# Patient Record
Sex: MALE | Race: WHITE | NOT HISPANIC OR LATINO | Employment: OTHER | URBAN - METROPOLITAN AREA
[De-identification: names, ages, dates, MRNs, and addresses within clinical notes are randomized per-mention and may not be internally consistent; named-entity substitution may affect disease eponyms.]

---

## 2017-02-22 ENCOUNTER — OFFICE VISIT (OUTPATIENT)
Dept: FAMILY MEDICINE | Facility: CLINIC | Age: 62
End: 2017-02-22
Payer: COMMERCIAL

## 2017-02-22 ENCOUNTER — TELEPHONE (OUTPATIENT)
Dept: FAMILY MEDICINE | Facility: CLINIC | Age: 62
End: 2017-02-22

## 2017-02-22 VITALS
WEIGHT: 158 LBS | BODY MASS INDEX: 25.39 KG/M2 | HEIGHT: 66 IN | SYSTOLIC BLOOD PRESSURE: 116 MMHG | HEART RATE: 84 BPM | TEMPERATURE: 98.1 F | DIASTOLIC BLOOD PRESSURE: 82 MMHG | OXYGEN SATURATION: 97 %

## 2017-02-22 DIAGNOSIS — I10 HYPERTENSION GOAL BP (BLOOD PRESSURE) < 130/80: ICD-10-CM

## 2017-02-22 DIAGNOSIS — E78.5 HYPERLIPIDEMIA LDL GOAL <70: ICD-10-CM

## 2017-02-22 DIAGNOSIS — R19.7 DIARRHEA, UNSPECIFIED TYPE: ICD-10-CM

## 2017-02-22 DIAGNOSIS — R50.9 FEVER, UNSPECIFIED FEVER CAUSE: ICD-10-CM

## 2017-02-22 DIAGNOSIS — J06.9 ACUTE URI: Primary | ICD-10-CM

## 2017-02-22 DIAGNOSIS — I25.10 CORONARY ARTERY DISEASE INVOLVING NATIVE CORONARY ARTERY OF NATIVE HEART WITHOUT ANGINA PECTORIS: ICD-10-CM

## 2017-02-22 LAB
FLUAV+FLUBV AG SPEC QL: ABNORMAL
FLUAV+FLUBV AG SPEC QL: POSITIVE
SPECIMEN SOURCE: ABNORMAL

## 2017-02-22 PROCEDURE — 99213 OFFICE O/P EST LOW 20 MIN: CPT | Performed by: FAMILY MEDICINE

## 2017-02-22 PROCEDURE — 87804 INFLUENZA ASSAY W/OPTIC: CPT | Performed by: FAMILY MEDICINE

## 2017-02-22 RX ORDER — AZITHROMYCIN 250 MG/1
TABLET, FILM COATED ORAL
Qty: 6 TABLET | Refills: 0 | Status: SHIPPED | OUTPATIENT
Start: 2017-02-22 | End: 2017-11-09

## 2017-02-22 NOTE — NURSING NOTE
"Chief Complaint   Patient presents with     Cough       Initial /82  Pulse 84  Temp 98.1  F (36.7  C) (Oral)  Ht 5' 6\" (1.676 m)  Wt 158 lb (71.7 kg)  SpO2 97%  BMI 25.5 kg/m2 Estimated body mass index is 25.5 kg/(m^2) as calculated from the following:    Height as of this encounter: 5' 6\" (1.676 m).    Weight as of this encounter: 158 lb (71.7 kg)..  BP completed using cuff size: padmini Iisdro MA  "

## 2017-02-22 NOTE — PROGRESS NOTES
SUBJECTIVE:                                                    Luis Miguel Barnett is a 61 year old male who presents to clinic today for the following health issues:    Luis Miguel presents to the clinic for a cough lasting 5 days (2/18/17).   He is positive for fever, chills, sweats, headache, sinus pressure, bilateral ear pain, rhinorrhea, congestion (runny), sore throat from coughing, stiff neck/back, cough with productive brown/yellow/green sputum, wheeze, decreased appetite, diarrhea (1.5 days ago), fatigue, achiness.   He is negative for conjunctivitis, nausea, vomiting, chest pain, swelling, dysuria, sickness, strep exposure.   He has tried nyquil and robitussin without relief.  Of note stress test was negative in 11/2016.    Acute Illness   Acute illness concerns: cough  Onset: 5 days    Fever: YES    Chills/Sweats: YES    Headache (location?): YES    Sinus Pressure:YES    Conjunctivitis:  no    Ear Pain: YES: bilateral    Rhinorrhea: YES    Congestion: YES    Sore Throat: YES- from coughing     Cough: YES-productive of green/yellow/brown sputum    Wheeze: YES    Decreased Appetite: YES    Nausea: no    Vomiting: no    Diarrhea:  YES    Dysuria/Freq.: no    Fatigue/Achiness: YES    Sick/Strep Exposure: no     Therapies Tried and outcome: nyquil and robitussin without relief       Problem list and histories reviewed & adjusted, as indicated.  Additional history: as documented    BP Readings from Last 3 Encounters:   02/22/17 116/82   11/17/16 112/84   06/13/16 106/66     Recent Labs   Lab Test  11/10/16   0924  10/30/14   0952  07/15/13   0920   CHOL  142  154  161   HDL  40  39*  39*   LDL  69  72  82   TRIG  167*  215*  198*   CHOLHDLRATIO   --   3.9  4.1     Wt Readings from Last 4 Encounters:   02/22/17 158 lb (71.7 kg)   11/17/16 159 lb (72.1 kg)   06/13/16 157 lb (71.2 kg)   04/08/16 156 lb (70.8 kg)       Health Maintenance    Health Maintenance Due   Topic Date Due     WELLNESS VISIT Q1 YR (NO INBASKET)   1955     HEPATITIS C SCREENING  08/07/1974     PSA Q1 YR  05/24/2012     MICROALBUMIN Q1 YEAR( NO INBASKET)  04/23/2016     INFLUENZA VACCINE (SYSTEM ASSIGNED)  09/01/2016       Current Problem List    Patient Active Problem List   Diagnosis     Presbyopia     Hyperlipidemia LDL goal <70     Hypertension goal BP (blood pressure) < 130/80     Advanced directives, counseling/discussion     Coronary artery disease involving native coronary artery without angina pectoris       Past Medical History    Past Medical History   Diagnosis Date     Antiplatelet or antithrombotic long-term use      Coronary artery disease 2007     multivessel stenting - JULIETA mid LAD x2 and JULIETA 1st diagonal, 2008 complete obstruction 1st diagonal with successful thrombectomy and JULIETA, 2013 in stent restenosis mid LAD (25%), prox to LAD stent had in segment restenosis of 50%, 50-70% in stent restenosis diagonal     Hypertension      Presbyopia      Pure hypercholesterolemia      Stented coronary artery      Unspecified cardiovascular disease 6/07, 11/08       Past Surgical History    Past Surgical History   Procedure Laterality Date     Surgical history of -   1994     s/p vasectomy      Surgical history of -   6/07, 11/08, 2/10     stent x 2 - LAD/Diagonal - Dr Gastelum - stent 11/08 - mid LAD     Surgical history of -   10/10     stress echo normal     Herniorrhaphy inguinal  11/16/2012     Procedure: HERNIORRHAPHY INGUINAL;  left inguinal hernia repair with Mesh ;  Surgeon: Cam Lancaster MD;  Location: [facility] OR     Heart cath, angioplasty       multivessel stenting - JULIETA mid LAD x2 and JULIETA 1st diagonal, 2008 complete obstruction 1st diagonal with successful thrombectomy and JULIETA, 2013 in stent restenosis mid LAD (25%), prox to LAD stent had in segment restenosis of 50%, 50-70% in stent restenosis diagonal       Current Medications    Current Outpatient Prescriptions   Medication Sig Dispense Refill     azithromycin (ZITHROMAX) 250 MG tablet 2  tabs day 1 and the 1 tab daily for 4 more days 6 tablet 0     nitroglycerin (NITROSTAT) 0.4 MG SL tablet Place 1 tablet (0.4 mg) under the tongue every 5 minutes as needed for chest pain 25 tablet 3     ezetimibe-simvastatin (VYTORIN) 10-40 MG per tablet Take 1 tablet by mouth At Bedtime 90 tablet 3     metoprolol (TOPROL-XL) 25 MG 24 hr tablet Take 1 tablet (25 mg) by mouth daily 90 tablet 4     lisinopril (PRINIVIL,ZESTRIL) 5 MG tablet Take 1 tablet (5 mg) by mouth daily 90 tablet 4     clopidogrel (PLAVIX) 75 MG tablet ONE DAILY 90 tablet 4     ascorbic acid (VITAMIN C) 500 MG tablet Take by mouth daily       Multiple Vitamin (MULTIVITAMINS PO) Take 1 tablet by mouth daily       ASPIRIN 81 MG OR TABS ONE DAILY 100 3       Allergies    No Known Allergies    Immunizations    Immunization History   Administered Date(s) Administered     TD (ADULT, 7+) 01/15/1998     TDAP (ADACEL AGES 11-64) 01/14/2008       Family History    Family History   Problem Relation Age of Onset     Alzheimer Disease Mother      HEART DISEASE Father      MI     C.A.D. Brother 48     Stents     Family History Negative Other        Social History    Social History     Social History     Marital status:      Spouse name: N/A     Number of children: N/A     Years of education: N/A     Occupational History     Not on file.     Social History Main Topics     Smoking status: Former Smoker     Packs/day: 1.00     Years: 30.00     Quit date: 4/1/2001     Smokeless tobacco: Never Used      Comment: quit 4/2001     Alcohol use No     Drug use: No     Sexual activity: Yes     Other Topics Concern     Parent/Sibling W/ Cabg, Mi Or Angioplasty Before 65f 55m? Yes     Caffeine Concern Yes     1 soda a day     Sleep Concern No     does not sleep well some nights     Stress Concern No     Special Diet No     Exercise Yes     Walking 2 days per week, working PT     Seat Belt Yes     Social History Narrative       All above reviewed and updated, all  "stable unless otherwise noted    Recent labs reviewed    ROS:  Constitutional, HEENT, cardiovascular, pulmonary, GI, , musculoskeletal, neuro, skin, endocrine and psych systems are negative, except as in HPI or otherwise noted       OBJECTIVE:                                                    /82  Pulse 84  Temp 98.1  F (36.7  C) (Oral)  Ht 5' 6\" (1.676 m)  Wt 158 lb (71.7 kg)  SpO2 97%  BMI 25.5 kg/m2  Body mass index is 25.5 kg/(m^2).  GENERAL: healthy, alert and no distress  EYES: Eyes grossly normal to inspection, extraocular movements - intact, and PERRL  HENT: ear canals- normal; TMs- normal; Nose- normal; Mouth- no ulcers, no lesions  NECK: no tenderness, no adenopathy, no asymmetry, no masses, no stiffness; thyroid- normal to palpation  RESP: lungs clear to auscultation - no rales, no rhonchi, no wheezes  CV: regular rates and rhythm, normal S1 S2, no S3 or S4 and no murmur, no click or rub -  ABDOMEN: soft, no tenderness, no  hepatosplenomegaly, no masses, normal bowel sounds  MS: extremities- no gross deformities noted, no edema  SKIN: no suspicious lesions, no rashes  NEURO: strength and tone- normal, sensory exam- grossly normal, mentation- intact, speech- normal, reflexes- symmetric  BACK: no CVA tenderness, no paralumbar tenderness  PSYCH: Alert and oriented times 3; speech- coherent , normal rate and volume; able to articulate logical thoughts, able to abstract reason, no tangential thoughts, no hallucinations or delusions, affect- normal    DIAGNOSTICS/PROCEDURES:                                                      none     ASSESSMENT/PLAN:                                                        ICD-10-CM    1. Acute URI J06.9 azithromycin (ZITHROMAX) 250 MG tablet   2. Fever, unspecified fever cause R50.9 Influenza A/B antigen     azithromycin (ZITHROMAX) 250 MG tablet   3. Diarrhea, unspecified type R19.7    4. Coronary artery disease involving native coronary artery of native heart " without angina pectoris I25.10    5. Hypertension goal BP (blood pressure) < 130/80 I10    6. Hyperlipidemia LDL goal <70 E78.5        Discussed treatment/modality options, including risk and benefits, he desires advised alcohol consumption 1oz per day or less, advised aspirin 81 mg po daily, advised 1 multivitamin per day, advised calcium 3694-8767 mg/d and Vitamin D 800-1200 IU/d, advised dentist every 6 months, advised diet and exercise, advised opthalmologist every 1-2 years, further health care maintenance, new medication(s), OTC meds and observation. All diagnosis above reviewed and noted above, otherwise stable.  See [facility] orders for further details.  Follow up as needed.    OTC meds advised, prescribed Zithromax, further HCM prn, sx cares.    Health Maintenance Due   Topic Date Due     WELLNESS VISIT Q1 YR (NO INBASKET)  1955     HEPATITIS C SCREENING  08/07/1974     PSA Q1 YR  05/24/2012     MICROALBUMIN Q1 YEAR( NO INBASKET)  04/23/2016     INFLUENZA VACCINE (SYSTEM ASSIGNED)  09/01/2016       See Patient Instructions  This document serves as a record of the services and decisions personally performed and made by Adonis Swenson MD [facility]. It was created on their behalf by Nicolás Zaidi, a trained medical scribe. The creation of this document is based the provider's statements to the medical scribe.  Nicolás Zaidi February 22, 2017 9:22 AM             Adonis Swenson MD [address]  [id number] [phone] Fax

## 2017-02-22 NOTE — PATIENT INSTRUCTIONS
2/22/17    Zithromax- 2 pills today 1 daily next 4 days    Use OTC Mucinex DM and warm showers to help with sinus and nasal congestion.    Tylenol and/or ibuprofen as needed     [facility] [place]                        To reach your care team during and after hours:   [phone]  To reach our pharmacy:        [phone]    Clinic Hours                        Our clinic hours are:    Monday   7:30 am to 7:00 pm                  Tuesday through Friday 7:30 am to 5:00 pm                             Saturday   8:00 am to 12:00 pm      Sunday   Closed      Pharmacy Hours                        Our pharmacy hours are:    Monday   8:30 am to 7:00 pm       Tuesday to Friday  8:30 am to 6:00 pm                       Saturday    9:00 am to 1:00 pm              Sunday    Closed              There is also information available at our web site:  [email or web address]    If your provider ordered any lab tests and you do not receive the results within 10 business days, please call the clinic.    If you need a medication refill please contact your pharmacy.  Please allow 2-3 business days for your refill to be completed.    Our clinic offers telephone visits and e visits.  Please ask one of your team members to explain more.      Use [employer] (secure email communication and access to your chart) to send your primary care provider a message or make an appointment. Ask someone on your Team how to sign up for [employer].  Immunizations                      Immunization History   Administered Date(s) Administered     TD (ADULT, 7+) 01/15/1998     TDAP (ADACEL AGES 11-64) 01/14/2008        Health Maintenance                         Health Maintenance Due   Topic Date Due     Wellness Visit with your Primary Provider - yearly  1955     Hepatitis C Screening  08/07/1974     Prostate Test (PSA) - yearly  05/24/2012     Microalbumin Lab - yearly  04/23/2016     Flu Vaccine - yearly  09/01/2016

## 2017-02-22 NOTE — MR AVS SNAPSHOT
After Visit Summary   2/22/2017    Luis Miguel Barnett    MRN: [id number]           Patient Information     Date Of Birth          1955        Visit Information        Provider Department      2/22/2017 9:00 AM Adonis Swenson MD [facility]        Today's Diagnoses     Acute URI    -  1    Fever, unspecified fever cause        Diarrhea, unspecified type        Coronary artery disease involving native coronary artery of native heart without angina pectoris        Hypertension goal BP (blood pressure) < 130/80        Hyperlipidemia LDL goal <70          Care Instructions    2/22/17    Zithromax- 2 pills today 1 daily next 4 days    Use OTC Mucinex DM and warm showers to help with sinus and nasal congestion.    Tylenol and/or ibuprofen as needed     [facility] [place]                        To reach your care team during and after hours:   [phone]  To reach our pharmacy:        [phone]    Clinic Hours                        Our clinic hours are:    Monday   7:30 am to 7:00 pm                  Tuesday through Friday 7:30 am to 5:00 pm                             Saturday   8:00 am to 12:00 pm      Sunday   Closed      Pharmacy Hours                        Our pharmacy hours are:    Monday   8:30 am to 7:00 pm       Tuesday to Friday  8:30 am to 6:00 pm                       Saturday    9:00 am to 1:00 pm              Sunday    Closed              There is also information available at our web site:  [email or web address]    If your provider ordered any lab tests and you do not receive the results within 10 business days, please call the clinic.    If you need a medication refill please contact your pharmacy.  Please allow 2-3 business days for your refill to be completed.    Our clinic offers telephone visits and e visits.  Please ask one of your team members to explain more.      Use [employer] (secure email communication and access to your chart) to send your primary care  "provider a message or make an appointment. Ask someone on your Team how to sign up for [employer].  Immunizations                      Immunization History   Administered Date(s) Administered     TD (ADULT, 7+) 01/15/1998     TDAP (ADACEL AGES 11-64) 01/14/2008        Health Maintenance                         Health Maintenance Due   Topic Date Due     Wellness Visit with your Primary Provider - yearly  1955     Hepatitis C Screening  08/07/1974     Prostate Test (PSA) - yearly  05/24/2012     Microalbumin Lab - yearly  04/23/2016     Flu Vaccine - yearly  09/01/2016             Follow-ups after your visit        Who to contact     If you have questions or need follow up information about today's clinic visit or your schedule please contact [facility] directly at [phone].  Normal or non-critical lab and imaging results will be communicated to you by MyChart, letter or phone within 4 business days after the clinic has received the results. If you do not hear from us within 7 days, please contact the clinic through MyChart or phone. If you have a critical or abnormal lab result, we will notify you by phone as soon as possible.  Submit refill requests through [employer] or call your pharmacy and they will forward the refill request to us. Please allow 3 business days for your refill to be completed.          Additional Information About Your Visit        [employer]Hubs Information     [employer] lets you send messages to your doctor, view your test results, renew your prescriptions, schedule appointments and more. To sign up, go to [email or web address] . Click on \"Log in\" on the left side of the screen, which will take you to the Welcome page. Then click on \"Sign up Now\" on the right side of the page.     You will be asked to enter the access code listed below, as well as some personal information. Please follow the directions to create your username and password.     Your access code is: " [email or web address]  Expires: 2017  9:43 AM     Your access code will  in 90 days. If you need help or a new code, please call your [facility] clinic or [phone].        Care EveryWhere ID     This is your Care EveryWhere ID. This could be used by other organizations to access your [facility] medical records  [id number]        Your Vitals Were     Pulse Temperature Height Pulse Oximetry BMI (Body Mass Index)       84 98.1  F (36.7  C) (Oral) 5' 6\" (1.676 m) 97% 25.5 kg/m2        Blood Pressure from Last 3 Encounters:   17 116/82   16 112/84   16 106/66    Weight from Last 3 Encounters:   17 158 lb (71.7 kg)   16 159 lb (72.1 kg)   16 157 lb (71.2 kg)              We Performed the Following     Influenza A/B antigen          Today's Medication Changes          These changes are accurate as of: 17  9:43 AM.  If you have any questions, ask your nurse or doctor.               Start taking these medicines.        Dose/Directions    azithromycin 250 MG tablet   Commonly known as:  ZITHROMAX   Used for:  Acute URI, Fever, unspecified fever cause   Started by:  Adonis Swenson MD        2 tabs day 1 and the 1 tab daily for 4 more days   Quantity:  6 tablet   Refills:  0            Where to get your medicines      These medications were sent to [address] [address]     Phone:  [phone]     azithromycin 250 MG tablet                Primary Care Provider Office Phone # Fax #    Tommie King [phone]       [address] [address]        Thank you!     Thank you for choosing [facility]  for your care. Our goal is always to provide you with excellent care. Hearing back from our patients is one way we can continue to improve our services. Please take a few minutes to complete the written survey that " you may receive in the mail after your visit with us. Thank you!             Your Updated Medication List - Protect others around you: Learn how to safely use, store and throw away your medicines at www.disposemymeds.org.          This list is accurate as of: 2/22/17  9:43 AM.  Always use your most recent med list.                   Brand Name Dispense Instructions for use    ascorbic acid 500 MG tablet    VITAMIN C     Take by mouth daily       aspirin 81 MG tablet     100    ONE DAILY       azithromycin 250 MG tablet    ZITHROMAX    6 tablet    2 tabs day 1 and the 1 tab daily for 4 more days       clopidogrel 75 MG tablet    PLAVIX    90 tablet    ONE DAILY       ezetimibe-simvastatin 10-40 MG per tablet    VYTORIN    90 tablet    Take 1 tablet by mouth At Bedtime       lisinopril 5 MG tablet    PRINIVIL/ZESTRIL    90 tablet    Take 1 tablet (5 mg) by mouth daily       metoprolol 25 MG 24 hr tablet    TOPROL-XL    90 tablet    Take 1 tablet (25 mg) by mouth daily       MULTIVITAMINS PO      Take 1 tablet by mouth daily       nitroglycerin 0.4 MG sublingual tablet    NITROSTAT    25 tablet    Place 1 tablet (0.4 mg) under the tongue every 5 minutes as needed for chest pain

## 2017-02-22 NOTE — TELEPHONE ENCOUNTER
Message handled by Nurse Triage with [employer] - provider name: OMER DEL ROSARIO     .  Called pt advised he has influenza A.  Advised pper OMER DEL ROSARIO that if his wife wanted Tamiflu she could get preventative.    Mindy Grace RN    [facility]

## 2017-03-24 DIAGNOSIS — H92.01 OTALGIA, RIGHT: ICD-10-CM

## 2017-03-24 DIAGNOSIS — H65.90 OME (OTITIS MEDIA WITH EFFUSION), UNSPECIFIED LATERALITY: Primary | ICD-10-CM

## 2017-03-24 RX ORDER — NEOMYCIN SULFATE, POLYMYXIN B SULFATE AND HYDROCORTISONE 10; 3.5; 1 MG/ML; MG/ML; [USP'U]/ML
SUSPENSION/ DROPS AURICULAR (OTIC)
Qty: 10 ML | Refills: 0 | Status: SHIPPED | OUTPATIENT
Start: 2017-03-24 | End: 2018-03-30

## 2017-03-24 NOTE — TELEPHONE ENCOUNTER
This is not historical.  Just was not on current med list.  Routing refill request to provider for review/approval because:  Drug not active on patient's medication list    Routing to PCP for further review/recommendations/orders.    Summer Padilla RN

## 2017-03-24 NOTE — TELEPHONE ENCOUNTER
SVETA/POLY/HC OTIC SUSPENSION    - historical  Last Written Prescription Date: 06/29/2015  Last Fill Quantity: 10mL,  # refills: 0   Last Office Visit with [facility], [facility] or [facility] prescribing provider: 02/22/2017

## 2017-03-31 ENCOUNTER — TELEPHONE (OUTPATIENT)
Dept: FAMILY MEDICINE | Facility: CLINIC | Age: 62
End: 2017-03-31

## 2017-03-31 ENCOUNTER — VIRTUAL VISIT (OUTPATIENT)
Dept: FAMILY MEDICINE | Facility: OTHER | Age: 62
End: 2017-03-31

## 2017-03-31 NOTE — TELEPHONE ENCOUNTER
Acute Illness   Acute illness concerns: cough and congestion   Onset:2/28/2017    Fever: no     Chills/Sweats: YES- chilled     Headache (location?): no     Sinus Pressure:YES- post-nasal drainage    Conjunctivitis:  no    Ear Pain: no    Rhinorrhea: YES- clear     Congestion: YES    Sore Throat: YES     Cough: YES-non-productive    Wheeze: no     Decreased Appetite: YES    Nausea: no     Vomiting: no     Diarrhea:  no     Dysuria/Freq.: no     Fatigue/Achiness: YES- body aches     Sick/Strep Exposure: no     Therapies Tried and outcome: tylenol       Advised pt he needs to try tylenol mortrin and sudafed, push fluids humidify rooms while sleeping and if not better in 7 days then call the clinic back     Patient stated an understanding and agreed with plan.    Pippa Sethi RN, BSN  [facility]

## 2017-03-31 NOTE — TELEPHONE ENCOUNTER
Reason for Call:  Patient had influenza a.  Was given zithromax.  States he is leaving for [place] tomorrow.  States he feels like he is getting sick again and thinks he would like to get amoxicillin.  Please advise    Best phone number to reach pt at is: [phone]  Ok to leave a message with medical info? yes    Pharmacy preferred (if calling for a refill): [facility]

## 2017-04-01 NOTE — PROGRESS NOTES
"Date:   Clinician: Thomas Peralta  Clinician NPI: [id number]  Patient: Luis Miguel Barnett  Patient : 1955  Patient Address: [address] [place] [address], [place] [address]  Patient Phone: [phone]  Visit Protocol: URI  Patient Summary:  Luis Miguel is a 61 year old ( : 1955 ) male who initiated a Zip for significant cough. When asked the question \"Do you have a [place] primary care physician?\", Luis Miguel responded \"I don't know\".     His symptoms started gradually 3-6 days ago and consist of myalgias, malaise, loss of appetite, cough, itchy eyes, chills, dysphagia, rhinitis, post-nasal drainage, nasal congestion, sore throat, and hoarse voice.   He denies ear pain, chest pain, fever, nausea, vomiting, dyspnea, and petechial or purpuric rash. He denies a history of facial surgery.   His moderate nasal secretions are yellow. His moderate facial pain or pressure feels worse when bending over or leaning forward and is located on both sides of his head. The facial pain or pressure started after the onset of other URI symptoms.  Luis Miguel has a mild headache. The headache did not start before his other symptoms and is located on both sides of his head.   He has a moderately painful sore throat. When Luis Miguel swallows liquids or saliva, he experiences moderate pain. The patient denies having white spots on the tonsils similar to a sample strep throat image provided. He might have been exposed to Strep. When asked to feel his neck he could not tell if lymph nodes were enlarged. He denies axillary lymphadenopathy.   His moderately severe (cough every 5-10 minutes) productive cough is more bothersome at night. He believes the cough is caused by post-nasal drainage. His cough produces yellow sputum. He experiences moderate wheezing on a regular basis.   He denies rigors.   Luis Miguel denies having COPD or other chronic lung disease.   Pulse: Not measured beats in 10 seconds.    Weight (in lbs): 160   Luis Miguel does not " smoke or use smokeless tobacco.    MEDICATIONS:  No current medications , ALLERGIES:  NKDA   Clinician Response:  Dear Luis Miguel,  Based on the information you have provided, you likely have a viral upper respiratory infection, otherwise known as a 'cold'.   I am prescribing benzonatate (Tessalon Perles) 100 mg to treat your cough. Take one to two tablets by mouth three times a day as needed for cough. There are no refills with this prescription.   Unless your are allergic to the over-the-counter medication(s) below, I recommend using:   Guaifenesin + dextromethorphan (Robitussin DM, Mucinex DM). This is an over-the-counter medication that does not require a prescription.   A decongestant such as pseudoephedrine (Sudafed or store brand) to help your symptoms. This is an over-the-counter medication that does not require a prescription.   Ibuprofen. Take 1-3 200mg tablets (200-600mg) every 8 hours to help with the discomfort. Make sure to take the ibuprofen with food. Do not exceed 2400mg in 24 hours. This is an over-the-counter medication that does not require a prescription.   Try the following to help with your throat pain and discomfort:     Use throat lozenges    Gargle with warm salt water (1/4 teaspoon of salt per 8 ounce glass of water).    Suck on frozen items such as Popsicles or ice cubes.     Call 911 or go to the emergency room if you feel that your throat is closing off, you suddenly develop a rash, you are unable to swallow fluids, you are drooling, or you are having difficulty breathing.  Follow up with your primary care clinician if your symptoms are not improving in 3-4 days.   Because your condition is most likely caused by a virus, antibiotics will not help you get better. Inappropriately treating a viral infection with antibiotics may cause harmful side-effects. In fact, antibiotics may make you feel worse.  For more information on why I am not prescribing antibiotics, please watch this video:  Antibiotics Won't Help You.   Drink plenty of liquids, especially water and take time to rest your body. This may mean taking a nap or going to bed earlier. Your body is fighting an infection and liquids and rest will improve the pace of recovery. Remember to regularly wash your hands and avoid close contact with others to prevent spreading your infection.   Diagnosis: Viral URI  Diagnosis ICD: J06.9  Prescription: benzonatate (Tessalon Perles) 100mg oral tablet 30 tablets, 5 days supply. Take one to two tablets by mouth three times a day as needed. disp. 30. Refills: 0, Refill as needed: no, Allow substitutions: yes

## 2017-06-16 ENCOUNTER — TELEPHONE (OUTPATIENT)
Dept: CARDIOLOGY | Facility: CLINIC | Age: 62
End: 2017-06-16

## 2017-06-16 DIAGNOSIS — I25.10 CAD (CORONARY ARTERY DISEASE): Primary | ICD-10-CM

## 2017-06-16 NOTE — TELEPHONE ENCOUNTER
Pt called to report that he has been experiencing intermittent episodes of a discomfort in his chest that he describes as hollow and achy feeling in the center of his chest, for past 2-3 days. The episodes occur both with exertion and activity. He did try taking an SL nitro with an episode yesterday, but had a hard time telling whether it helped because the nitro makes him feel poorly. He said he also been yawning a lot more frequently and having to take deeper breaths, but doesn't describe any prolonged episodes of shortness of breath. Pt typically gets an annual stress echo and will be due 11/2017. He has hx of CAD and has had multiple revascularizations of his LAD at the bifurcation. Pt aware to go to ER if he develops any prolonged or worsening episodes. I tentatively scheduled pt for stress echo next Friday (pt wants Thu or Friday d/t work obligations). Will send an update to  and verify whether she wants pt to have stress echo at this time. Scheduled pt to see  6/29 @3093. Peri FERNANDEZ

## 2017-06-21 NOTE — TELEPHONE ENCOUNTER
I called pt to find out how he has been feeling the past several days. He said he has been feeling fine since I last spoke to him. I reviewed with  and she said pt should have stress echo prior to 6/30 OV as it has been very good for detection of restenosis for him. I called and reviewed with pt. He would like to have stress echo same day as OV with  as he already has to take off work that day. I transferred him to scheduling to set up. Peri FERNANDEZ

## 2017-06-30 ENCOUNTER — HOSPITAL ENCOUNTER (OUTPATIENT)
Dept: CARDIOLOGY | Facility: CLINIC | Age: 62
Discharge: HOME | End: 2017-06-30
Attending: INTERNAL MEDICINE | Admitting: INTERNAL MEDICINE
Payer: COMMERCIAL

## 2017-06-30 ENCOUNTER — TELEPHONE (OUTPATIENT)
Dept: CARDIOLOGY | Facility: CLINIC | Age: 62
End: 2017-06-30

## 2017-06-30 DIAGNOSIS — I25.10 CAD (CORONARY ARTERY DISEASE): ICD-10-CM

## 2017-06-30 DIAGNOSIS — I25.10 CAD (CORONARY ARTERY DISEASE): Primary | ICD-10-CM

## 2017-06-30 PROCEDURE — 25500064 ZZH RX 255 OP 636: Performed by: INTERNAL MEDICINE

## 2017-06-30 PROCEDURE — 93325 DOPPLER ECHO COLOR FLOW MAPG: CPT | Mod: 26 | Performed by: INTERNAL MEDICINE

## 2017-06-30 PROCEDURE — 93016 CV STRESS TEST SUPVJ ONLY: CPT | Performed by: INTERNAL MEDICINE

## 2017-06-30 PROCEDURE — 93018 CV STRESS TEST I&R ONLY: CPT | Performed by: INTERNAL MEDICINE

## 2017-06-30 PROCEDURE — 93350 STRESS TTE ONLY: CPT | Mod: 26 | Performed by: INTERNAL MEDICINE

## 2017-06-30 PROCEDURE — 93321 DOPPLER ECHO F-UP/LMTD STD: CPT | Mod: 26 | Performed by: INTERNAL MEDICINE

## 2017-06-30 PROCEDURE — 40000264 ECHO STRESS TEST WITH LUMASON

## 2017-06-30 RX ADMIN — SULFUR HEXAFLUORIDE 5 ML: KIT at 09:30

## 2017-06-30 NOTE — TELEPHONE ENCOUNTER
Stress echo results from 6/30 noted, and no evidence of ischemia. Pt has OV with  this afternoon. I called him with results and pt said he has been feeling fine since we spoke earlier this month regarding an episode of chest pain he had recently. Pt does not want to come to [place] if not needed. I told him since he is not having any symptoms and stress echo was normal I will cancel his OV with  today, and have him f/u in 11/2017 for his annual with . Pt will call sooner if he has any further episodes of chest pain. Peri FERNANDEZ

## 2017-11-08 ENCOUNTER — PRE VISIT (OUTPATIENT)
Dept: CARDIOLOGY | Facility: CLINIC | Age: 62
End: 2017-11-08

## 2017-11-09 ENCOUNTER — OFFICE VISIT (OUTPATIENT)
Dept: CARDIOLOGY | Facility: CLINIC | Age: 62
End: 2017-11-09
Attending: INTERNAL MEDICINE
Payer: COMMERCIAL

## 2017-11-09 VITALS
SYSTOLIC BLOOD PRESSURE: 112 MMHG | DIASTOLIC BLOOD PRESSURE: 66 MMHG | HEIGHT: 66 IN | BODY MASS INDEX: 25.46 KG/M2 | HEART RATE: 78 BPM | WEIGHT: 158.4 LBS

## 2017-11-09 DIAGNOSIS — I10 HYPERTENSION GOAL BP (BLOOD PRESSURE) < 130/80: ICD-10-CM

## 2017-11-09 DIAGNOSIS — I25.10 CORONARY ARTERY DISEASE INVOLVING NATIVE CORONARY ARTERY OF NATIVE HEART WITHOUT ANGINA PECTORIS: Primary | ICD-10-CM

## 2017-11-09 DIAGNOSIS — E78.00 PURE HYPERCHOLESTEROLEMIA: ICD-10-CM

## 2017-11-09 PROCEDURE — 99213 OFFICE O/P EST LOW 20 MIN: CPT | Performed by: INTERNAL MEDICINE

## 2017-11-09 NOTE — LETTER
11/9/2017    Tommie King MD  [id number] [facility] [address]      RE: Luis Miguel Barnett       Dear Colleague,    I had the pleasure of seeing Luis Miguel Barnett in the [facility].    REFERRING PHYSICIAN:  Dr. Tommie King.      HISTORY OF PRESENT ILLNESS:  Mr. Barnett is a pleasant 62-year-old male with a history of coronary artery disease.  He has had multiple revascularizations of his left anterior descending artery at the bifurcation with the diagonal.  He did come in with some chest pain symptoms in June of this year and underwent an early stress echocardiogram for assessment of his symptoms.  He did not have any reproducible symptoms on the treadmill, and his echocardiogram appeared normal without evidence of ischemia.  He was able to walk 11 minutes on a standard Vipin protocol, which was similar to his previous MET level from the year before.  Mr. Barnett says that his symptoms had resolved after the stress testing and he has not had any recurrence of symptoms of chest discomfort.  He is staying active.  He denies any difficulty breathing or palpitations.      PHYSICAL EXAMINATION:   VITAL SIGNS:  On exam today, his blood pressure is 112/66, pulse is 78, weight 158 with a body mass index of 25.   NECK:  Carotid upstrokes are brisk without bruit.   CARDIOVASCULAR:  Tones are regular.  I do not appreciate a murmur, gallop or rub.   LUNGS:  Clear posteriorly.   EXTREMITIES:  He has strong and symmetric pulses in the upper and lower extremities without peripheral edema.      ASSESSMENT AND PLAN:  In summary, Mr. Barnett is a pleasant 62-year-old male with a history of coronary artery disease.  He has had a complicated revascularization of his left anterior descending artery at the bifurcation of the first diagonal and had multiple revascularizations in this area.  He did have some atypical chest pain symptoms here at the beginning of this summer in June.  He underwent an early  stress echocardiogram for evaluation of his symptoms.  This came back normal, and he was able to perform a similar level of exercise as the year before without difficulty or symptoms on the treadmill.  His symptoms resolved after that on their own and have not reoccurred.  At this time, he is stable from a cardiac perspective and asymptomatic.  I will recommend to continue his current medical management.  I will be happy to continue to follow him on an annual basis or as needed.  Please feel free to contact me with any questions you have in regards to his care.          Outpatient Encounter Prescriptions as of 11/9/2017   Medication Sig Dispense Refill     nitroglycerin (NITROSTAT) 0.4 MG SL tablet Place 1 tablet (0.4 mg) under the tongue every 5 minutes as needed for chest pain 25 tablet 3     [DISCONTINUED] ezetimibe-simvastatin (VYTORIN) 10-40 MG per tablet Take 1 tablet by mouth At Bedtime 90 tablet 3     [DISCONTINUED] metoprolol (TOPROL-XL) 25 MG 24 hr tablet Take 1 tablet (25 mg) by mouth daily 90 tablet 4     [DISCONTINUED] lisinopril (PRINIVIL,ZESTRIL) 5 MG tablet Take 1 tablet (5 mg) by mouth daily 90 tablet 4     [DISCONTINUED] clopidogrel (PLAVIX) 75 MG tablet ONE DAILY 90 tablet 4     ascorbic acid (VITAMIN C) 500 MG tablet Take by mouth daily       Multiple Vitamin (MULTIVITAMINS PO) Take 1 tablet by mouth daily       ASPIRIN 81 MG OR TABS ONE DAILY 100 3     neomycin-polymyxin-hydrocortisone (CORTISPORIN) 3.5-05493-1 otic suspension PLACE 4 DROPS INTO THE RIGHT EAR FOUR TIMES A DAY (Patient not taking: Reported on 11/9/2017) 10 mL 0     [DISCONTINUED] azithromycin (ZITHROMAX) 250 MG tablet 2 tabs day 1 and the 1 tab daily for 4 more days 6 tablet 0     No facility-administered encounter medications on file as of 11/9/2017.        Again, thank you for allowing me to participate in the care of your patient.      Sincerely,    Saniya Bustillo, DO     [facility]

## 2017-11-09 NOTE — MR AVS SNAPSHOT
"              After Visit Summary   11/9/2017    Luis Miguel Barnett    MRN: [id number]           Patient Information     Date Of Birth          1955        Visit Information        Provider Department      11/9/2017 2:45 PM Saniya Bustillo DO [facility]        Today's Diagnoses     Coronary artery disease involving native coronary artery of native heart without angina pectoris    -  1    Hypertension goal BP (blood pressure) < 130/80        Pure hypercholesterolemia           Follow-ups after your visit        Additional Services     Follow-Up with Cardiologist                 Future tests that were ordered for you today     Open Future Orders        Priority Expected Expires Ordered    Exercise Stress Echocardiogram Routine 11/9/2018 11/10/2018 11/9/2017    Follow-Up with Cardiologist Routine 11/9/2018 11/10/2018 11/9/2017            Who to contact     If you have questions or need follow up information about today's clinic visit or your schedule please contact [facility] directly at [phone].  Normal or non-critical lab and imaging results will be communicated to you by [employer], letter or phone within 4 business days after the clinic has received the results. If you do not hear from us within 7 days, please contact the clinic through [email or web address] or phone. If you have a critical or abnormal lab result, we will notify you by phone as soon as possible.  Submit refill requests through [employer] or call your pharmacy and they will forward the refill request to us. Please allow 3 business days for your refill to be completed.          Additional Information About Your Visit        MyChart Information     [employer] lets you send messages to your doctor, view your test results, renew your prescriptions, schedule appointments and more. To sign up, go to [email or web address] . Click on \"Log in\" on the left side of the screen, " "which will take you to the Welcome page. Then click on \"Sign up Now\" on the right side of the page.     You will be asked to enter the access code listed below, as well as some personal information. Please follow the directions to create your username and password.     Your access code is: [email or web address]  Expires: 2018  3:10 PM     Your access code will  in 90 days. If you need help or a new code, please call your [facility] clinic or [phone].        Care EveryWhere ID     This is your Care EveryWhere ID. This could be used by other organizations to access your [facility] medical records  [id number]        Your Vitals Were     Pulse Height BMI (Body Mass Index)             78 1.676 m (5' 6\") 25.57 kg/m2          Blood Pressure from Last 3 Encounters:   17 112/66   17 116/82   16 112/84    Weight from Last 3 Encounters:   17 71.8 kg (158 lb 6.4 oz)   17 71.7 kg (158 lb)   16 72.1 kg (159 lb)              We Performed the Following     Follow-Up with Cardiologist        Primary Care Provider Office Phone # Fax #    Tommie King [phone]       [address] [place] [address]        Equal Access to Services     [facility] : Hadii eitan ku hadasho Sonasim, waaxda luqadaha, qaybta kaalmada kingayacaitlin, nina paz . So [facility] [phone].    ATENCIÓN: Si habla español, tiene a luo disposición servicios gratuitos de asistencia lingüística. Sindy al [phone].    We comply with applicable federal civil rights laws and [place] laws. We do not discriminate on the basis of race, color, national origin, age, disability, sex, sexual orientation, or gender identity.            Thank you!     Thank you for choosing [facility]  for your care. Our goal is always to provide you with excellent care. Hearing back from our patients is one way we can continue to improve our services. " Please take a few minutes to complete the written survey that you may receive in the mail after your visit with us. Thank you!             Your Updated Medication List - Protect others around you: Learn how to safely use, store and throw away your medicines at www.disposemymeds.org.          This list is accurate as of: 11/9/17  3:10 PM.  Always use your most recent med list.                   Brand Name Dispense Instructions for use Diagnosis    ascorbic acid 500 MG tablet    VITAMIN C     Take by mouth daily        aspirin 81 MG tablet     100    ONE DAILY    Unspecified cardiovascular disease, Pure hypercholesterolemia       clopidogrel 75 MG tablet    PLAVIX    90 tablet    ONE DAILY    Coronary artery disease involving native coronary artery of native heart without angina pectoris       ezetimibe-simvastatin 10-40 MG per tablet    VYTORIN    90 tablet    Take 1 tablet by mouth At Bedtime    Pure hypercholesterolemia       lisinopril 5 MG tablet    PRINIVIL/ZESTRIL    90 tablet    Take 1 tablet (5 mg) by mouth daily    Benign essential hypertension       metoprolol 25 MG 24 hr tablet    TOPROL-XL    90 tablet    Take 1 tablet (25 mg) by mouth daily    Benign essential hypertension, Coronary artery disease involving native coronary artery of native heart without angina pectoris       MULTIVITAMINS PO      Take 1 tablet by mouth daily        neomycin-polymyxin-hydrocortisone 3.5-94350-6 otic suspension    CORTISPORIN    10 mL    PLACE 4 DROPS INTO THE RIGHT EAR FOUR TIMES A DAY    Otalgia, right, OME (otitis media with effusion), unspecified laterality       nitroGLYcerin 0.4 MG sublingual tablet    NITROSTAT    25 tablet    Place 1 tablet (0.4 mg) under the tongue every 5 minutes as needed for chest pain    Coronary artery disease involving native coronary artery of native heart without angina pectoris

## 2017-11-09 NOTE — PROGRESS NOTES
HPI and Plan:   See dictation    Orders Placed This Encounter   Procedures     Follow-Up with Cardiologist     Exercise Stress Echocardiogram       No orders of the defined types were placed in this encounter.      Medications Discontinued During This Encounter   Medication Reason     azithromycin (ZITHROMAX) 250 MG tablet Therapy completed         Encounter Diagnoses   Name Primary?     Coronary artery disease involving native coronary artery of native heart without angina pectoris Yes     Hypertension goal BP (blood pressure) < 130/80      Pure hypercholesterolemia        CURRENT MEDICATIONS:  Current Outpatient Prescriptions   Medication Sig Dispense Refill     nitroglycerin (NITROSTAT) 0.4 MG SL tablet Place 1 tablet (0.4 mg) under the tongue every 5 minutes as needed for chest pain 25 tablet 3     ezetimibe-simvastatin (VYTORIN) 10-40 MG per tablet Take 1 tablet by mouth At Bedtime 90 tablet 3     metoprolol (TOPROL-XL) 25 MG 24 hr tablet Take 1 tablet (25 mg) by mouth daily 90 tablet 4     lisinopril (PRINIVIL,ZESTRIL) 5 MG tablet Take 1 tablet (5 mg) by mouth daily 90 tablet 4     clopidogrel (PLAVIX) 75 MG tablet ONE DAILY 90 tablet 4     ascorbic acid (VITAMIN C) 500 MG tablet Take by mouth daily       Multiple Vitamin (MULTIVITAMINS PO) Take 1 tablet by mouth daily       ASPIRIN 81 MG OR TABS ONE DAILY 100 3     neomycin-polymyxin-hydrocortisone (CORTISPORIN) 3.5-91470-6 otic suspension PLACE 4 DROPS INTO THE RIGHT EAR FOUR TIMES A DAY (Patient not taking: Reported on 11/9/2017) 10 mL 0       ALLERGIES   No Known Allergies    PAST MEDICAL HISTORY:  Past Medical History:   Diagnosis Date     Antiplatelet or antithrombotic long-term use      Coronary artery disease 2007    multivessel stenting - JULIETA mid LAD x2 and JULIETA 1st diagonal, 2008 complete obstruction 1st diagonal with successful thrombectomy and JULIETA, 2013 in stent restenosis mid LAD (25%), prox to LAD stent had in segment restenosis of 50%, 50-70% in  stent restenosis diagonal     Hypertension      Presbyopia      Pure hypercholesterolemia      Stented coronary artery      Unspecified cardiovascular disease 6/07, 11/08       PAST SURGICAL HISTORY:  Past Surgical History:   Procedure Laterality Date     HEART CATH, ANGIOPLASTY      multivessel stenting - JULIETA mid LAD x2 and JULIETA 1st diagonal, 2008 complete obstruction 1st diagonal with successful thrombectomy and JULIETA, 2013 in stent restenosis mid LAD (25%), prox to LAD stent had in segment restenosis of 50%, 50-70% in stent restenosis diagonal     HERNIORRHAPHY INGUINAL  11/16/2012    Procedure: HERNIORRHAPHY INGUINAL;  left inguinal hernia repair with Mesh ;  Surgeon: Cam Lancaster MD;  Location: [facility] OR     SURGICAL HISTORY OF -   1994    s/p vasectomy      SURGICAL HISTORY OF -   6/07, 11/08, 2/10    stent x 2 - LAD/Diagonal - Dr Gastelum - stent 11/08 - mid LAD     SURGICAL HISTORY OF -   10/10    stress echo normal       FAMILY HISTORY:  Family History   Problem Relation Age of Onset     Alzheimer Disease Mother      HEART DISEASE Father      MI     C.A.D. Brother 48     Stents     Family History Negative Other        SOCIAL HISTORY:  Social History     Social History     Marital status:      Spouse name: N/A     Number of children: N/A     Years of education: N/A     Social History Main Topics     Smoking status: Former Smoker     Packs/day: 1.00     Years: 30.00     Quit date: 4/1/2001     Smokeless tobacco: Never Used      Comment: quit 4/2001     Alcohol use No     Drug use: No     Sexual activity: Yes     Other Topics Concern     Parent/Sibling W/ Cabg, Mi Or Angioplasty Before 65f 55m? Yes     Caffeine Concern Yes     1 soda a day     Sleep Concern No     does not sleep well some nights     Stress Concern No     Special Diet No     Exercise Yes     Walking 2 days per week, working PT     Seat Belt Yes     Social History Narrative       Review of Systems:  Skin:  Negative       Eyes:  Positive for  "glasses    ENT:  Negative      Respiratory:  Negative       Cardiovascular:  Negative      Gastroenterology: Negative      Genitourinary:  Negative      Musculoskeletal:  Positive for back pain always has a sore back - per pt  Neurologic:  Negative      Psychiatric:  Positive for sleep disturbances some nights   Heme/Lymph/Imm:  Negative      Endocrine:  Negative        Physical Exam:  Vitals: /66 (BP Location: Right arm, Patient Position: Sitting, Cuff Size: Adult Regular)  Pulse 78  Ht 1.676 m (5' 6\")  Wt 71.8 kg (158 lb 6.4 oz)  BMI 25.57 kg/m2    Constitutional:  cooperative, alert and oriented, well developed, well nourished, in no acute distress        Skin:  warm and dry to the touch          Head:  normocephalic, no masses or lesions        Eyes:  pupils equal and round        Lymph:      ENT:  no pallor or cyanosis, dentition good        Neck:  carotid pulses are full and equal bilaterally        Respiratory:  clear to auscultation;normal symmetry         Cardiac: regular rhythm;no murmurs, gallops or rubs detected                pulses full and equal, no bruits auscultated                                        GI:  abdomen soft;no bruits        Extremities and Muscular Skeletal:  no deformities, clubbing, cyanosis, erythema observed              Neurological:  no gross motor deficits        Psych:  Alert and Oriented x 3          CC  Saniya Bustillo, DO  [address]  [place] [address]                  "

## 2017-11-10 NOTE — PROGRESS NOTES
REFERRING PHYSICIAN:  Dr. Tommie King.      HISTORY OF PRESENT ILLNESS:  Mr. Barnett is a pleasant 62-year-old male with a history of coronary artery disease.  He has had multiple revascularizations of his left anterior descending artery at the bifurcation with the diagonal.  He did come in with some chest pain symptoms in June of this year and underwent an early stress echocardiogram for assessment of his symptoms.  He did not have any reproducible symptoms on the treadmill, and his echocardiogram appeared normal without evidence of ischemia.  He was able to walk 11 minutes on a standard Vipin protocol, which was similar to his previous MET level from the year before.  Mr. Barnett says that his symptoms had resolved after the stress testing and he has not had any recurrence of symptoms of chest discomfort.  He is staying active.  He denies any difficulty breathing or palpitations.      PHYSICAL EXAMINATION:   VITAL SIGNS:  On exam today, his blood pressure is 112/66, pulse is 78, weight 158 with a body mass index of 25.   NECK:  Carotid upstrokes are brisk without bruit.   CARDIOVASCULAR:  Tones are regular.  I do not appreciate a murmur, gallop or rub.   LUNGS:  Clear posteriorly.   EXTREMITIES:  He has strong and symmetric pulses in the upper and lower extremities without peripheral edema.      ASSESSMENT AND PLAN:  In summary, Mr. Barnett is a pleasant 62-year-old male with a history of coronary artery disease.  He has had a complicated revascularization of his left anterior descending artery at the bifurcation of the first diagonal and had multiple revascularizations in this area.  He did have some atypical chest pain symptoms here at the beginning of this summer in June.  He underwent an early stress echocardiogram for evaluation of his symptoms.  This came back normal, and he was able to perform a similar level of exercise as the year before without difficulty or symptoms on the treadmill.  His symptoms resolved  after that on their own and have not reoccurred.  At this time, he is stable from a cardiac perspective and asymptomatic.  I will recommend to continue his current medical management.  I will be happy to continue to follow him on an annual basis or as needed.  Please feel free to contact me with any questions you have in regards to his care.      cc:   [place] [address]         JS RODRIGUEZ DO             D: 2017 15:08   T: 2017 23:19   MT: GWEN      Name:     STEPHANE ROGERS   MRN:      [id number]        Account:      [id number]   :      1955           Service Date: 2017      Document: [id number]

## 2017-12-05 DIAGNOSIS — I10 BENIGN ESSENTIAL HYPERTENSION: ICD-10-CM

## 2017-12-05 DIAGNOSIS — I25.10 CORONARY ARTERY DISEASE INVOLVING NATIVE CORONARY ARTERY OF NATIVE HEART WITHOUT ANGINA PECTORIS: ICD-10-CM

## 2017-12-05 DIAGNOSIS — E78.00 PURE HYPERCHOLESTEROLEMIA: ICD-10-CM

## 2017-12-05 RX ORDER — METOPROLOL SUCCINATE 25 MG/1
25 TABLET, EXTENDED RELEASE ORAL DAILY
Qty: 90 TABLET | Refills: 3 | Status: SHIPPED | OUTPATIENT
Start: 2017-12-05 | End: 2017-12-06

## 2017-12-05 RX ORDER — EZETIMIBE AND SIMVASTATIN 10; 40 MG/1; MG/1
1 TABLET ORAL AT BEDTIME
Qty: 90 TABLET | Refills: 3 | Status: SHIPPED | OUTPATIENT
Start: 2017-12-05 | End: 2017-12-06

## 2017-12-05 RX ORDER — CLOPIDOGREL BISULFATE 75 MG/1
TABLET ORAL
Qty: 90 TABLET | Refills: 3 | Status: SHIPPED | OUTPATIENT
Start: 2017-12-05 | End: 2017-12-06

## 2017-12-05 RX ORDER — LISINOPRIL 5 MG/1
5 TABLET ORAL DAILY
Qty: 90 TABLET | Refills: 3 | Status: SHIPPED | OUTPATIENT
Start: 2017-12-05 | End: 2017-12-06

## 2017-12-06 RX ORDER — LISINOPRIL 5 MG/1
5 TABLET ORAL DAILY
Qty: 90 TABLET | Refills: 3 | Status: SHIPPED | OUTPATIENT
Start: 2017-12-06 | End: 2018-11-26

## 2017-12-06 RX ORDER — EZETIMIBE AND SIMVASTATIN 10; 40 MG/1; MG/1
1 TABLET ORAL AT BEDTIME
Qty: 90 TABLET | Refills: 3 | Status: SHIPPED | OUTPATIENT
Start: 2017-12-06 | End: 2018-11-26

## 2017-12-06 RX ORDER — CLOPIDOGREL BISULFATE 75 MG/1
TABLET ORAL
Qty: 90 TABLET | Refills: 3 | Status: SHIPPED | OUTPATIENT
Start: 2017-12-06 | End: 2018-11-26

## 2017-12-06 RX ORDER — METOPROLOL SUCCINATE 25 MG/1
25 TABLET, EXTENDED RELEASE ORAL DAILY
Qty: 90 TABLET | Refills: 3 | Status: SHIPPED | OUTPATIENT
Start: 2017-12-06 | End: 2018-11-26

## 2017-12-07 ENCOUNTER — TELEPHONE (OUTPATIENT)
Dept: FAMILY MEDICINE | Facility: CLINIC | Age: 62
End: 2017-12-07

## 2017-12-07 DIAGNOSIS — R13.10 DYSPHAGIA, UNSPECIFIED TYPE: Primary | ICD-10-CM

## 2017-12-07 DIAGNOSIS — Z12.11 SCREENING FOR COLON CANCER: ICD-10-CM

## 2017-12-07 NOTE — TELEPHONE ENCOUNTER
Patient is calling to get a referral for a screening colonoscopy and also a EGD. Patient has had his esophagus stretched in the past so this is easier for him to swallow food. He would like both of these done   At [facility]. Pending referrals  Please advise. Thanks    Sonja Pond  Referral Coordinator

## 2017-12-07 NOTE — TELEPHONE ENCOUNTER
Not due for colonoscopy until 9/2018 - I'd recommend an office visit to go over current situation - and if EGD is indicated.   Overdue for a complete physical and various labs etc. too

## 2017-12-08 NOTE — TELEPHONE ENCOUNTER
Patient notified by phone.  He said has been having difficulty swallowing food. He said this is not new and has had it before.   He said he had upper GI and procedure in the past that helped.  He said as long as he chews is food really well he is okay.  He will call back to schedule.    FYI to RL.    NICHOLE Mustafa, RN, N  [facility]) [phone]

## 2017-12-28 ENCOUNTER — TELEPHONE (OUTPATIENT)
Dept: CARDIOLOGY | Facility: CLINIC | Age: 62
End: 2017-12-28

## 2017-12-28 NOTE — TELEPHONE ENCOUNTER
Reviewed with Dr. Bustillo she OK'd patient to hold Plavix (and ASA if necessary) for upcoming procedures. Even if both EGD and skin bx done close together and he's off Plavix for extended period she said he wouldn't need to resume with a loading dose since he's been on it for a long time already. Called patient and reviewed this with him. He said he was told he could resume the Plavix the following morning after the biopsy. He had no other questions.

## 2017-12-28 NOTE — TELEPHONE ENCOUNTER
Patient called and left  stating he needs a skin bx of basal cells (chest area) done on 1/25/18 and is requesting a hold on Plavix and possible ASA as well. 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 Will review with Dr Bustillo to see if OK to hold Plavix and ASA and for how long. Looks like some discussion ongoing with PMD whether he may also need an EGD with dilatation so will review with Dr Bustillo to let her know possibly will need a hold for 2 procedures. ? Possibly loading dose when he resumes the Plavix if he ends up holding for both procedures.

## 2018-02-16 ENCOUNTER — TELEPHONE (OUTPATIENT)
Dept: CARDIOLOGY | Facility: CLINIC | Age: 63
End: 2018-02-16

## 2018-02-16 NOTE — TELEPHONE ENCOUNTER
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 Explained to pt that if the blood vessel looks like it is getting worse than he should go to the ophthalmologist, but at this time we would not tell him to hold his medication. Pt gave verbal understanding.

## 2018-03-30 ENCOUNTER — OFFICE VISIT (OUTPATIENT)
Dept: FAMILY MEDICINE | Facility: CLINIC | Age: 63
End: 2018-03-30
Payer: COMMERCIAL

## 2018-03-30 VITALS
WEIGHT: 154.2 LBS | TEMPERATURE: 98 F | OXYGEN SATURATION: 95 % | DIASTOLIC BLOOD PRESSURE: 72 MMHG | HEART RATE: 75 BPM | BODY MASS INDEX: 24.78 KG/M2 | HEIGHT: 66 IN | SYSTOLIC BLOOD PRESSURE: 102 MMHG

## 2018-03-30 DIAGNOSIS — J20.9 ACUTE BRONCHITIS, UNSPECIFIED ORGANISM: Primary | ICD-10-CM

## 2018-03-30 DIAGNOSIS — R07.0 THROAT PAIN: ICD-10-CM

## 2018-03-30 PROCEDURE — 99213 OFFICE O/P EST LOW 20 MIN: CPT | Performed by: FAMILY MEDICINE

## 2018-03-30 RX ORDER — ALBUTEROL SULFATE 90 UG/1
2 AEROSOL, METERED RESPIRATORY (INHALATION) EVERY 6 HOURS PRN
Qty: 1 INHALER | Refills: 1 | Status: SHIPPED | OUTPATIENT
Start: 2018-03-30 | End: 2020-12-10

## 2018-03-30 RX ORDER — AZITHROMYCIN 250 MG/1
TABLET, FILM COATED ORAL
Qty: 6 TABLET | Refills: 0 | Status: SHIPPED | OUTPATIENT
Start: 2018-03-30 | End: 2020-12-10

## 2018-03-30 NOTE — PROGRESS NOTES
SUBJECTIVE:                                                    Luis Miguel Barnett is a 62 year old male who presents to clinic today for the following health issues:    Acute Illness   Acute illness concerns: sore throat  Onset: x10 days    Fever: no    Chills/Sweats: YES- both    Headache (location?): no - but having neck aches    Sinus Pressure:YES- post-nasal drainage    Conjunctivitis:  no    Ear Pain: YES: bilateral    Rhinorrhea: YES- green    Congestion: YES- a little in the mornings    Sore Throat: YES- constant     Cough: YES-productive of green sputum, worsening over time    Wheeze: no    Decreased Appetite: YES    Nausea: no    Vomiting: no    Diarrhea:  YES- off and on    Dysuria/Freq.: no    Fatigue/Achiness: YES- both    Sick/Strep Exposure: no     Therapies Tried and outcome: Nyquil and cough drops - no relief      Problem list and histories reviewed & adjusted, as indicated.  Additional history: as documented    Reviewed and updated as needed this visit by clinical staff  Tobacco  Allergies  Meds  Med Hx  Surg Hx  Fam Hx  Soc Hx      Reviewed and updated as needed this visit by Provider       Patient Active Problem List   Diagnosis     Presbyopia     Hyperlipidemia LDL goal <70     Hypertension goal BP (blood pressure) < 130/80     Advanced directives, counseling/discussion     Coronary artery disease involving native coronary artery without angina pectoris       Current Outpatient Prescriptions   Medication Sig Dispense Refill     Throat Lozenges (COUGH DROPS MT)        Pseudoeph-Doxylamine-DM-APAP (NYQUIL PO)        ezetimibe-simvastatin (VYTORIN) 10-40 MG per tablet Take 1 tablet by mouth At Bedtime 90 tablet 3     lisinopril (PRINIVIL/ZESTRIL) 5 MG tablet Take 1 tablet (5 mg) by mouth daily 90 tablet 3     metoprolol (TOPROL-XL) 25 MG 24 hr tablet Take 1 tablet (25 mg) by mouth daily 90 tablet 3     clopidogrel (PLAVIX) 75 MG tablet ONE DAILY 90 tablet 3     nitroglycerin (NITROSTAT) 0.4 MG  "SL tablet Place 1 tablet (0.4 mg) under the tongue every 5 minutes as needed for chest pain 25 tablet 3     ascorbic acid (VITAMIN C) 500 MG tablet Take by mouth daily       Multiple Vitamin (MULTIVITAMINS PO) Take 1 tablet by mouth daily       ASPIRIN 81 MG OR TABS ONE DAILY 100 3        No Known Allergies       ROS:   ROS: 12 point ROS neg other than the symptoms noted above    OBJECTIVE:                                                    /72 (BP Location: Right arm, Patient Position: Chair, Cuff Size: Adult Large)  Pulse 75  Temp 98  F (36.7  C) (Oral)  Ht 5' 6\" (1.676 m)  Wt 154 lb 3.2 oz (69.9 kg)  SpO2 95%  BMI 24.89 kg/m2  Body mass index is 24.89 kg/(m^2).   GENERAL: healthy, alert, well nourished, well hydrated, no distress  HENT: ear canals- normal; TMs- normal; Nose- normal; Mouth- no ulcers, no lesions  NECK: no tenderness, no adenopathy, no asymmetry, no masses, no stiffness; thyroid- normal to palpation  RESP: lungs clear to auscultation - no rales, no rhonchi, no wheezes  CV: regular rates and rhythm, normal S1 S2, no S3 or S4 and no murmur, no click or rub -  ABDOMEN: soft, no tenderness, no  hepatosplenomegaly, no masses, normal bowel sounds  MS: extremities- no gross deformities noted, no edema    Diagnostic test results:  none   -- pt declines any influenza and strep testing today.      ASSESSMENT/PLAN:                                                        ICD-10-CM    1. Acute bronchitis, unspecified organism J20.9 azithromycin (ZITHROMAX) 250 MG tablet     albuterol (PROAIR HFA/PROVENTIL HFA/VENTOLIN HFA) 108 (90 BASE) MCG/ACT Inhaler   2. Throat pain R07.0      Pt declines any prescription cough syrup at this time. Cough not keeping him up at night.   See Patient Instructions/          Winnie Harrington MD    [facility]    "

## 2018-03-30 NOTE — PATIENT INSTRUCTIONS
Acute Bronchitis                  What is acute bronchitis?   Bronchitis is an infection of the air passages--that is, the tubes that connect the windpipe to the lungs. It causes swelling and irritation of the airways. With acute bronchitis you usually have a cough that produces phlegm and pain behind the breastbone when you breathe deeply or cough.   How does it occur?   Bronchitis often occurs with viral infections of the respiratory tract, such as colds and flu. Bronchitis may also be caused by bacterial infections. It may occur with childhood illnesses such as measles and whooping cough.   Attacks are most frequent during the winter or when the level of air pollution is high.   Infants, young children, older adults, smokers, and people with heart disease or lung disease (including asthma and allergies) are most likely to get acute bronchitis.   What are the symptoms?   Symptoms may include:   a deep cough that produces yellowish or greenish phlegm   pain behind the breastbone when you breathe deeply or cough   wheezing   feeling short of breath   fever   chills   headache   sore muscles.   How is it diagnosed?   Your healthcare provider will ask about your symptoms and examine you. You may have tests, such as:   a test of phlegm to look for bacteria   chest X-ray   blood tests.   How is it treated?   Acute bronchitis often does not require medical treatment. Resting at home and drinking plenty of fluids to keep the mucus loose may be all you need to do to get better in a few days. If your symptoms are severe or you have other health problems (such as heart or lung disease or diabetes), you may need to take antibiotics.   How long will the effects last?   Most of the time acute bronchitis clears up in a few days. Your cough may slowly get better in 1 to 2 weeks.   It may take you longer to recover if:   You are a smoker.   You live in an area where air pollution is a problem.   You have a heart  or lung disease.   You have any other continuing health problems.   How can I take care of myself?   You can help yourself by:   following the full treatment your healthcare provider recommends   using a vaporizer, humidifier, or steam from hot water to add moisture to the air   drinking plenty of liquids   taking cough medicine if recommended by your healthcare provider   resting in bed   taking aspirin or acetaminophen to reduce fever and relieve headache and muscle pain (Check with your healthcare provider before you give any medicine that contains aspirin or salicylates to a child or teen. This includes medicines like baby aspirin, some cold medicines, and Pepto Bismol. Children and teens who take aspirin are at risk for a serious illness called Reye's syndrome.)   eating healthy meals.   Call your healthcare provider if:   You have trouble breathing.   You have a fever of 101.5?F (38.6?C) or higher.   You cough up blood.   Your symptoms are getting worse instead of better.   You don't start to feel better after 3 days of treatment.   You have any symptoms that concern you.   How can I help prevent acute bronchitis?   To reduce your risk of getting a respiratory infection:   Do not smoke.   Wash your hands often, especially when you are around people with colds (upper respiratory infections).   If you have asthma or allergies, keep your symptoms under good control.   Get regular exercise.   Eat healthy foods.       Published by [employer].  This content is reviewed periodically and is subject to change as new health information becomes available. The information is intended to inform and educate and is not a replacement for medical evaluation, advice, diagnosis or treatment by a healthcare professional.   Developed by [employer].   ? 2010 [facility] and/or its affiliates. All Rights Reserved.   Copyright   [employer] 2011               Thank you for choosing [facility]  for  your Health Care. It was a pleasure seeing you at your visit today. Please contact us with any questions or concerns you may have.                   Winnie Harrington MD                                  To reach your [facility] team after hours call:   [phone]    Our clinic hours are:     Monday- 7:30 am - 7:00 pm                             Tuesday through Friday- 7:30 am - 5:00 pm                                        Saturday- 8:00 am - 12:00 pm                  Phone:  [phone]    Our pharmacy hours are:     Monday  8:00 am to 7:00 pm      Tuesday through Friday 8:00am to 6:00pm                        Saturday - 9:00 am to 1:00 pm      Sunday : Closed.              Phone:  [phone]      There is also information available at our web site:  [email or web address]    If your provider ordered any lab tests and you do not receive the results within 10 business days, please call the clinic.    If you need a medication refill please contact your pharmacy.  Please allow 2 business days for your refill to be completed.    Our clinic offers telephone visits and e visits.  Please ask one of your team members to explain more.      Use "[employer]hart (secure email communication and access to your chart) to send your primary care provider a message or make an appointment. Ask someone on your Team how to sign up for [employer].

## 2018-03-30 NOTE — NURSING NOTE
"Chief Complaint   Patient presents with     Pharyngitis       Initial /72 (BP Location: Right arm, Patient Position: Chair, Cuff Size: Adult Large)  Pulse 75  Temp 98  F (36.7  C) (Oral)  Ht 5' 6\" (1.676 m)  Wt 154 lb 3.2 oz (69.9 kg)  SpO2 95%  BMI 24.89 kg/m2 Estimated body mass index is 24.89 kg/(m^2) as calculated from the following:    Height as of this encounter: 5' 6\" (1.676 m).    Weight as of this encounter: 154 lb 3.2 oz (69.9 kg).  Medication Reconciliation: complete   Rossana Pineda CMA  "

## 2018-03-30 NOTE — MR AVS SNAPSHOT
After Visit Summary   3/30/2018    Luis Miguel Barnett    MRN: [id number]           Patient Information     Date Of Birth          1955        Visit Information        Provider Department      3/30/2018 11:45 AM Winnie Harrington MD [facility]        Today's Diagnoses     Acute bronchitis, unspecified organism    -  1    Throat pain          Care Instructions                       Acute Bronchitis                  What is acute bronchitis?   Bronchitis is an infection of the air passages--that is, the tubes that connect the windpipe to the lungs. It causes swelling and irritation of the airways. With acute bronchitis you usually have a cough that produces phlegm and pain behind the breastbone when you breathe deeply or cough.   How does it occur?   Bronchitis often occurs with viral infections of the respiratory tract, such as colds and flu. Bronchitis may also be caused by bacterial infections. It may occur with childhood illnesses such as measles and whooping cough.   Attacks are most frequent during the winter or when the level of air pollution is high.   Infants, young children, older adults, smokers, and people with heart disease or lung disease (including asthma and allergies) are most likely to get acute bronchitis.   What are the symptoms?   Symptoms may include:   a deep cough that produces yellowish or greenish phlegm   pain behind the breastbone when you breathe deeply or cough   wheezing   feeling short of breath   fever   chills   headache   sore muscles.   How is it diagnosed?   Your healthcare provider will ask about your symptoms and examine you. You may have tests, such as:   a test of phlegm to look for bacteria   chest X-ray   blood tests.   How is it treated?   Acute bronchitis often does not require medical treatment. Resting at home and drinking plenty of fluids to keep the mucus loose may be all you need to do to get better in a few days. If your symptoms  are severe or you have other health problems (such as heart or lung disease or diabetes), you may need to take antibiotics.   How long will the effects last?   Most of the time acute bronchitis clears up in a few days. Your cough may slowly get better in 1 to 2 weeks.   It may take you longer to recover if:   You are a smoker.   You live in an area where air pollution is a problem.   You have a heart or lung disease.   You have any other continuing health problems.   How can I take care of myself?   You can help yourself by:   following the full treatment your healthcare provider recommends   using a vaporizer, humidifier, or steam from hot water to add moisture to the air   drinking plenty of liquids   taking cough medicine if recommended by your healthcare provider   resting in bed   taking aspirin or acetaminophen to reduce fever and relieve headache and muscle pain (Check with your healthcare provider before you give any medicine that contains aspirin or salicylates to a child or teen. This includes medicines like baby aspirin, some cold medicines, and Pepto Bismol. Children and teens who take aspirin are at risk for a serious illness called Reye's syndrome.)   eating healthy meals.   Call your healthcare provider if:   You have trouble breathing.   You have a fever of 101.5?F (38.6?C) or higher.   You cough up blood.   Your symptoms are getting worse instead of better.   You don't start to feel better after 3 days of treatment.   You have any symptoms that concern you.   How can I help prevent acute bronchitis?   To reduce your risk of getting a respiratory infection:   Do not smoke.   Wash your hands often, especially when you are around people with colds (upper respiratory infections).   If you have asthma or allergies, keep your symptoms under good control.   Get regular exercise.   Eat healthy foods.       Published by [employer].  This content is reviewed periodically and is subject to change as new  health information becomes available. The information is intended to inform and educate and is not a replacement for medical evaluation, advice, diagnosis or treatment by a healthcare professional.   Developed by [employer].   ? 2010 [facility] and/or its affiliates. All Rights Reserved.   Copyright   [employer] Reference Systems 2011               Thank you for choosing [facility]  for your Health Care. It was a pleasure seeing you at your visit today. Please contact us with any questions or concerns you may have.                   Winnie Harrington MD                                  To reach your [facility] care team after hours call:   [phone]    Our clinic hours are:     Monday- 7:30 am - 7:00 pm                             Tuesday through Friday- 7:30 am - 5:00 pm                                        Saturday- 8:00 am - 12:00 pm                  Phone:  [phone]    Our pharmacy hours are:     Monday  8:00 am to 7:00 pm      Tuesday through Friday 8:00am to 6:00pm                        Saturday - 9:00 am to 1:00 pm      Sunday : Closed.              Phone:  [phone]      There is also information available at our web site:  [email or web address]    If your provider ordered any lab tests and you do not receive the results within 10 business days, please call the clinic.    If you need a medication refill please contact your pharmacy.  Please allow 2 business days for your refill to be completed.    Our clinic offers telephone visits and e visits.  Please ask one of your team members to explain more.      Use [employer] (secure email communication and access to your chart) to send your primary care provider a message or make an appointment. Ask someone on your Team how to sign up for [employer].                             Follow-ups after your visit        Who to contact     If you have questions or need follow up information about today's clinic visit or your  "schedule please contact [facility] directly at [phone].  Normal or non-critical lab and imaging results will be communicated to you by MyChart, letter or phone within 4 business days after the clinic has received the results. If you do not hear from us within 7 days, please contact the clinic through [employer] [employer] or phone. If you have a critical or abnormal lab result, we will notify you by phone as soon as possible.  Submit refill requests through [facility] or call your pharmacy and they will forward the refill request to us. Please allow 3 business days for your refill to be completed.          Additional Information About Your Visit        Leti ArtsharBeaming Information     [facility] lets you send messages to your doctor, view your test results, renew your prescriptions, schedule appointments and more. To sign up, go to [email or web address] . Click on \"Log in\" on the left side of the screen, which will take you to the Welcome page. Then click on \"Sign up Now\" on the right side of the page.     You will be asked to enter the access code listed below, as well as some personal information. Please follow the directions to create your username and password.     Your access code is: [email or web address]  Expires: 2018 12:56 PM     Your access code will  in 90 days. If you need help or a new code, please call your [facility] clinic or [phone].        Care EveryWhere ID     This is your Care EveryWhere ID. This could be used by other organizations to access your [facility] medical records  [id number]        Your Vitals Were     Pulse Temperature Height Pulse Oximetry BMI (Body Mass Index)       75 98  F (36.7  C) (Oral) 5' 6\" (1.676 m) 95% 24.89 kg/m2        Blood Pressure from Last 3 Encounters:   18 102/72   17 112/66   17 116/82    Weight from Last 3 Encounters:   18 154 lb 3.2 oz (69.9 kg)   17 158 lb 6.4 oz (71.8 kg)   17 158 lb (71.7 kg)              Today, you had the " following     No orders found for display         Today's Medication Changes          These changes are accurate as of 3/30/18 12:56 PM.  If you have any questions, ask your nurse or doctor.               Start taking these medicines.        Dose/Directions    albuterol 108 (90 BASE) MCG/ACT Inhaler   Commonly known as:  PROAIR HFA/PROVENTIL HFA/VENTOLIN HFA   Used for:  Acute bronchitis, unspecified organism   Started by:  Winnie Harrington MD        Dose:  2 puff   Inhale 2 puffs into the lungs every 6 hours as needed for shortness of breath / dyspnea or wheezing   Quantity:  1 Inhaler   Refills:  1       azithromycin 250 MG tablet   Commonly known as:  ZITHROMAX   Used for:  Acute bronchitis, unspecified organism   Started by:  Winnie Harrington MD        2 tabs first day, then 1 tab by mouth daily for 4 additional days   Quantity:  6 tablet   Refills:  0         Stop taking these medicines if you haven't already. Please contact your care team if you have questions.     neomycin-polymyxin-hydrocortisone [id number] otic suspension   Commonly known as:  CORTISPORIN   Stopped by:  Winnie Harrington MD                Where to get your medicines      These medications were sent to [facility] [place] [address]     Phone:  [phone]     albuterol 108 (90 BASE) MCG/ACT Inhaler    azithromycin 250 MG tablet                Primary Care Provider Office Phone # Fax #    Tommie King [phone]       [address] [place], [place] [address]        Equal Access to Services     [facility] : Hadii eitan royo Sonasim, waaxda luqadaha, qaybta kaalmada adeegyada, nina rivera. So [facility] [phone].    ATENCIÓN: Si habla español, tiene a luo disposición servicios gratuitos de asistencia lingüística. Llame al [phone].    We comply with applicable federal  civil rights laws and [place] laws. We do not discriminate on the basis of race, color, national origin, age, disability, sex, sexual orientation, or gender identity.            Thank you!     Thank you for choosing [facility]  for your care. Our goal is always to provide you with excellent care. Hearing back from our patients is one way we can continue to improve our services. Please take a few minutes to complete the written survey that you may receive in the mail after your visit with us. Thank you!             Your Updated Medication List - Protect others around you: Learn how to safely use, store and throw away your medicines at www.disposemymeds.org.          This list is accurate as of 3/30/18 12:56 PM.  Always use your most recent med list.                   Brand Name Dispense Instructions for use Diagnosis    albuterol 108 (90 BASE) MCG/ACT Inhaler    PROAIR HFA/PROVENTIL HFA/VENTOLIN HFA    1 Inhaler    Inhale 2 puffs into the lungs every 6 hours as needed for shortness of breath / dyspnea or wheezing    Acute bronchitis, unspecified organism       ascorbic acid 500 MG tablet    VITAMIN C     Take by mouth daily        aspirin 81 MG tablet     100    ONE DAILY    Unspecified cardiovascular disease, Pure hypercholesterolemia       azithromycin 250 MG tablet    ZITHROMAX    6 tablet    2 tabs first day, then 1 tab by mouth daily for 4 additional days    Acute bronchitis, unspecified organism       clopidogrel 75 MG tablet    PLAVIX    90 tablet    ONE DAILY    Coronary artery disease involving native coronary artery of native heart without angina pectoris       COUGH DROPS MT           ezetimibe-simvastatin 10-40 MG per tablet    VYTORIN    90 tablet    Take 1 tablet by mouth At Bedtime    Pure hypercholesterolemia       lisinopril 5 MG tablet    PRINIVIL/ZESTRIL    90 tablet    Take 1 tablet (5 mg) by mouth daily    Benign essential hypertension       metoprolol succinate 25 MG 24 hr  tablet    TOPROL-XL    90 tablet    Take 1 tablet (25 mg) by mouth daily    Benign essential hypertension, Coronary artery disease involving native coronary artery of native heart without angina pectoris       MULTIVITAMINS PO      Take 1 tablet by mouth daily        nitroGLYcerin 0.4 MG sublingual tablet    NITROSTAT    25 tablet    Place 1 tablet (0.4 mg) under the tongue every 5 minutes as needed for chest pain    Coronary artery disease involving native coronary artery of native heart without angina pectoris       NYQUIL PO

## 2018-09-28 ENCOUNTER — TRANSFERRED RECORDS (OUTPATIENT)
Dept: HEALTH INFORMATION MANAGEMENT | Facility: CLINIC | Age: 63
End: 2018-09-28

## 2018-10-01 ENCOUNTER — TRANSFERRED RECORDS (OUTPATIENT)
Dept: HEALTH INFORMATION MANAGEMENT | Facility: CLINIC | Age: 63
End: 2018-10-01

## 2018-10-25 ENCOUNTER — HOSPITAL ENCOUNTER (OUTPATIENT)
Dept: CARDIOLOGY | Facility: CLINIC | Age: 63
Discharge: HOME | End: 2018-10-25
Attending: INTERNAL MEDICINE | Admitting: INTERNAL MEDICINE
Payer: COMMERCIAL

## 2018-10-25 DIAGNOSIS — I10 HYPERTENSION GOAL BP (BLOOD PRESSURE) < 130/80: ICD-10-CM

## 2018-10-25 DIAGNOSIS — E78.00 PURE HYPERCHOLESTEROLEMIA: ICD-10-CM

## 2018-10-25 DIAGNOSIS — I25.10 CORONARY ARTERY DISEASE INVOLVING NATIVE CORONARY ARTERY OF NATIVE HEART WITHOUT ANGINA PECTORIS: ICD-10-CM

## 2018-10-25 PROCEDURE — 93350 STRESS TTE ONLY: CPT | Mod: 26 | Performed by: INTERNAL MEDICINE

## 2018-10-25 PROCEDURE — 93325 DOPPLER ECHO COLOR FLOW MAPG: CPT | Mod: 26 | Performed by: INTERNAL MEDICINE

## 2018-10-25 PROCEDURE — 93018 CV STRESS TEST I&R ONLY: CPT | Performed by: INTERNAL MEDICINE

## 2018-10-25 PROCEDURE — 93321 DOPPLER ECHO F-UP/LMTD STD: CPT | Mod: 26 | Performed by: INTERNAL MEDICINE

## 2018-10-25 PROCEDURE — 93350 STRESS TTE ONLY: CPT | Mod: TC

## 2018-10-25 PROCEDURE — 93016 CV STRESS TEST SUPVJ ONLY: CPT | Performed by: INTERNAL MEDICINE

## 2018-10-30 ENCOUNTER — TELEPHONE (OUTPATIENT)
Dept: CARDIOLOGY | Facility: CLINIC | Age: 63
End: 2018-10-30

## 2018-10-30 DIAGNOSIS — I25.10 CAD (CORONARY ARTERY DISEASE): Primary | ICD-10-CM

## 2018-10-30 NOTE — TELEPHONE ENCOUNTER
Chart prep for visit with Dr. Bustillo on 11/2/18. Pt is being seen for annual follow up for CAD, HTN and hyperlipidemia. Noted pt has not had any lab work since 2016 and is prescribed statin and ace inhibitor by Dr. Bustillo. Called and spoke to pt. Pt was agreeable to schedule fasting labs prior to visit. Orders placed for lipid panel, ALT and BMP. Pt transferred to scheduling to make lab appointment.

## 2018-11-02 ENCOUNTER — OFFICE VISIT (OUTPATIENT)
Dept: CARDIOLOGY | Facility: CLINIC | Age: 63
End: 2018-11-02
Attending: INTERNAL MEDICINE
Payer: COMMERCIAL

## 2018-11-02 VITALS
HEIGHT: 67 IN | HEART RATE: 56 BPM | BODY MASS INDEX: 25.43 KG/M2 | WEIGHT: 162 LBS | SYSTOLIC BLOOD PRESSURE: 115 MMHG | DIASTOLIC BLOOD PRESSURE: 69 MMHG

## 2018-11-02 DIAGNOSIS — I10 HYPERTENSION GOAL BP (BLOOD PRESSURE) < 130/80: ICD-10-CM

## 2018-11-02 DIAGNOSIS — I25.10 CAD (CORONARY ARTERY DISEASE): ICD-10-CM

## 2018-11-02 DIAGNOSIS — I25.10 CORONARY ARTERY DISEASE INVOLVING NATIVE CORONARY ARTERY OF NATIVE HEART WITHOUT ANGINA PECTORIS: ICD-10-CM

## 2018-11-02 DIAGNOSIS — E78.00 PURE HYPERCHOLESTEROLEMIA: ICD-10-CM

## 2018-11-02 LAB
ALT SERPL W P-5'-P-CCNC: 19 U/L (ref 5–30)
ANION GAP SERPL CALCULATED.3IONS-SCNC: 10.4 MMOL/L (ref 6–17)
BUN SERPL-MCNC: 14 MG/DL (ref 7–30)
CALCIUM SERPL-MCNC: 9.5 MG/DL (ref 8.5–10.5)
CHLORIDE SERPL-SCNC: 101 MMOL/L (ref 98–107)
CHOLEST SERPL-MCNC: 142 MG/DL
CO2 SERPL-SCNC: 31 MMOL/L (ref 23–29)
CREAT SERPL-MCNC: 0.92 MG/DL (ref 0.7–1.3)
GFR SERPL CREATININE-BSD FRML MDRD: 83 ML/MIN/1.7M2
GLUCOSE SERPL-MCNC: 98 MG/DL (ref 70–105)
HDLC SERPL-MCNC: 39 MG/DL
LDLC SERPL CALC-MCNC: 64 MG/DL
NONHDLC SERPL-MCNC: 103 MG/DL
POTASSIUM SERPL-SCNC: 4.4 MMOL/L (ref 3.5–5.1)
SODIUM SERPL-SCNC: 138 MMOL/L (ref 136–145)
TRIGL SERPL-MCNC: 193 MG/DL

## 2018-11-02 PROCEDURE — 36415 COLL VENOUS BLD VENIPUNCTURE: CPT | Performed by: INTERNAL MEDICINE

## 2018-11-02 PROCEDURE — 84460 ALANINE AMINO (ALT) (SGPT): CPT | Performed by: INTERNAL MEDICINE

## 2018-11-02 PROCEDURE — 80061 LIPID PANEL: CPT | Performed by: INTERNAL MEDICINE

## 2018-11-02 PROCEDURE — 99214 OFFICE O/P EST MOD 30 MIN: CPT | Performed by: INTERNAL MEDICINE

## 2018-11-02 PROCEDURE — 80048 BASIC METABOLIC PNL TOTAL CA: CPT | Performed by: INTERNAL MEDICINE

## 2018-11-02 NOTE — PROGRESS NOTES
Service Date: 11/02/2018      REFERRING PHYSICIAN:  Dr. Tommie King.      HISTORY OF PRESENT ILLNESS:  Mr. Barnett is a pleasant 63-year-old male with a history of coronary disease.  He has had multiple revascularizations of his left anterior descending artery at the bifurcation of the diagonal.  He comes into clinic today for a followup visit.  We did a routine stress test to monitor for progression of ischemia.  He exercised for 11 minutes on a standard Vipin protocol without symptoms.  His EKG was slightly abnormal, but his echocardiogram did not suggest regional wall motion abnormality and augmented appropriately.  In the past we have detected problems for him through the use of a stress echo.  It has been very accurate in determining if he has blockage.  He has not had any symptoms over the past year.  He is feeling pretty healthy.  We did check a cholesterol profile today and I reviewed this with him.  His slight decrease in his HDL to 39, LDL is low at 64, but his triglycerides are up from 2 years ago to 193.  We talked about dietary changes to make in order to improve upon these numbers and also I would ask him to do some aerobic exercise.      PHYSICAL EXAMINATION:   VITAL SIGNS:  He is normotensive 115/69, pulse of 56, weight 162.   NECK:  Carotid upstrokes are brisk without bruit.   CARDIOVASCULAR:  Tones are regular without murmur, gallop or rub.   LUNGS:  Clear posteriorly.   EXTREMITIES:  He has strong symmetric pulses in the upper and lower extremities without peripheral edema.      SUMMARY:  Mr. Barnett is a pleasant 63-year-old male with a history of coronary disease, specifically involving the left anterior descending artery at the bifurcation.  He is asymptomatic from a cardiac perspective.  His stress echocardiogram does not indicate progression of ischemia or obstructive coronary disease.  We talked a little bit about lifestyle modifications that he needs to improve upon to reduce his risk for  future cardiovascular events based on his cholesterol numbers.  I would like to see him reduce his sweets and get some aerobic exercise on a daily basis.  I will recommend a followup on an annual basis with a repeat stress echocardiogram and next year we will recheck his cholesterol as well.      Please feel free to contact me with any questions you have in regards to his care.      cc:   [place] [address]         JS RODRIGUEZ DO             D: 2018   T: 2018   MT: HANNA      Name:     STEPHANE ROGERS   MRN:      [id number]        Account:      [id number]   :      1955           Service Date: 2018      Document: [id number]

## 2018-11-02 NOTE — LETTER
11/2/2018    Tommie King MD  [address] [facility] [address]    RE: Luis Miguel Barnett       Dear Colleague,    I had the pleasure of seeing Luis Miguel Barnett in the [facility].    HPI and Plan:   See dictation    Orders Placed This Encounter   Procedures     Lipid Profile     ALT     Follow-Up with Cardiologist     Exercise Stress Echocardiogram       No orders of the defined types were placed in this encounter.      There are no discontinued medications.      Encounter Diagnoses   Name Primary?     Coronary artery disease involving native coronary artery of native heart without angina pectoris      Hypertension goal BP (blood pressure) < 130/80      Pure hypercholesterolemia        CURRENT MEDICATIONS:  Current Outpatient Prescriptions   Medication Sig Dispense Refill     albuterol (PROAIR HFA/PROVENTIL HFA/VENTOLIN HFA) 108 (90 BASE) MCG/ACT Inhaler Inhale 2 puffs into the lungs every 6 hours as needed for shortness of breath / dyspnea or wheezing 1 Inhaler 1     ascorbic acid (VITAMIN C) 500 MG tablet Take by mouth daily       ASPIRIN 81 MG OR TABS ONE DAILY 100 3     azithromycin (ZITHROMAX) 250 MG tablet 2 tabs first day, then 1 tab by mouth daily for 4 additional days (Patient not taking: Reported on 11/2/2018) 6 tablet 0     clopidogrel (PLAVIX) 75 MG tablet ONE DAILY 90 tablet 3     ezetimibe-simvastatin (VYTORIN) 10-40 MG per tablet Take 1 tablet by mouth At Bedtime 90 tablet 3     lisinopril (PRINIVIL/ZESTRIL) 5 MG tablet Take 1 tablet (5 mg) by mouth daily 90 tablet 3     metoprolol (TOPROL-XL) 25 MG 24 hr tablet Take 1 tablet (25 mg) by mouth daily 90 tablet 3     Multiple Vitamin (MULTIVITAMINS PO) Take 1 tablet by mouth daily       nitroglycerin (NITROSTAT) 0.4 MG SL tablet Place 1 tablet (0.4 mg) under the tongue every 5 minutes as needed for chest pain 25 tablet 3     Pseudoeph-Doxylamine-DM-APAP (NYQUIL PO)        Throat Lozenges (COUGH DROPS MT)           ALLERGIES   No Known Allergies    PAST MEDICAL HISTORY:  Past Medical History:   Diagnosis Date     Antiplatelet or antithrombotic long-term use      Coronary artery disease 2007    multivessel stenting - JULIETA mid LAD x2 and JULIETA 1st diagonal, 2008 complete obstruction 1st diagonal with successful thrombectomy and JULIETA, 2013 in stent restenosis mid LAD (25%), prox to LAD stent had in segment restenosis of 50%, 50-70% in stent restenosis diagonal     Hypertension      Presbyopia      Pure hypercholesterolemia      Stented coronary artery      Unspecified cardiovascular disease 6/07, 11/08       PAST SURGICAL HISTORY:  Past Surgical History:   Procedure Laterality Date     HEART CATH, ANGIOPLASTY      multivessel stenting - JULIETA mid LAD x2 and JULIETA 1st diagonal, 2008 complete obstruction 1st diagonal with successful thrombectomy and JULIETA, 2013 in stent restenosis mid LAD (25%), prox to LAD stent had in segment restenosis of 50%, 50-70% in stent restenosis diagonal     HERNIORRHAPHY INGUINAL  11/16/2012    Procedure: HERNIORRHAPHY INGUINAL;  left inguinal hernia repair with Mesh ;  Surgeon: Cam Lancaster MD;  Location: [facility] OR     SURGICAL HISTORY OF -   1994    s/p vasectomy      SURGICAL HISTORY OF -   6/07, 11/08, 2/10    stent x 2 - LAD/Diagonal - Dr Gastelum - stent 11/08 - mid LAD     SURGICAL HISTORY OF -   10/10    stress echo normal       FAMILY HISTORY:  Family History   Problem Relation Age of Onset     Alzheimer Disease Mother      HEART DISEASE Father      MI     C.A.D. Brother 48     Stents     Family History Negative Other        SOCIAL HISTORY:  Social History     Social History     Marital status:      Spouse name: N/A     Number of children: N/A     Years of education: N/A     Social History Main Topics     Smoking status: Former Smoker     Packs/day: 1.00     Years: 30.00     Start date: 1971     Quit date: 4/1/2001     Smokeless tobacco: Never Used      Comment: quit 4/2001     Alcohol use No  "    Drug use: No     Sexual activity: Yes     Other Topics Concern     Parent/Sibling W/ Cabg, Mi Or Angioplasty Before 65f 55m? Yes     Caffeine Concern Yes     1 soda a day     Sleep Concern No     does not sleep well some nights     Stress Concern No     Special Diet No     Exercise Yes     Walking 2 days per week, working PT     Seat Belt Yes     Social History Narrative       Review of Systems:  Skin:  Negative       Eyes:  Positive for glasses    ENT:  Negative      Respiratory:  Negative       Cardiovascular:  Negative;palpitations;chest pain;lightheadedness;syncope or near-syncope;cyanosis;fatigue;edema      Gastroenterology: Negative      Genitourinary:  Negative      Musculoskeletal:  Positive for back pain    Neurologic:  Negative      Psychiatric:  Positive for      Heme/Lymph/Imm:  Negative      Endocrine:  Negative        Physical Exam:  Vitals: /69 (BP Location: Left arm, Cuff Size: Adult Large)  Pulse 56  Ht 1.702 m (5' 7\")  Wt 73.5 kg (162 lb)  BMI 25.37 kg/m2    Constitutional:  cooperative, alert and oriented, well developed, well nourished, in no acute distress        Skin:  warm and dry to the touch          Head:  normocephalic, no masses or lesions        Eyes:  pupils equal and round        Lymph:      ENT:  no pallor or cyanosis, dentition good        Neck:  carotid pulses are full and equal bilaterally        Respiratory:  clear to auscultation;normal symmetry         Cardiac: regular rhythm;no murmurs, gallops or rubs detected                pulses full and equal, no bruits auscultated                                        GI:  abdomen soft;no bruits        Extremities and Muscular Skeletal:  no deformities, clubbing, cyanosis, erythema observed              Neurological:  no gross motor deficits        Psych:  Alert and Oriented x 3          CC  Saniya Bustillo,   [address]  [place] [address]                    Thank you for allowing me to participate in " the care of your patient.      Sincerely,     Saniya Bustillo DO     [facility]    cc:   Saniya Bustillo DO  [address] [place] [id number]

## 2018-11-02 NOTE — LETTER
11/2/2018      Tommie King MD  [address] [facility] [address]      RE: Lusi Miguel Barnett       Dear Colleague,    I had the pleasure of seeing Luis Miguel Barnett in the [facility].    Service Date: 11/02/2018      REFERRING PHYSICIAN:  Dr. Tommie King.      HISTORY OF PRESENT ILLNESS:  Mr. Barnett is a pleasant 63-year-old male with a history of coronary disease.  He has had multiple revascularizations of his left anterior descending artery at the bifurcation of the diagonal.  He comes into clinic today for a followup visit.  We did a routine stress test to monitor for progression of ischemia.  He exercised for 11 minutes on a standard Vipin protocol without symptoms.  His EKG was slightly abnormal, but his echocardiogram did not suggest regional wall motion abnormality and augmented appropriately.  In the past we have detected problems for him through the use of a stress echo.  It has been very accurate in determining if he has blockage.  He has not had any symptoms over the past year.  He is feeling pretty healthy.  We did check a cholesterol profile today and I reviewed this with him.  His slight decrease in his HDL to 39, LDL is low at 64, but his triglycerides are up from 2 years ago to 193.  We talked about dietary changes to make in order to improve upon these numbers and also I would ask him to do some aerobic exercise.      PHYSICAL EXAMINATION:   VITAL SIGNS:  He is normotensive 115/69, pulse of 56, weight 162.   NECK:  Carotid upstrokes are brisk without bruit.   CARDIOVASCULAR:  Tones are regular without murmur, gallop or rub.   LUNGS:  Clear posteriorly.   EXTREMITIES:  He has strong symmetric pulses in the upper and lower extremities without peripheral edema.      SUMMARY:  Mr. Barnett is a pleasant 63-year-old male with a history of coronary disease, specifically involving the left anterior descending artery at the bifurcation.  He is asymptomatic from a cardiac  perspective.  His stress echocardiogram does not indicate progression of ischemia or obstructive coronary disease.  We talked a little bit about lifestyle modifications that he needs to improve upon to reduce his risk for future cardiovascular events based on his cholesterol numbers.  I would like to see him reduce his sweets and get some aerobic exercise on a daily basis.  I will recommend a followup on an annual basis with a repeat stress echocardiogram and next year we will recheck his cholesterol as well.      Please feel free to contact me with any questions you have in regards to his care.      cc:   [place] [address]         JS RODRIGUEZ DO             D: 2018   T: 2018   MT: HANNA      Name:     STEPHANE ROGERS   MRN:      [id number]        Account:      [id number]   :      1955           Service Date: 2018      Document: [id number]         Outpatient Encounter Prescriptions as of 2018   Medication Sig Dispense Refill     albuterol (PROAIR HFA/PROVENTIL HFA/VENTOLIN HFA) 108 (90 BASE) MCG/ACT Inhaler Inhale 2 puffs into the lungs every 6 hours as needed for shortness of breath / dyspnea or wheezing 1 Inhaler 1     ascorbic acid (VITAMIN C) 500 MG tablet Take by mouth daily       ASPIRIN 81 MG OR TABS ONE DAILY 100 3     azithromycin (ZITHROMAX) 250 MG tablet 2 tabs first day, then 1 tab by mouth daily for 4 additional days (Patient not taking: Reported on 2018) 6 tablet 0     clopidogrel (PLAVIX) 75 MG tablet ONE DAILY 90 tablet 3     ezetimibe-simvastatin (VYTORIN) 10-40 MG per tablet Take 1 tablet by mouth At Bedtime 90 tablet 3     lisinopril (PRINIVIL/ZESTRIL) 5 MG tablet Take 1 tablet (5 mg) by mouth daily 90 tablet 3     metoprolol (TOPROL-XL) 25 MG 24 hr tablet Take 1 tablet (25 mg) by mouth daily 90 tablet 3     Multiple Vitamin (MULTIVITAMINS PO) Take 1 tablet by mouth  daily       nitroglycerin (NITROSTAT) 0.4 MG SL tablet Place 1 tablet (0.4 mg) under the tongue every 5 minutes as needed for chest pain 25 tablet 3     Pseudoeph-Doxylamine-DM-APAP (NYQUIL PO)        Throat Lozenges (COUGH DROPS MT)        No facility-administered encounter medications on file as of 11/2/2018.        Again, thank you for allowing me to participate in the care of your patient.      Sincerely,    Saniya Bustillo DO     [facility]

## 2018-11-02 NOTE — PROGRESS NOTES
HPI and Plan:   See dictation    Orders Placed This Encounter   Procedures     Lipid Profile     ALT     Follow-Up with Cardiologist     Exercise Stress Echocardiogram       No orders of the defined types were placed in this encounter.      There are no discontinued medications.      Encounter Diagnoses   Name Primary?     Coronary artery disease involving native coronary artery of native heart without angina pectoris      Hypertension goal BP (blood pressure) < 130/80      Pure hypercholesterolemia        CURRENT MEDICATIONS:  Current Outpatient Prescriptions   Medication Sig Dispense Refill     albuterol (PROAIR HFA/PROVENTIL HFA/VENTOLIN HFA) 108 (90 BASE) MCG/ACT Inhaler Inhale 2 puffs into the lungs every 6 hours as needed for shortness of breath / dyspnea or wheezing 1 Inhaler 1     ascorbic acid (VITAMIN C) 500 MG tablet Take by mouth daily       ASPIRIN 81 MG OR TABS ONE DAILY 100 3     azithromycin (ZITHROMAX) 250 MG tablet 2 tabs first day, then 1 tab by mouth daily for 4 additional days (Patient not taking: Reported on 11/2/2018) 6 tablet 0     clopidogrel (PLAVIX) 75 MG tablet ONE DAILY 90 tablet 3     ezetimibe-simvastatin (VYTORIN) 10-40 MG per tablet Take 1 tablet by mouth At Bedtime 90 tablet 3     lisinopril (PRINIVIL/ZESTRIL) 5 MG tablet Take 1 tablet (5 mg) by mouth daily 90 tablet 3     metoprolol (TOPROL-XL) 25 MG 24 hr tablet Take 1 tablet (25 mg) by mouth daily 90 tablet 3     Multiple Vitamin (MULTIVITAMINS PO) Take 1 tablet by mouth daily       nitroglycerin (NITROSTAT) 0.4 MG SL tablet Place 1 tablet (0.4 mg) under the tongue every 5 minutes as needed for chest pain 25 tablet 3     Pseudoeph-Doxylamine-DM-APAP (NYQUIL PO)        Throat Lozenges (COUGH DROPS MT)          ALLERGIES   No Known Allergies    PAST MEDICAL HISTORY:  Past Medical History:   Diagnosis Date     Antiplatelet or antithrombotic long-term use      Coronary artery disease 2007    multivessel stenting - JULIETA mid LAD x2 and  JULIETA 1st diagonal, 2008 complete obstruction 1st diagonal with successful thrombectomy and JULIETA, 2013 in stent restenosis mid LAD (25%), prox to LAD stent had in segment restenosis of 50%, 50-70% in stent restenosis diagonal     Hypertension      Presbyopia      Pure hypercholesterolemia      Stented coronary artery      Unspecified cardiovascular disease 6/07, 11/08       PAST SURGICAL HISTORY:  Past Surgical History:   Procedure Laterality Date     HEART CATH, ANGIOPLASTY      multivessel stenting - JULIETA mid LAD x2 and JULIETA 1st diagonal, 2008 complete obstruction 1st diagonal with successful thrombectomy and JULIETA, 2013 in stent restenosis mid LAD (25%), prox to LAD stent had in segment restenosis of 50%, 50-70% in stent restenosis diagonal     HERNIORRHAPHY INGUINAL  11/16/2012    Procedure: HERNIORRHAPHY INGUINAL;  left inguinal hernia repair with Mesh ;  Surgeon: Cam Lancaster MD;  Location: [facility] OR     SURGICAL HISTORY OF -   1994    s/p vasectomy      SURGICAL HISTORY OF -   6/07, 11/08, 2/10    stent x 2 - LAD/Diagonal - Dr Gastelum - stent 11/08 - mid LAD     SURGICAL HISTORY OF -   10/10    stress echo normal       FAMILY HISTORY:  Family History   Problem Relation Age of Onset     Alzheimer Disease Mother      HEART DISEASE Father      MI     C.A.D. Brother 48     Stents     Family History Negative Other        SOCIAL HISTORY:  Social History     Social History     Marital status:      Spouse name: N/A     Number of children: N/A     Years of education: N/A     Social History Main Topics     Smoking status: Former Smoker     Packs/day: 1.00     Years: 30.00     Start date: 1971     Quit date: 4/1/2001     Smokeless tobacco: Never Used      Comment: quit 4/2001     Alcohol use No     Drug use: No     Sexual activity: Yes     Other Topics Concern     Parent/Sibling W/ Cabg, Mi Or Angioplasty Before 65f 55m? Yes     Caffeine Concern Yes     1 soda a day     Sleep Concern No     does not sleep well some  "nights     Stress Concern No     Special Diet No     Exercise Yes     Walking 2 days per week, working PT     Seat Belt Yes     Social History Narrative       Review of Systems:  Skin:  Negative       Eyes:  Positive for glasses    ENT:  Negative      Respiratory:  Negative       Cardiovascular:  Negative;palpitations;chest pain;lightheadedness;syncope or near-syncope;cyanosis;fatigue;edema      Gastroenterology: Negative      Genitourinary:  Negative      Musculoskeletal:  Positive for back pain    Neurologic:  Negative      Psychiatric:  Positive for      Heme/Lymph/Imm:  Negative      Endocrine:  Negative        Physical Exam:  Vitals: /69 (BP Location: Left arm, Cuff Size: Adult Large)  Pulse 56  Ht 1.702 m (5' 7\")  Wt 73.5 kg (162 lb)  BMI 25.37 kg/m2    Constitutional:  cooperative, alert and oriented, well developed, well nourished, in no acute distress        Skin:  warm and dry to the touch          Head:  normocephalic, no masses or lesions        Eyes:  pupils equal and round        Lymph:      ENT:  no pallor or cyanosis, dentition good        Neck:  carotid pulses are full and equal bilaterally        Respiratory:  clear to auscultation;normal symmetry         Cardiac: regular rhythm;no murmurs, gallops or rubs detected                pulses full and equal, no bruits auscultated                                        GI:  abdomen soft;no bruits        Extremities and Muscular Skeletal:  no deformities, clubbing, cyanosis, erythema observed              Neurological:  no gross motor deficits        Psych:  Alert and Oriented x 3          CC  Saniya Bustillo, DO  [address]  [place] [address]                  "

## 2018-11-02 NOTE — MR AVS SNAPSHOT
After Visit Summary   11/2/2018    Luis Miguel Barnett    MRN: [id number]           Patient Information     Date Of Birth          1955        Visit Information        Provider Department      11/2/2018 11:15 AM Saniya Bustillo DO [facility]        Today's Diagnoses     Coronary artery disease involving native coronary artery of native heart without angina pectoris        Hypertension goal BP (blood pressure) < 130/80        Pure hypercholesterolemia           Follow-ups after your visit        Additional Services     Follow-Up with Cardiologist                 Future tests that were ordered for you today     Open Future Orders        Priority Expected Expires Ordered    ALT Routine 11/2/2019 11/2/2019 11/2/2018    Exercise Stress Echocardiogram Routine 11/2/2019 11/3/2019 11/2/2018    Follow-Up with Cardiologist Routine 11/2/2019 11/3/2019 11/2/2018    Lipid Profile Routine 11/2/2019 11/2/2019 11/2/2018            Who to contact     If you have questions or need follow up information about today's clinic visit or your schedule please contact [facility] directly at [phone].  Normal or non-critical lab and imaging results will be communicated to you by MyChart, letter or phone within 4 business days after the clinic has received the results. If you do not hear from us within 7 days, please contact the clinic through MyChart or phone. If you have a critical or abnormal lab result, we will notify you by phone as soon as possible.  Submit refill requests through [employer] or call your pharmacy and they will forward the refill request to us. Please allow 3 business days for your refill to be completed.          Additional Information About Your Visit        Care EveryWhere ID     This is your Care EveryWhere ID. This could be used by other organizations to access your [place] medical records  [id number]       "  Your Vitals Were     Pulse Height BMI (Body Mass Index)             56 1.702 m (5' 7\") 25.37 kg/m2          Blood Pressure from Last 3 Encounters:   11/02/18 115/69   03/30/18 102/72   11/09/17 112/66    Weight from Last 3 Encounters:   11/02/18 73.5 kg (162 lb)   03/30/18 69.9 kg (154 lb 3.2 oz)   11/09/17 71.8 kg (158 lb 6.4 oz)              We Performed the Following     Follow-Up with Cardiologist        Primary Care Provider Office Phone # Fax #    Tommie King [phone]       [address] [address]        Equal Access to Services     LEATHA MENARD : Hadii aad ku hadasho Soomaali, waaxda luqadaha, qaybta kaalmada adeegyada, nina rivera. So [facility] [phone].    ATENCIÓN: Si habla español, tiene a luo disposición servicios gratuitos de asistencia lingüística. [facility] [phone].    We comply with applicable federal civil rights laws and [place] laws. We do not discriminate on the basis of race, color, national origin, age, disability, sex, sexual orientation, or gender identity.            Thank you!     Thank you for choosing [facility]  for your care. Our goal is always to provide you with excellent care. Hearing back from our patients is one way we can continue to improve our services. Please take a few minutes to complete the written survey that you may receive in the mail after your visit with us. Thank you!             Your Updated Medication List - Protect others around you: Learn how to safely use, store and throw away your medicines at www.disposemymeds.org.          This list is accurate as of 11/2/18 11:34 AM.  Always use your most recent med list.                   Brand Name Dispense Instructions for use Diagnosis    albuterol 108 (90 Base) MCG/ACT inhaler    PROAIR HFA/PROVENTIL HFA/VENTOLIN HFA    1 Inhaler    Inhale 2 puffs into the lungs every 6 hours as needed for shortness of " breath / dyspnea or wheezing    Acute bronchitis, unspecified organism       ascorbic acid 500 MG tablet    VITAMIN C     Take by mouth daily        aspirin 81 MG tablet     100    ONE DAILY    Unspecified cardiovascular disease, Pure hypercholesterolemia       azithromycin 250 MG tablet    ZITHROMAX    6 tablet    2 tabs first day, then 1 tab by mouth daily for 4 additional days    Acute bronchitis, unspecified organism       clopidogrel 75 MG tablet    PLAVIX    90 tablet    ONE DAILY    Coronary artery disease involving native coronary artery of native heart without angina pectoris       COUGH DROPS MT           ezetimibe-simvastatin 10-40 MG per tablet    VYTORIN    90 tablet    Take 1 tablet by mouth At Bedtime    Pure hypercholesterolemia       lisinopril 5 MG tablet    PRINIVIL/ZESTRIL    90 tablet    Take 1 tablet (5 mg) by mouth daily    Benign essential hypertension       metoprolol succinate 25 MG 24 hr tablet    TOPROL-XL    90 tablet    Take 1 tablet (25 mg) by mouth daily    Benign essential hypertension, Coronary artery disease involving native coronary artery of native heart without angina pectoris       MULTIVITAMINS PO      Take 1 tablet by mouth daily        nitroGLYcerin 0.4 MG sublingual tablet    NITROSTAT    25 tablet    Place 1 tablet (0.4 mg) under the tongue every 5 minutes as needed for chest pain    Coronary artery disease involving native coronary artery of native heart without angina pectoris       NYQUIL PO

## 2018-11-26 DIAGNOSIS — I25.10 CORONARY ARTERY DISEASE INVOLVING NATIVE CORONARY ARTERY OF NATIVE HEART WITHOUT ANGINA PECTORIS: ICD-10-CM

## 2018-11-26 DIAGNOSIS — I10 BENIGN ESSENTIAL HYPERTENSION: ICD-10-CM

## 2018-11-26 DIAGNOSIS — E78.00 PURE HYPERCHOLESTEROLEMIA: ICD-10-CM

## 2018-11-26 RX ORDER — EZETIMIBE AND SIMVASTATIN 10; 40 MG/1; MG/1
1 TABLET ORAL AT BEDTIME
Qty: 90 TABLET | Refills: 3 | Status: SHIPPED | OUTPATIENT
Start: 2018-11-26 | End: 2019-12-02

## 2018-11-26 RX ORDER — LISINOPRIL 5 MG/1
5 TABLET ORAL DAILY
Qty: 90 TABLET | Refills: 3 | Status: SHIPPED | OUTPATIENT
Start: 2018-11-26 | End: 2019-12-02

## 2018-11-26 RX ORDER — CLOPIDOGREL BISULFATE 75 MG/1
TABLET ORAL
Qty: 90 TABLET | Refills: 3 | Status: SHIPPED | OUTPATIENT
Start: 2018-11-26 | End: 2019-12-02

## 2018-11-26 RX ORDER — NITROGLYCERIN 0.4 MG/1
0.4 TABLET SUBLINGUAL EVERY 5 MIN PRN
Qty: 25 TABLET | Refills: 1 | Status: SHIPPED | OUTPATIENT
Start: 2018-11-26 | End: 2020-09-09

## 2018-11-26 RX ORDER — METOPROLOL SUCCINATE 25 MG/1
25 TABLET, EXTENDED RELEASE ORAL DAILY
Qty: 90 TABLET | Refills: 3 | Status: SHIPPED | OUTPATIENT
Start: 2018-11-26 | End: 2019-12-02

## 2019-09-09 DIAGNOSIS — I10 HYPERTENSION GOAL BP (BLOOD PRESSURE) < 130/80: ICD-10-CM

## 2019-09-09 DIAGNOSIS — I25.10 CORONARY ARTERY DISEASE INVOLVING NATIVE CORONARY ARTERY OF NATIVE HEART WITHOUT ANGINA PECTORIS: ICD-10-CM

## 2019-09-09 DIAGNOSIS — E78.00 PURE HYPERCHOLESTEROLEMIA: ICD-10-CM

## 2019-09-09 DIAGNOSIS — I10 BENIGN ESSENTIAL HYPERTENSION: ICD-10-CM

## 2019-10-29 ENCOUNTER — TRANSFERRED RECORDS (OUTPATIENT)
Dept: HEALTH INFORMATION MANAGEMENT | Facility: CLINIC | Age: 64
End: 2019-10-29

## 2019-11-07 ENCOUNTER — HOSPITAL ENCOUNTER (OUTPATIENT)
Dept: CARDIOLOGY | Facility: CLINIC | Age: 64
Discharge: HOME | End: 2019-11-07
Attending: INTERNAL MEDICINE | Admitting: INTERNAL MEDICINE
Payer: COMMERCIAL

## 2019-11-07 DIAGNOSIS — I25.10 CORONARY ARTERY DISEASE INVOLVING NATIVE CORONARY ARTERY OF NATIVE HEART WITHOUT ANGINA PECTORIS: ICD-10-CM

## 2019-11-07 DIAGNOSIS — I10 HYPERTENSION GOAL BP (BLOOD PRESSURE) < 130/80: ICD-10-CM

## 2019-11-07 DIAGNOSIS — E78.00 PURE HYPERCHOLESTEROLEMIA: ICD-10-CM

## 2019-11-07 LAB
ALT SERPL W P-5'-P-CCNC: 43 U/L (ref 0–70)
CHOLEST SERPL-MCNC: 149 MG/DL
HDLC SERPL-MCNC: 39 MG/DL
LDLC SERPL CALC-MCNC: 72 MG/DL
NONHDLC SERPL-MCNC: 110 MG/DL
TRIGL SERPL-MCNC: 188 MG/DL

## 2019-11-07 PROCEDURE — 93325 DOPPLER ECHO COLOR FLOW MAPG: CPT | Mod: 26 | Performed by: INTERNAL MEDICINE

## 2019-11-07 PROCEDURE — 93018 CV STRESS TEST I&R ONLY: CPT | Performed by: INTERNAL MEDICINE

## 2019-11-07 PROCEDURE — 93350 STRESS TTE ONLY: CPT | Mod: TC

## 2019-11-07 PROCEDURE — 93350 STRESS TTE ONLY: CPT | Mod: 26 | Performed by: INTERNAL MEDICINE

## 2019-11-07 PROCEDURE — 36415 COLL VENOUS BLD VENIPUNCTURE: CPT | Performed by: INTERNAL MEDICINE

## 2019-11-07 PROCEDURE — 93016 CV STRESS TEST SUPVJ ONLY: CPT | Performed by: INTERNAL MEDICINE

## 2019-11-07 PROCEDURE — 80061 LIPID PANEL: CPT | Performed by: INTERNAL MEDICINE

## 2019-11-07 PROCEDURE — 93321 DOPPLER ECHO F-UP/LMTD STD: CPT | Mod: 26 | Performed by: INTERNAL MEDICINE

## 2019-11-07 PROCEDURE — 84460 ALANINE AMINO (ALT) (SGPT): CPT | Performed by: INTERNAL MEDICINE

## 2019-12-02 DIAGNOSIS — E78.00 PURE HYPERCHOLESTEROLEMIA: ICD-10-CM

## 2019-12-02 DIAGNOSIS — I10 BENIGN ESSENTIAL HYPERTENSION: ICD-10-CM

## 2019-12-02 DIAGNOSIS — I25.10 CORONARY ARTERY DISEASE INVOLVING NATIVE CORONARY ARTERY OF NATIVE HEART WITHOUT ANGINA PECTORIS: ICD-10-CM

## 2019-12-02 RX ORDER — LISINOPRIL 5 MG/1
5 TABLET ORAL DAILY
Qty: 90 TABLET | Refills: 0 | Status: SHIPPED | OUTPATIENT
Start: 2019-12-02 | End: 2020-02-25

## 2019-12-02 RX ORDER — METOPROLOL SUCCINATE 25 MG/1
25 TABLET, EXTENDED RELEASE ORAL DAILY
Qty: 90 TABLET | Refills: 0 | Status: SHIPPED | OUTPATIENT
Start: 2019-12-02 | End: 2020-02-25

## 2019-12-02 RX ORDER — EZETIMIBE AND SIMVASTATIN 10; 40 MG/1; MG/1
1 TABLET ORAL AT BEDTIME
Qty: 90 TABLET | Refills: 0 | Status: SHIPPED | OUTPATIENT
Start: 2019-12-02 | End: 2020-02-25

## 2019-12-02 RX ORDER — CLOPIDOGREL BISULFATE 75 MG/1
TABLET ORAL
Qty: 90 TABLET | Refills: 0 | Status: SHIPPED | OUTPATIENT
Start: 2019-12-02 | End: 2020-02-25

## 2019-12-05 ENCOUNTER — OFFICE VISIT (OUTPATIENT)
Dept: CARDIOLOGY | Facility: CLINIC | Age: 64
End: 2019-12-05
Payer: COMMERCIAL

## 2019-12-05 VITALS
BODY MASS INDEX: 25.73 KG/M2 | HEIGHT: 66 IN | DIASTOLIC BLOOD PRESSURE: 78 MMHG | WEIGHT: 160.1 LBS | SYSTOLIC BLOOD PRESSURE: 114 MMHG | HEART RATE: 72 BPM

## 2019-12-05 DIAGNOSIS — E78.00 PURE HYPERCHOLESTEROLEMIA: ICD-10-CM

## 2019-12-05 DIAGNOSIS — I10 BENIGN ESSENTIAL HYPERTENSION: ICD-10-CM

## 2019-12-05 DIAGNOSIS — I10 HYPERTENSION GOAL BP (BLOOD PRESSURE) < 130/80: ICD-10-CM

## 2019-12-05 DIAGNOSIS — I25.10 CORONARY ARTERY DISEASE INVOLVING NATIVE CORONARY ARTERY OF NATIVE HEART WITHOUT ANGINA PECTORIS: Primary | ICD-10-CM

## 2019-12-05 PROCEDURE — 99213 OFFICE O/P EST LOW 20 MIN: CPT | Performed by: INTERNAL MEDICINE

## 2019-12-05 ASSESSMENT — MIFFLIN-ST. JEOR: SCORE: 1463.96

## 2019-12-05 NOTE — LETTER
12/5/2019      Tommie King MD  [address] [facility] [address]      RE: Luis Miguel Barnett       Dear Colleague,    I had the pleasure of seeing Luis Miguel Barnett in the [facility].    Service Date: 12/05/2019      REFERRING PHYSICIAN:  Dr. Tommie King.      HISTORY OF PRESENT ILLNESS:  Mr. Barnett is a very pleasant 64-year-old gentleman with a history of multivessel coronary disease.  He has had multiple revascularizations of his left anterior descending artery at the bifurcation of a diagonal branch.  He has had issues with in-stent restenosis and had to have percutaneous coronary intervention, most recently in 2013.  Since then, he has been fairly stable and asymptomatic.  He is returning for an annual followup visit.  I have asked him to undergo stress echo every year to look for restenosis because this has been such a problematic area at the bifurcation of the diagonal and LAD.  He did not really have typical symptoms of classic angina when he presented.  His stress test did show some EKG abnormalities which has been chronic since his revascularization, but his echocardiogram this year is again normal.  He had a high cardiac workload without any symptoms.  He has been doing well on his medications.  It looks like he had cholesterol profile here in November and he continues to have a mixed hyperlipidemia with total cholesterol of 149, HDL 39, LDL 72 and triglycerides 188.  He is on Vytorin for his cholesterol.      PHYSICAL EXAMINATION:   VITAL SIGNS:  On exam, his blood pressure is normotensive 114/78, pulse is 72, weight 160 with a body mass index of 25.   NECK:  Carotid upstrokes are brisk without bruit.   CARDIOVASCULAR:  Tones are regular without murmur, gallop or rub.   LUNGS:  Clear posteriorly.   EXTREMITIES:  He has no peripheral edema.      SUMMARY:  Mr. Barnett is a very pleasant 64-year-old male with a history of multivessel coronary disease, bifurcational  in-stent restenosis with revascularization last in .  He is stable from a cardiac perspective and remains asymptomatic.  His stress echocardiogram is normal this year.  I will recommend to continue to follow up on an annual basis and monitor for progression of ischemia.  Please feel free to contact me with any questions you have in regards to his care.      cc:   [place] [address]         JS RODRIGUEZ DO             D: 2019   T: 2019   MT: HANNA      Name:     STEPHANE ROGERS   MRN:      [id number]        Account:      [id number]   :      1955           Service Date: 2019      Document: [id number]         Outpatient Encounter Medications as of 2019   Medication Sig Dispense Refill     albuterol (PROAIR HFA/PROVENTIL HFA/VENTOLIN HFA) 108 (90 BASE) MCG/ACT Inhaler Inhale 2 puffs into the lungs every 6 hours as needed for shortness of breath / dyspnea or wheezing 1 Inhaler 1     ascorbic acid (VITAMIN C) 500 MG tablet Take by mouth daily       ASPIRIN 81 MG OR TABS ONE DAILY 100 3     clopidogrel (PLAVIX) 75 MG tablet ONE DAILY 90 tablet 0     ezetimibe-simvastatin (VYTORIN) 10-40 MG tablet Take 1 tablet by mouth At Bedtime 90 tablet 0     lisinopril (PRINIVIL/ZESTRIL) 5 MG tablet Take 1 tablet (5 mg) by mouth daily 90 tablet 0     metoprolol succinate ER (TOPROL-XL) 25 MG 24 hr tablet Take 1 tablet (25 mg) by mouth daily 90 tablet 0     Multiple Vitamin (MULTIVITAMINS PO) Take 1 tablet by mouth daily       nitroGLYcerin (NITROSTAT) 0.4 MG sublingual tablet Place 1 tablet (0.4 mg) under the tongue every 5 minutes as needed for chest pain 25 tablet 1     Throat Lozenges (COUGH DROPS MT) Take by mouth as needed        azithromycin (ZITHROMAX) 250 MG tablet 2 tabs first day, then 1 tab by mouth daily for 4 additional days (Patient not taking: Reported on 2018) 6 tablet 0      Pseudoeph-Doxylamine-DM-APAP (NYQUIL PO)        No facility-administered encounter medications on file as of 12/5/2019.        Again, thank you for allowing me to participate in the care of your patient.      Sincerely,    Saniya Bustillo,      [facility]

## 2019-12-05 NOTE — PROGRESS NOTES
Service Date: 12/05/2019      REFERRING PHYSICIAN:  Dr. Tommie King.      HISTORY OF PRESENT ILLNESS:  Mr. Barnett is a very pleasant 64-year-old gentleman with a history of multivessel coronary disease.  He has had multiple revascularizations of his left anterior descending artery at the bifurcation of a diagonal branch.  He has had issues with in-stent restenosis and had to have percutaneous coronary intervention, most recently in 2013.  Since then, he has been fairly stable and asymptomatic.  He is returning for an annual followup visit.  I have asked him to undergo stress echo every year to look for restenosis because this has been such a problematic area at the bifurcation of the diagonal and LAD.  He did not really have typical symptoms of classic angina when he presented.  His stress test did show some EKG abnormalities which has been chronic since his revascularization, but his echocardiogram this year is again normal.  He had a high cardiac workload without any symptoms.  He has been doing well on his medications.  It looks like he had cholesterol profile here in November and he continues to have a mixed hyperlipidemia with total cholesterol of 149, HDL 39, LDL 72 and triglycerides 188.  He is on Vytorin for his cholesterol.      PHYSICAL EXAMINATION:   VITAL SIGNS:  On exam, his blood pressure is normotensive 114/78, pulse is 72, weight 160 with a body mass index of 25.   NECK:  Carotid upstrokes are brisk without bruit.   CARDIOVASCULAR:  Tones are regular without murmur, gallop or rub.   LUNGS:  Clear posteriorly.   EXTREMITIES:  He has no peripheral edema.      SUMMARY:  Mr. Barnett is a very pleasant 64-year-old male with a history of multivessel coronary disease, bifurcational in-stent restenosis with revascularization last in 2013.  He is stable from a cardiac perspective and remains asymptomatic.  His stress echocardiogram is normal this year.  I will recommend to continue to follow up on an annual  basis and monitor for progression of ischemia.  Please feel free to contact me with any questions you have in regards to his care.      cc:   [place] [address]         JS RODRIGUEZ DO             D: 2019   T: 2019   MT: HANNA      Name:     STEPHANE ROGERS   MRN:      [id number]        Account:      [id number]   :      1955           Service Date: 2019      Document: [id number]

## 2019-12-05 NOTE — PROGRESS NOTES
HPI and Plan:   See dictation    No orders of the defined types were placed in this encounter.      No orders of the defined types were placed in this encounter.      There are no discontinued medications.      Encounter Diagnoses   Name Primary?     Coronary artery disease involving native coronary artery of native heart without angina pectoris Yes     Pure hypercholesterolemia      Benign essential hypertension      Hypertension goal BP (blood pressure) < 130/80        CURRENT MEDICATIONS:  Current Outpatient Medications   Medication Sig Dispense Refill     albuterol (PROAIR HFA/PROVENTIL HFA/VENTOLIN HFA) 108 (90 BASE) MCG/ACT Inhaler Inhale 2 puffs into the lungs every 6 hours as needed for shortness of breath / dyspnea or wheezing 1 Inhaler 1     ascorbic acid (VITAMIN C) 500 MG tablet Take by mouth daily       ASPIRIN 81 MG OR TABS ONE DAILY 100 3     clopidogrel (PLAVIX) 75 MG tablet ONE DAILY 90 tablet 0     ezetimibe-simvastatin (VYTORIN) 10-40 MG tablet Take 1 tablet by mouth At Bedtime 90 tablet 0     lisinopril (PRINIVIL/ZESTRIL) 5 MG tablet Take 1 tablet (5 mg) by mouth daily 90 tablet 0     metoprolol succinate ER (TOPROL-XL) 25 MG 24 hr tablet Take 1 tablet (25 mg) by mouth daily 90 tablet 0     Multiple Vitamin (MULTIVITAMINS PO) Take 1 tablet by mouth daily       nitroGLYcerin (NITROSTAT) 0.4 MG sublingual tablet Place 1 tablet (0.4 mg) under the tongue every 5 minutes as needed for chest pain 25 tablet 1     Throat Lozenges (COUGH DROPS MT) Take by mouth as needed        azithromycin (ZITHROMAX) 250 MG tablet 2 tabs first day, then 1 tab by mouth daily for 4 additional days (Patient not taking: Reported on 11/2/2018) 6 tablet 0     Pseudoeph-Doxylamine-DM-APAP (NYQUIL PO)          ALLERGIES   No Known Allergies    PAST MEDICAL HISTORY:  Past Medical History:   Diagnosis Date     Antiplatelet or antithrombotic long-term use      Coronary artery disease 2007    multivessel stenting - JULIETA mid LAD x2  and JULIETA 1st diagonal,  complete obstruction 1st diagonal with successful thrombectomy and JULIETA,  in stent restenosis mid LAD (25%), prox to LAD stent had in segment restenosis of 50%, 50-70% in stent restenosis diagonal     Hypertension      Presbyopia      Pure hypercholesterolemia      Stented coronary artery      Unspecified cardiovascular disease ,        PAST SURGICAL HISTORY:  Past Surgical History:   Procedure Laterality Date     HEART CATH, ANGIOPLASTY      multivessel stenting - JULIETA mid LAD x2 and JULIETA 1st diagonal,  complete obstruction 1st diagonal with successful thrombectomy and JULIETA2013 in stent restenosis mid LAD (25%), prox to LAD stent had in segment restenosis of 50%, 50-70% in stent restenosis diagonal     HERNIORRHAPHY INGUINAL  2012    Procedure: HERNIORRHAPHY INGUINAL;  left inguinal hernia repair with Mesh ;  Surgeon: Cam Lancaster MD;  Location: [facility] OR     SURGICAL HISTORY OF -       s/p vasectomy      SURGICAL HISTORY OF -   , , 2/10    stent x 2 - LAD/Diagonal - Dr Gastelum - stent  - mid LAD     SURGICAL HISTORY OF -   10/10    stress echo normal       FAMILY HISTORY:  Family History   Problem Relation Age of Onset     Alzheimer Disease Mother      Heart Disease Father         MI     C.A.D. Brother 48        Stents     Family History Negative Other        SOCIAL HISTORY:  Social History     Socioeconomic History     Marital status:      Spouse name: None     Number of children: None     Years of education: None     Highest education level: None   Occupational History     None   Social Needs     Financial resource strain: None     Food insecurity:     Worry: None     Inability: None     Transportation needs:     Medical: None     Non-medical: None   Tobacco Use     Smoking status: Former Smoker     Packs/day: 1.00     Years: 30.00     Pack years: 30.00     Start date:      Last attempt to quit: 2001     Years since quittin.6  "    Smokeless tobacco: Never Used     Tobacco comment: quit 4/2001   Substance and Sexual Activity     Alcohol use: No     Alcohol/week: 0.0 standard drinks     Drug use: No     Sexual activity: Yes   Lifestyle     Physical activity:     Days per week: None     Minutes per session: None     Stress: None   Relationships     Social connections:     Talks on phone: None     Gets together: None     Attends Scientology service: None     Active member of club or organization: None     Attends meetings of clubs or organizations: None     Relationship status: None     Intimate partner violence:     Fear of current or ex partner: None     Emotionally abused: None     Physically abused: None     Forced sexual activity: None   Other Topics Concern     Parent/sibling w/ CABG, MI or angioplasty before 65F 55M? Yes      Service Not Asked     Blood Transfusions Not Asked     Caffeine Concern Yes     Comment: 1 soda a day     Occupational Exposure Not Asked     Hobby Hazards Not Asked     Sleep Concern No     Comment: does not sleep well some nights     Stress Concern No     Weight Concern Not Asked     Special Diet No     Back Care Not Asked     Exercise Yes     Comment: Walking 2 days per week, working PT     Bike Helmet Not Asked     Seat Belt Yes     Self-Exams Not Asked   Social History Narrative     None       Review of Systems:  Skin:  Negative       Eyes:  Positive for glasses    ENT:  Negative      Respiratory:  Negative       Cardiovascular:  Negative      Gastroenterology: Negative      Genitourinary:  Positive for urinary frequency    Musculoskeletal:  Positive for back pain always has a sore back - per pt  Neurologic:  Negative      Psychiatric:  Negative      Heme/Lymph/Imm:  Negative      Endocrine:  Negative        Physical Exam:  Vitals: /78 (BP Location: Right arm, Patient Position: Chair, Cuff Size: Adult Regular)   Pulse 72   Ht 1.676 m (5' 6\")   Wt 72.6 kg (160 lb 1.6 oz)   BMI 25.84 kg/m  "     Constitutional:  cooperative, alert and oriented, well developed, well nourished, in no acute distress        Skin:  warm and dry to the touch          Head:  normocephalic, no masses or lesions        Eyes:  pupils equal and round        Lymph:      ENT:  no pallor or cyanosis, dentition good        Neck:  carotid pulses are full and equal bilaterally        Respiratory:  clear to auscultation;normal symmetry         Cardiac: regular rhythm;no murmurs, gallops or rubs detected                pulses full and equal, no bruits auscultated                                        GI:  abdomen soft;no bruits        Extremities and Muscular Skeletal:  no deformities, clubbing, cyanosis, erythema observed              Neurological:  no gross motor deficits        Psych:  Alert and Oriented x 3          CC  Tommie King MD  [address] [place] [address]

## 2019-12-05 NOTE — LETTER
12/5/2019    Tommie King MD  [address] [facility] [address]    RE: Luis Miguel Barnett       Dear Colleague,    I had the pleasure of seeing Luis Miguel Barnett in the [facility].    HPI and Plan:   See dictation    No orders of the defined types were placed in this encounter.      No orders of the defined types were placed in this encounter.      There are no discontinued medications.      Encounter Diagnoses   Name Primary?     Coronary artery disease involving native coronary artery of native heart without angina pectoris Yes     Pure hypercholesterolemia      Benign essential hypertension      Hypertension goal BP (blood pressure) < 130/80        CURRENT MEDICATIONS:  Current Outpatient Medications   Medication Sig Dispense Refill     albuterol (PROAIR HFA/PROVENTIL HFA/VENTOLIN HFA) 108 (90 BASE) MCG/ACT Inhaler Inhale 2 puffs into the lungs every 6 hours as needed for shortness of breath / dyspnea or wheezing 1 Inhaler 1     ascorbic acid (VITAMIN C) 500 MG tablet Take by mouth daily       ASPIRIN 81 MG OR TABS ONE DAILY 100 3     clopidogrel (PLAVIX) 75 MG tablet ONE DAILY 90 tablet 0     ezetimibe-simvastatin (VYTORIN) 10-40 MG tablet Take 1 tablet by mouth At Bedtime 90 tablet 0     lisinopril (PRINIVIL/ZESTRIL) 5 MG tablet Take 1 tablet (5 mg) by mouth daily 90 tablet 0     metoprolol succinate ER (TOPROL-XL) 25 MG 24 hr tablet Take 1 tablet (25 mg) by mouth daily 90 tablet 0     Multiple Vitamin (MULTIVITAMINS PO) Take 1 tablet by mouth daily       nitroGLYcerin (NITROSTAT) 0.4 MG sublingual tablet Place 1 tablet (0.4 mg) under the tongue every 5 minutes as needed for chest pain 25 tablet 1     Throat Lozenges (COUGH DROPS MT) Take by mouth as needed        azithromycin (ZITHROMAX) 250 MG tablet 2 tabs first day, then 1 tab by mouth daily for 4 additional days (Patient not taking: Reported on 11/2/2018) 6 tablet 0     Pseudoeph-Doxylamine-DM-APAP (NYQUIL PO)           ALLERGIES   No Known Allergies    PAST MEDICAL HISTORY:  Past Medical History:   Diagnosis Date     Antiplatelet or antithrombotic long-term use      Coronary artery disease 2007    multivessel stenting - JULIETA mid LAD x2 and JULIETA 1st diagonal, 2008 complete obstruction 1st diagonal with successful thrombectomy and JULIETA, 2013 in stent restenosis mid LAD (25%), prox to LAD stent had in segment restenosis of 50%, 50-70% in stent restenosis diagonal     Hypertension      Presbyopia      Pure hypercholesterolemia      Stented coronary artery      Unspecified cardiovascular disease 6/07, 11/08       PAST SURGICAL HISTORY:  Past Surgical History:   Procedure Laterality Date     HEART CATH, ANGIOPLASTY      multivessel stenting - JULIETA mid LAD x2 and JULIETA 1st diagonal, 2008 complete obstruction 1st diagonal with successful thrombectomy and JULIETA, 2013 in stent restenosis mid LAD (25%), prox to LAD stent had in segment restenosis of 50%, 50-70% in stent restenosis diagonal     HERNIORRHAPHY INGUINAL  11/16/2012    Procedure: HERNIORRHAPHY INGUINAL;  left inguinal hernia repair with Mesh ;  Surgeon: Cam Lancaster MD;  Location: [facility] OR     SURGICAL HISTORY OF -   1994    s/p vasectomy      SURGICAL HISTORY OF -   6/07, 11/08, 2/10    stent x 2 - LAD/Diagonal - Dr Gastelum - stent 11/08 - mid LAD     SURGICAL HISTORY OF -   10/10    stress echo normal       FAMILY HISTORY:  Family History   Problem Relation Age of Onset     Alzheimer Disease Mother      Heart Disease Father         MI     C.A.RIK. Brother 48        Stents     Family History Negative Other        SOCIAL HISTORY:  Social History     Socioeconomic History     Marital status:      Spouse name: None     Number of children: None     Years of education: None     Highest education level: None   Occupational History     None   Social Needs     Financial resource strain: None     Food insecurity:     Worry: None     Inability: None     Transportation needs:      Medical: None     Non-medical: None   Tobacco Use     Smoking status: Former Smoker     Packs/day: 1.00     Years: 30.00     Pack years: 30.00     Start date:      Last attempt to quit: 2001     Years since quittin.6     Smokeless tobacco: Never Used     Tobacco comment: quit 2001   Substance and Sexual Activity     Alcohol use: No     Alcohol/week: 0.0 standard drinks     Drug use: No     Sexual activity: Yes   Lifestyle     Physical activity:     Days per week: None     Minutes per session: None     Stress: None   Relationships     Social connections:     Talks on phone: None     Gets together: None     Attends Tenriism service: None     Active member of club or organization: None     Attends meetings of clubs or organizations: None     Relationship status: None     Intimate partner violence:     Fear of current or ex partner: None     Emotionally abused: None     Physically abused: None     Forced sexual activity: None   Other Topics Concern     Parent/sibling w/ CABG, MI or angioplasty before 65F 55M? Yes      Service Not Asked     Blood Transfusions Not Asked     Caffeine Concern Yes     Comment: 1 soda a day     Occupational Exposure Not Asked     Hobby Hazards Not Asked     Sleep Concern No     Comment: does not sleep well some nights     Stress Concern No     Weight Concern Not Asked     Special Diet No     Back Care Not Asked     Exercise Yes     Comment: Walking 2 days per week, working PT     Bike Helmet Not Asked     Seat Belt Yes     Self-Exams Not Asked   Social History Narrative     None       Review of Systems:  Skin:  Negative       Eyes:  Positive for glasses    ENT:  Negative      Respiratory:  Negative       Cardiovascular:  Negative      Gastroenterology: Negative      Genitourinary:  Positive for urinary frequency    Musculoskeletal:  Positive for back pain always has a sore back - per pt  Neurologic:  Negative      Psychiatric:  Negative      Heme/Lymph/Imm:  Negative    "   Endocrine:  Negative        Physical Exam:  Vitals: /78 (BP Location: Right arm, Patient Position: Chair, Cuff Size: Adult Regular)   Pulse 72   Ht 1.676 m (5' 6\")   Wt 72.6 kg (160 lb 1.6 oz)   BMI 25.84 kg/m       Constitutional:  cooperative, alert and oriented, well developed, well nourished, in no acute distress        Skin:  warm and dry to the touch          Head:  normocephalic, no masses or lesions        Eyes:  pupils equal and round        Lymph:      ENT:  no pallor or cyanosis, dentition good        Neck:  carotid pulses are full and equal bilaterally        Respiratory:  clear to auscultation;normal symmetry         Cardiac: regular rhythm;no murmurs, gallops or rubs detected                pulses full and equal, no bruits auscultated                                        GI:  abdomen soft;no bruits        Extremities and Muscular Skeletal:  no deformities, clubbing, cyanosis, erythema observed              Neurological:  no gross motor deficits        Psych:  Alert and Oriented x 3          CC  Tommie King MD  [address] [place] [address]                    Thank you for allowing me to participate in the care of your patient.      Sincerely,     Saniya Bustillo,      [facility]    cc:   Tommie King MD  [address] [place] [address]        "

## 2020-02-25 DIAGNOSIS — E78.00 PURE HYPERCHOLESTEROLEMIA: ICD-10-CM

## 2020-02-25 DIAGNOSIS — I10 BENIGN ESSENTIAL HYPERTENSION: ICD-10-CM

## 2020-02-25 DIAGNOSIS — I25.10 CORONARY ARTERY DISEASE INVOLVING NATIVE CORONARY ARTERY OF NATIVE HEART WITHOUT ANGINA PECTORIS: ICD-10-CM

## 2020-02-25 RX ORDER — EZETIMIBE AND SIMVASTATIN 10; 40 MG/1; MG/1
1 TABLET ORAL AT BEDTIME
Qty: 90 TABLET | Refills: 3 | Status: SHIPPED | OUTPATIENT
Start: 2020-02-25 | End: 2021-01-26

## 2020-02-25 RX ORDER — CLOPIDOGREL BISULFATE 75 MG/1
TABLET ORAL
Qty: 90 TABLET | Refills: 3 | Status: SHIPPED | OUTPATIENT
Start: 2020-02-25 | End: 2021-01-26

## 2020-02-25 RX ORDER — METOPROLOL SUCCINATE 25 MG/1
25 TABLET, EXTENDED RELEASE ORAL DAILY
Qty: 90 TABLET | Refills: 3 | Status: SHIPPED | OUTPATIENT
Start: 2020-02-25 | End: 2021-01-26

## 2020-02-25 RX ORDER — LISINOPRIL 5 MG/1
5 TABLET ORAL DAILY
Qty: 90 TABLET | Refills: 3 | Status: SHIPPED | OUTPATIENT
Start: 2020-02-25 | End: 2021-01-26

## 2020-09-09 DIAGNOSIS — I25.10 CORONARY ARTERY DISEASE INVOLVING NATIVE CORONARY ARTERY OF NATIVE HEART WITHOUT ANGINA PECTORIS: ICD-10-CM

## 2020-09-09 RX ORDER — NITROGLYCERIN 0.4 MG/1
0.4 TABLET SUBLINGUAL EVERY 5 MIN PRN
Qty: 25 TABLET | Refills: 1 | Status: SHIPPED | OUTPATIENT
Start: 2020-09-09 | End: 2023-08-17

## 2020-12-03 ENCOUNTER — HOSPITAL ENCOUNTER (OUTPATIENT)
Dept: CARDIOLOGY | Facility: CLINIC | Age: 65
Discharge: HOME | End: 2020-12-03
Attending: INTERNAL MEDICINE | Admitting: INTERNAL MEDICINE
Payer: COMMERCIAL

## 2020-12-03 DIAGNOSIS — E78.00 PURE HYPERCHOLESTEROLEMIA: ICD-10-CM

## 2020-12-03 DIAGNOSIS — I10 BENIGN ESSENTIAL HYPERTENSION: ICD-10-CM

## 2020-12-03 DIAGNOSIS — E78.5 HYPERLIPIDEMIA LDL GOAL <70: ICD-10-CM

## 2020-12-03 DIAGNOSIS — I10 HYPERTENSION GOAL BP (BLOOD PRESSURE) < 130/80: ICD-10-CM

## 2020-12-03 DIAGNOSIS — I25.10 CORONARY ARTERY DISEASE INVOLVING NATIVE CORONARY ARTERY OF NATIVE HEART WITHOUT ANGINA PECTORIS: Primary | ICD-10-CM

## 2020-12-03 DIAGNOSIS — I25.10 CORONARY ARTERY DISEASE INVOLVING NATIVE CORONARY ARTERY OF NATIVE HEART WITHOUT ANGINA PECTORIS: ICD-10-CM

## 2020-12-03 PROCEDURE — 93017 CV STRESS TEST TRACING ONLY: CPT

## 2020-12-03 PROCEDURE — 93325 DOPPLER ECHO COLOR FLOW MAPG: CPT | Mod: 26 | Performed by: INTERNAL MEDICINE

## 2020-12-03 PROCEDURE — 93321 DOPPLER ECHO F-UP/LMTD STD: CPT | Mod: 26 | Performed by: INTERNAL MEDICINE

## 2020-12-03 PROCEDURE — 93016 CV STRESS TEST SUPVJ ONLY: CPT | Performed by: INTERNAL MEDICINE

## 2020-12-03 PROCEDURE — 93350 STRESS TTE ONLY: CPT | Mod: 26 | Performed by: INTERNAL MEDICINE

## 2020-12-03 PROCEDURE — 93018 CV STRESS TEST I&R ONLY: CPT | Performed by: INTERNAL MEDICINE

## 2020-12-10 ENCOUNTER — OFFICE VISIT (OUTPATIENT)
Dept: CARDIOLOGY | Facility: CLINIC | Age: 65
End: 2020-12-10
Payer: COMMERCIAL

## 2020-12-10 VITALS
OXYGEN SATURATION: 97 % | HEART RATE: 68 BPM | SYSTOLIC BLOOD PRESSURE: 130 MMHG | DIASTOLIC BLOOD PRESSURE: 89 MMHG | WEIGHT: 158 LBS | HEIGHT: 66 IN | BODY MASS INDEX: 25.39 KG/M2

## 2020-12-10 DIAGNOSIS — I25.10 CORONARY ARTERY DISEASE INVOLVING NATIVE CORONARY ARTERY OF NATIVE HEART WITHOUT ANGINA PECTORIS: ICD-10-CM

## 2020-12-10 DIAGNOSIS — I10 HYPERTENSION GOAL BP (BLOOD PRESSURE) < 130/80: ICD-10-CM

## 2020-12-10 DIAGNOSIS — E78.5 HYPERLIPIDEMIA LDL GOAL <70: ICD-10-CM

## 2020-12-10 DIAGNOSIS — I25.10 CORONARY ARTERY DISEASE INVOLVING NATIVE CORONARY ARTERY OF NATIVE HEART WITHOUT ANGINA PECTORIS: Primary | ICD-10-CM

## 2020-12-10 LAB
ALT SERPL W P-5'-P-CCNC: 52 U/L (ref 0–70)
ANION GAP SERPL CALCULATED.3IONS-SCNC: 5 MMOL/L (ref 3–14)
BUN SERPL-MCNC: 17 MG/DL (ref 7–30)
CALCIUM SERPL-MCNC: 9.4 MG/DL (ref 8.5–10.1)
CHLORIDE SERPL-SCNC: 104 MMOL/L (ref 94–109)
CHOLEST SERPL-MCNC: 152 MG/DL
CO2 SERPL-SCNC: 31 MMOL/L (ref 20–32)
CREAT SERPL-MCNC: 0.74 MG/DL (ref 0.66–1.25)
GFR SERPL CREATININE-BSD FRML MDRD: >90 ML/MIN/{1.73_M2}
GLUCOSE SERPL-MCNC: 108 MG/DL (ref 70–99)
HDLC SERPL-MCNC: 41 MG/DL
LDLC SERPL CALC-MCNC: 68 MG/DL
NONHDLC SERPL-MCNC: 111 MG/DL
POTASSIUM SERPL-SCNC: 4.4 MMOL/L (ref 3.4–5.3)
SODIUM SERPL-SCNC: 140 MMOL/L (ref 133–144)
TRIGL SERPL-MCNC: 217 MG/DL

## 2020-12-10 PROCEDURE — 99214 OFFICE O/P EST MOD 30 MIN: CPT | Performed by: INTERNAL MEDICINE

## 2020-12-10 PROCEDURE — 80061 LIPID PANEL: CPT | Performed by: INTERNAL MEDICINE

## 2020-12-10 PROCEDURE — 84460 ALANINE AMINO (ALT) (SGPT): CPT | Performed by: INTERNAL MEDICINE

## 2020-12-10 PROCEDURE — 80048 BASIC METABOLIC PNL TOTAL CA: CPT | Performed by: INTERNAL MEDICINE

## 2020-12-10 PROCEDURE — 36415 COLL VENOUS BLD VENIPUNCTURE: CPT | Performed by: INTERNAL MEDICINE

## 2020-12-10 ASSESSMENT — MIFFLIN-ST. JEOR: SCORE: 1449.43

## 2020-12-10 NOTE — LETTER
12/10/2020    Tommie King MD  [address] [facility] [address]    RE: Luis Miguel Barnett       Dear Colleague,    I had the pleasure of seeing Luis Miguel Barnett in the [facility].    HPI and Plan:   See dictation    No orders of the defined types were placed in this encounter.      Orders Placed This Encounter   Medications     UNABLE TO FIND     Sig: Pure ZZZ       Medications Discontinued During This Encounter   Medication Reason     albuterol (PROAIR HFA/PROVENTIL HFA/VENTOLIN HFA) 108 (90 BASE) MCG/ACT Inhaler Not filled/taken by Patient     azithromycin (ZITHROMAX) 250 MG tablet Therapy completed         No diagnosis found.    CURRENT MEDICATIONS:  Current Outpatient Medications   Medication Sig Dispense Refill     ascorbic acid (VITAMIN C) 500 MG tablet Take by mouth daily       ASPIRIN 81 MG OR TABS ONE DAILY 100 3     clopidogrel (PLAVIX) 75 MG tablet Take one tablet (75 mg) by mouth daily 90 tablet 3     ezetimibe-simvastatin (VYTORIN) 10-40 MG tablet Take 1 tablet by mouth At Bedtime 90 tablet 3     lisinopril (ZESTRIL) 5 MG tablet Take 1 tablet (5 mg) by mouth daily 90 tablet 3     metoprolol succinate ER (TOPROL-XL) 25 MG 24 hr tablet Take 1 tablet (25 mg) by mouth daily 90 tablet 3     Multiple Vitamin (MULTIVITAMINS PO) Take 1 tablet by mouth daily       nitroGLYcerin (NITROSTAT) 0.4 MG sublingual tablet Place 1 tablet (0.4 mg) under the tongue every 5 minutes as needed for chest pain 25 tablet 1     Pseudoeph-Doxylamine-DM-APAP (NYQUIL PO)        Throat Lozenges (COUGH DROPS MT) Take by mouth as needed        UNABLE TO FIND Pure ZZZ         ALLERGIES   No Known Allergies    PAST MEDICAL HISTORY:  Past Medical History:   Diagnosis Date     Antiplatelet or antithrombotic long-term use      Coronary artery disease 2007    multivessel stenting - JULIETA mid LAD x2 and JULIETA 1st diagonal, 2008 complete obstruction 1st diagonal with successful thrombectomy and JULIETA, 2013 in  stent restenosis mid LAD (25%), prox to LAD stent had in segment restenosis of 50%, 50-70% in stent restenosis diagonal     Hypertension      Presbyopia      Pure hypercholesterolemia      Stented coronary artery      Unspecified cardiovascular disease ,        PAST SURGICAL HISTORY:  Past Surgical History:   Procedure Laterality Date     HEART CATH, ANGIOPLASTY      multivessel stenting - JULIETA mid LAD x2 and JULIETA 1st diagonal,  complete obstruction 1st diagonal with successful thrombectomy and JULIETA,  in stent restenosis mid LAD (25%), prox to LAD stent had in segment restenosis of 50%, 50-70% in stent restenosis diagonal     HERNIORRHAPHY INGUINAL  2012    Procedure: HERNIORRHAPHY INGUINAL;  left inguinal hernia repair with Mesh ;  Surgeon: Cam Lancaster MD;  Location: [facility] OR     SURGICAL HISTORY OF -       s/p vasectomy      SURGICAL HISTORY OF -   , , 2/10    stent x 2 - LAD/Diagonal - Dr Gastelum - stent  - mid LAD     SURGICAL HISTORY OF -   10/10    stress echo normal       FAMILY HISTORY:  Family History   Problem Relation Age of Onset     Alzheimer Disease Mother      Heart Disease Father         MI     C.A.D. Brother 48        Stents     Family History Negative Other        SOCIAL HISTORY:  Social History     Socioeconomic History     Marital status:      Spouse name: Not on file     Number of children: Not on file     Years of education: Not on file     Highest education level: Not on file   Occupational History     Not on file   Social Needs     Financial resource strain: Not on file     Food insecurity     Worry: Not on file     Inability: Not on file     Transportation needs     Medical: Not on file     Non-medical: Not on file   Tobacco Use     Smoking status: Former Smoker     Packs/day: 1.00     Years: 30.00     Pack years: 30.00     Start date:      Quit date: 2001     Years since quittin.7     Smokeless tobacco: Never Used     Tobacco  "comment: quit 4/2001   Substance and Sexual Activity     Alcohol use: No     Alcohol/week: 0.0 standard drinks     Drug use: No     Sexual activity: Yes   Lifestyle     Physical activity     Days per week: Not on file     Minutes per session: Not on file     Stress: Not on file   Relationships     Social connections     Talks on phone: Not on file     Gets together: Not on file     Attends Rastafari service: Not on file     Active member of club or organization: Not on file     Attends meetings of clubs or organizations: Not on file     Relationship status: Not on file     Intimate partner violence     Fear of current or ex partner: Not on file     Emotionally abused: Not on file     Physically abused: Not on file     Forced sexual activity: Not on file   Other Topics Concern     Parent/sibling w/ CABG, MI or angioplasty before 65F 55M? Yes      Service Not Asked     Blood Transfusions Not Asked     Caffeine Concern Yes     Comment: 1 soda a day     Occupational Exposure Not Asked     Hobby Hazards Not Asked     Sleep Concern No     Comment: does not sleep well some nights     Stress Concern No     Weight Concern Not Asked     Special Diet No     Back Care Not Asked     Exercise Yes     Comment: Walking 2 days per week, working PT     Bike Helmet Not Asked     Seat Belt Yes     Self-Exams Not Asked   Social History Narrative     Not on file       Review of Systems:  Skin:  Negative       Eyes:  Positive for glasses    ENT:  Positive for vertigo Hx vertigo  Respiratory:  Negative       Cardiovascular:  Negative      Gastroenterology: Negative      Genitourinary:  Negative      Musculoskeletal:  Positive for back pain always has a sore back - per pt  Neurologic:  Negative      Psychiatric:  Negative      Heme/Lymph/Imm:  Negative      Endocrine:  Negative        Physical Exam:  Vitals: /89 (BP Location: Right arm, Patient Position: Sitting, Cuff Size: Adult Regular)   Pulse 68   Ht 1.676 m (5' 6\")   " Wt 71.7 kg (158 lb)   SpO2 97%   BMI 25.50 kg/m      Constitutional:  cooperative, alert and oriented, well developed, well nourished, in no acute distress        Skin:  warm and dry to the touch          Head:  normocephalic, no masses or lesions        Eyes:  pupils equal and round        Lymph:      ENT:  no pallor or cyanosis, dentition good        Neck:  carotid pulses are full and equal bilaterally        Respiratory:  clear to auscultation;normal symmetry         Cardiac: regular rhythm;no murmurs, gallops or rubs detected                pulses full and equal, no bruits auscultated                                        GI:  abdomen soft;no bruits        Extremities and Muscular Skeletal:  no deformities, clubbing, cyanosis, erythema observed              Neurological:  no gross motor deficits        Psych:  Alert and Oriented x 3          CC  No referring provider defined for this encounter.                      Thank you for allowing me to participate in the care of your patient.      Sincerely,     Saniya Bustillo,      [facility]    cc:   No referring provider defined for this encounter.

## 2020-12-10 NOTE — LETTER
12/10/2020      Tommie King MD  [address] [facility] [address]      RE: Luis Miguel Barnett       Dear Colleague,    I had the pleasure of seeing Luis Miguel Barnett in the [facility].    Service Date: 12/10/2020      CLINIC FOLLOWUP VISIT       REFERRING PHYSICIAN:  Tommie King MD      HISTORY OF PRESENT ILLNESS:  Mr. Barnett is a pleasant, 65-year-old male with a history of coronary artery disease.  He has had multivessel stenting mainly to his LAD with some in-stent restenosis issues.  Initially, it was in 2008.  He had subsequent procedures in 2013.  In the last several years, he has been doing quite well.  We continue to follow him annually with a stress echo, which has been very useful in detecting in-stent restenosis for him.  He is pretty consistent with symptoms and abnormal stress testing.  He returns to clinic today having had a stress test about a week ago.  Unfortunately, in looking through the images, the image quality is very poor.  I specifically focus on that anterior septal, mid anteroseptal wall, which has been problematic in the past and is very poorly visualized on this current study.  However, he did perform 10 minutes 21 seconds on a standard Vipin protocol without symptoms.  He has consistently had ST segment changes; this is unchanged.  He is not having any concerns right now with chest pain or shortness of breath or exercise intolerance.  He feels very well.  His cholesterol profile looks slightly improved from his previous last year.  Total cholesterol is 152, HDL went up a couple points to 41, and LDL remains low at 68.  His triglycerides, however, are still elevated at 217.  I went over those tests results with him today.      PHYSICAL EXAMINATION:     VITAL SIGNS:  His blood pressure is 130/89.  Pulse is 68.  Weight is 158.  This has been stable over the last several years.   NECK:  Carotid upstrokes are brisk without bruit.   CARDIOVASCULAR:   Cardiovascular tones are regular without murmur, gallop or rub.   LUNGS:  Clear posteriorly.   EXTREMITIES:  He has strong and symmetric pulses in the upper and lower extremities without peripheral edema.      In summary, Mr. Rogers is a very pleasant, 65-year-old male with a history of coronary artery disease.  He has had multivessel stenting and multiple stents to the LAD and diagonal for in-stent restenosis.  However, over the last several years, he has been very stable without recurrent issues or need for revascularization.  He is asymptomatic.  Although his image quality of the stress echo this year is poor, he was able to do a high cardiac workload without symptoms.  We will continue his current medical management, and I will recommend a followup with him in a year.  Hopefully, we will get some better images next year on his stress testing.        Please feel free to contact me with any questions you have in regard to his care.      cc:   [place] [address]         JS RODRIGUEZ DO             D: 12/10/2020   T: 12/10/2020   MT: elvira      Name:     STEPHANE ROGERS   MRN:      [id number]        Account:      [id number]   :      1955           Service Date: 12/10/2020      Document: [id number]        Outpatient Encounter Medications as of 12/10/2020   Medication Sig Dispense Refill     ascorbic acid (VITAMIN C) 500 MG tablet Take by mouth daily       ASPIRIN 81 MG OR TABS ONE DAILY 100 3     clopidogrel (PLAVIX) 75 MG tablet Take one tablet (75 mg) by mouth daily 90 tablet 3     ezetimibe-simvastatin (VYTORIN) 10-40 MG tablet Take 1 tablet by mouth At Bedtime 90 tablet 3     lisinopril (ZESTRIL) 5 MG tablet Take 1 tablet (5 mg) by mouth daily 90 tablet 3     metoprolol succinate ER (TOPROL-XL) 25 MG 24 hr tablet Take 1 tablet (25 mg) by mouth daily 90 tablet 3     Multiple Vitamin (MULTIVITAMINS PO) Take 1 tablet by  mouth daily       nitroGLYcerin (NITROSTAT) 0.4 MG sublingual tablet Place 1 tablet (0.4 mg) under the tongue every 5 minutes as needed for chest pain 25 tablet 1     Pseudoeph-Doxylamine-DM-APAP (NYQUIL PO)        Throat Lozenges (COUGH DROPS MT) Take by mouth as needed        UNABLE TO FIND Pure ZZZ       [DISCONTINUED] albuterol (PROAIR HFA/PROVENTIL HFA/VENTOLIN HFA) 108 (90 BASE) MCG/ACT Inhaler Inhale 2 puffs into the lungs every 6 hours as needed for shortness of breath / dyspnea or wheezing 1 Inhaler 1     [DISCONTINUED] azithromycin (ZITHROMAX) 250 MG tablet 2 tabs first day, then 1 tab by mouth daily for 4 additional days (Patient not taking: Reported on 11/2/2018) 6 tablet 0     No facility-administered encounter medications on file as of 12/10/2020.        Again, thank you for allowing me to participate in the care of your patient.      Sincerely,    Saniya Bustillo, DO     [facility]

## 2020-12-10 NOTE — PROGRESS NOTES
HPI and Plan:   See dictation    No orders of the defined types were placed in this encounter.      Orders Placed This Encounter   Medications     UNABLE TO FIND     Sig: Pure ZZZ       Medications Discontinued During This Encounter   Medication Reason     albuterol (PROAIR HFA/PROVENTIL HFA/VENTOLIN HFA) 108 (90 BASE) MCG/ACT Inhaler Not filled/taken by Patient     azithromycin (ZITHROMAX) 250 MG tablet Therapy completed         No diagnosis found.    CURRENT MEDICATIONS:  Current Outpatient Medications   Medication Sig Dispense Refill     ascorbic acid (VITAMIN C) 500 MG tablet Take by mouth daily       ASPIRIN 81 MG OR TABS ONE DAILY 100 3     clopidogrel (PLAVIX) 75 MG tablet Take one tablet (75 mg) by mouth daily 90 tablet 3     ezetimibe-simvastatin (VYTORIN) 10-40 MG tablet Take 1 tablet by mouth At Bedtime 90 tablet 3     lisinopril (ZESTRIL) 5 MG tablet Take 1 tablet (5 mg) by mouth daily 90 tablet 3     metoprolol succinate ER (TOPROL-XL) 25 MG 24 hr tablet Take 1 tablet (25 mg) by mouth daily 90 tablet 3     Multiple Vitamin (MULTIVITAMINS PO) Take 1 tablet by mouth daily       nitroGLYcerin (NITROSTAT) 0.4 MG sublingual tablet Place 1 tablet (0.4 mg) under the tongue every 5 minutes as needed for chest pain 25 tablet 1     Pseudoeph-Doxylamine-DM-APAP (NYQUIL PO)        Throat Lozenges (COUGH DROPS MT) Take by mouth as needed        UNABLE TO FIND Pure ZZZ         ALLERGIES   No Known Allergies    PAST MEDICAL HISTORY:  Past Medical History:   Diagnosis Date     Antiplatelet or antithrombotic long-term use      Coronary artery disease 2007    multivessel stenting - JULIETA mid LAD x2 and JULIETA 1st diagonal, 2008 complete obstruction 1st diagonal with successful thrombectomy and JULIETA, 2013 in stent restenosis mid LAD (25%), prox to LAD stent had in segment restenosis of 50%, 50-70% in stent restenosis diagonal     Hypertension      Presbyopia      Pure hypercholesterolemia      Stented coronary artery       Unspecified cardiovascular disease ,        PAST SURGICAL HISTORY:  Past Surgical History:   Procedure Laterality Date     HEART CATH, ANGIOPLASTY      multivessel stenting - JULIETA mid LAD x2 and JULIETA 1st diagonal,  complete obstruction 1st diagonal with successful thrombectomy and JULIETA,  in stent restenosis mid LAD (25%), prox to LAD stent had in segment restenosis of 50%, 50-70% in stent restenosis diagonal     HERNIORRHAPHY INGUINAL  2012    Procedure: HERNIORRHAPHY INGUINAL;  left inguinal hernia repair with Mesh ;  Surgeon: Cam Lancaster MD;  Location: [facility] OR     SURGICAL HISTORY OF -       s/p vasectomy      SURGICAL HISTORY OF -   , , 2/10    stent x 2 - LAD/Diagonal - Dr Gastelum - stent  - mid LAD     SURGICAL HISTORY OF -   10/10    stress echo normal       FAMILY HISTORY:  Family History   Problem Relation Age of Onset     Alzheimer Disease Mother      Heart Disease Father         MI     C.A.D. Brother 48        Stents     Family History Negative Other        SOCIAL HISTORY:  Social History     Socioeconomic History     Marital status:      Spouse name: Not on file     Number of children: Not on file     Years of education: Not on file     Highest education level: Not on file   Occupational History     Not on file   Social Needs     Financial resource strain: Not on file     Food insecurity     Worry: Not on file     Inability: Not on file     Transportation needs     Medical: Not on file     Non-medical: Not on file   Tobacco Use     Smoking status: Former Smoker     Packs/day: 1.00     Years: 30.00     Pack years: 30.00     Start date:      Quit date: 2001     Years since quittin.7     Smokeless tobacco: Never Used     Tobacco comment: quit 2001   Substance and Sexual Activity     Alcohol use: No     Alcohol/week: 0.0 standard drinks     Drug use: No     Sexual activity: Yes   Lifestyle     Physical activity     Days per week: Not on file      "Minutes per session: Not on file     Stress: Not on file   Relationships     Social connections     Talks on phone: Not on file     Gets together: Not on file     Attends [employer] service: Not on file     Active member of club or organization: Not on file     Attends meetings of clubs or organizations: Not on file     Relationship status: Not on file     Intimate partner violence     Fear of current or ex partner: Not on file     Emotionally abused: Not on file     Physically abused: Not on file     Forced sexual activity: Not on file   Other Topics Concern     Parent/sibling w/ CABG, MI or angioplasty before 65F 55M? Yes      Service Not Asked     Blood Transfusions Not Asked     Caffeine Concern Yes     Comment: 1 soda a day     Occupational Exposure Not Asked     Hobby Hazards Not Asked     Sleep Concern No     Comment: does not sleep well some nights     Stress Concern No     Weight Concern Not Asked     Special Diet No     Back Care Not Asked     Exercise Yes     Comment: Walking 2 days per week, working PT     Bike Helmet Not Asked     Seat Belt Yes     Self-Exams Not Asked   Social History Narrative     Not on file       Review of Systems:  Skin:  Negative       Eyes:  Positive for glasses    ENT:  Positive for vertigo Hx vertigo  Respiratory:  Negative       Cardiovascular:  Negative      Gastroenterology: Negative      Genitourinary:  Negative      Musculoskeletal:  Positive for back pain always has a sore back - per pt  Neurologic:  Negative      Psychiatric:  Negative      Heme/Lymph/Imm:  Negative      Endocrine:  Negative        Physical Exam:  Vitals: /89 (BP Location: Right arm, Patient Position: Sitting, Cuff Size: Adult Regular)   Pulse 68   Ht 1.676 m (5' 6\")   Wt 71.7 kg (158 lb)   SpO2 97%   BMI 25.50 kg/m      Constitutional:  cooperative, alert and oriented, well developed, well nourished, in no acute distress        Skin:  warm and dry to the touch          Head:  " normocephalic, no masses or lesions        Eyes:  pupils equal and round        Lymph:      ENT:  no pallor or cyanosis, dentition good        Neck:  carotid pulses are full and equal bilaterally        Respiratory:  clear to auscultation;normal symmetry         Cardiac: regular rhythm;no murmurs, gallops or rubs detected                pulses full and equal, no bruits auscultated                                        GI:  abdomen soft;no bruits        Extremities and Muscular Skeletal:  no deformities, clubbing, cyanosis, erythema observed              Neurological:  no gross motor deficits        Psych:  Alert and Oriented x 3          CC  No referring provider defined for this encounter.

## 2020-12-10 NOTE — PROGRESS NOTES
Service Date: 12/10/2020      CLINIC FOLLOWUP VISIT       REFERRING PHYSICIAN:  Tommie King MD      HISTORY OF PRESENT ILLNESS:  Mr. Barnett is a pleasant, 65-year-old male with a history of coronary artery disease.  He has had multivessel stenting mainly to his LAD with some in-stent restenosis issues.  Initially, it was in 2008.  He had subsequent procedures in 2013.  In the last several years, he has been doing quite well.  We continue to follow him annually with a stress echo, which has been very useful in detecting in-stent restenosis for him.  He is pretty consistent with symptoms and abnormal stress testing.  He returns to clinic today having had a stress test about a week ago.  Unfortunately, in looking through the images, the image quality is very poor.  I specifically focus on that anterior septal, mid anteroseptal wall, which has been problematic in the past and is very poorly visualized on this current study.  However, he did perform 10 minutes 21 seconds on a standard Vipin protocol without symptoms.  He has consistently had ST segment changes; this is unchanged.  He is not having any concerns right now with chest pain or shortness of breath or exercise intolerance.  He feels very well.  His cholesterol profile looks slightly improved from his previous last year.  Total cholesterol is 152, HDL went up a couple points to 41, and LDL remains low at 68.  His triglycerides, however, are still elevated at 217.  I went over those tests results with him today.      PHYSICAL EXAMINATION:     VITAL SIGNS:  His blood pressure is 130/89.  Pulse is 68.  Weight is 158.  This has been stable over the last several years.   NECK:  Carotid upstrokes are brisk without bruit.   CARDIOVASCULAR:  Cardiovascular tones are regular without murmur, gallop or rub.   LUNGS:  Clear posteriorly.   EXTREMITIES:  He has strong and symmetric pulses in the upper and lower extremities without peripheral edema.      In summary,   Anibal is a very pleasant, 65-year-old male with a history of coronary artery disease.  He has had multivessel stenting and multiple stents to the LAD and diagonal for in-stent restenosis.  However, over the last several years, he has been very stable without recurrent issues or need for revascularization.  He is asymptomatic.  Although his image quality of the stress echo this year is poor, he was able to do a high cardiac workload without symptoms.  We will continue his current medical management, and I will recommend a followup with him in a year.  Hopefully, we will get some better images next year on his stress testing.        Please feel free to contact me with any questions you have in regard to his care.      cc:   [place] [address]         JS RODRIGUEZ DO             D: 12/10/2020   T: 12/10/2020   MT: elvira      Name:     STEPHANE ROGERS   MRN:      [id number]        Account:      [id number]   :      1955           Service Date: 12/10/2020      Document: [id number]

## 2021-01-26 DIAGNOSIS — E78.00 PURE HYPERCHOLESTEROLEMIA: ICD-10-CM

## 2021-01-26 DIAGNOSIS — I10 BENIGN ESSENTIAL HYPERTENSION: ICD-10-CM

## 2021-01-26 DIAGNOSIS — I25.10 CORONARY ARTERY DISEASE INVOLVING NATIVE CORONARY ARTERY OF NATIVE HEART WITHOUT ANGINA PECTORIS: ICD-10-CM

## 2021-01-26 RX ORDER — EZETIMIBE AND SIMVASTATIN 10; 40 MG/1; MG/1
1 TABLET ORAL AT BEDTIME
Qty: 90 TABLET | Refills: 3 | Status: SHIPPED | OUTPATIENT
Start: 2021-01-26 | End: 2021-12-27

## 2021-01-26 RX ORDER — METOPROLOL SUCCINATE 25 MG/1
25 TABLET, EXTENDED RELEASE ORAL DAILY
Qty: 90 TABLET | Refills: 3 | Status: SHIPPED | OUTPATIENT
Start: 2021-01-26 | End: 2021-12-27

## 2021-01-26 RX ORDER — CLOPIDOGREL BISULFATE 75 MG/1
TABLET ORAL
Qty: 90 TABLET | Refills: 3 | Status: SHIPPED | OUTPATIENT
Start: 2021-01-26 | End: 2021-12-27

## 2021-01-26 RX ORDER — LISINOPRIL 5 MG/1
5 TABLET ORAL DAILY
Qty: 90 TABLET | Refills: 3 | Status: SHIPPED | OUTPATIENT
Start: 2021-01-26 | End: 2021-12-27

## 2021-02-26 ENCOUNTER — TELEPHONE (OUTPATIENT)
Dept: FAMILY MEDICINE | Facility: CLINIC | Age: 66
End: 2021-02-26

## 2021-02-26 NOTE — TELEPHONE ENCOUNTER
Please let him know that [place] currently is vaccinating individuals 70 and older.  I know that is different than what the Department of Health or and/or governor is same but that is what [facility] is doing with their supply.  Message left on his voicemail    As vaccine supply increases and we are able to open appointments to more groups, we will share that information on our website [email or web address]. Check this website to stay up to date on COVID-19 vaccination information.

## 2021-02-26 NOTE — TELEPHONE ENCOUNTER
Pt calling as he saw that Governor [place] has made the announcement that phase 2 vaccines will be for 65 and over those with pre-existing conditions.  He has cardiology issue and has had 5 stents placed, so called his cardiologist to see if they will write him a letter for pre-existing conditions so he can sign up to get a shot when available.  He was told by the cardiology team that they are deferring this to PCPs for all of their patients.  Asking if we can do a pre-existing conditions letter for him.  He wants to be called when done if this is possible and he will then decide if he wants us to mail it or if he wants to  in clinic.  Please advise.  Amarilys Zuleta

## 2021-03-09 ENCOUNTER — VIRTUAL VISIT (OUTPATIENT)
Dept: FAMILY MEDICINE | Facility: CLINIC | Age: 66
End: 2021-03-09
Payer: COMMERCIAL

## 2021-03-09 DIAGNOSIS — R13.10 DYSPHAGIA, UNSPECIFIED TYPE: Primary | ICD-10-CM

## 2021-03-09 DIAGNOSIS — I25.10 CORONARY ARTERY DISEASE INVOLVING NATIVE CORONARY ARTERY OF NATIVE HEART WITHOUT ANGINA PECTORIS: ICD-10-CM

## 2021-03-09 PROCEDURE — 99213 OFFICE O/P EST LOW 20 MIN: CPT | Mod: 95 | Performed by: FAMILY MEDICINE

## 2021-03-09 NOTE — PROGRESS NOTES
"Maxwell is a 65 year old who is being evaluated via a billable telephone visit.      What phone number would you like to be contacted at? [phone]  How would you like to obtain your AVS? MyChart    Assessment & Plan   Dysphagia, unspecified type  Recurrent issues with chicken or pork getting stuck in patient with prior esophageal stricture and dilatation around 2012 (last).  Will refer to gastroenterology  - GASTROENTEROLOGY ADULT REF PROCEDURE ONLY    Coronary artery disease involving native coronary artery of native heart without angina pectoris  Stable, continue meds.  Should hold aspirin 7 days prior to EGD and Plavix 5 days prior.      BMI:   Estimated body mass index is 25.5 kg/m  as calculated from the following:    Height as of 12/10/20: 1.676 m (5' 6\").    Weight as of 12/10/20: 71.7 kg (158 lb).         Return in about 6 months (around 9/9/2021) for wellness exam with fasting labs, in person, with Dr Partha King.      Partha King MD      [address] [address]  [facility]   Office: [phone]       Subjective   Maxwell is a 65 year old who presents for the following health issues     HPI   Referral:   Pt would like to discuss getting a referral to [facility] for an EGD to be preformed. Pt reports he has been having difficulty swallowing food for  3-4 weeks. This has happened twice within the past 2 months and a half. - noted it with chicken and pork lately.  He has had this issue in the past and last had an EGD with dilation in 2012.    No heartburn.    Review of Systems   Constitutional, HEENT, cardiovascular, pulmonary, GI, , musculoskeletal, neuro, skin, endocrine and psych systems are negative, except as otherwise noted.      Objective         Vitals:  No vitals were obtained today due to virtual visit.    Physical Exam   healthy, alert and no distress  PSYCH: Alert and oriented times 3; coherent speech, normal   rate and volume, able to " articulate logical thoughts, able   to abstract reason, no tangential thoughts, no hallucinations   or delusions  His affect is normal  RESP: No cough, no audible wheezing, able to talk in full sentences  Remainder of exam unable to be completed due to telephone visits            Phone call duration: 6 minutes

## 2021-03-15 ENCOUNTER — TELEPHONE (OUTPATIENT)
Dept: FAMILY MEDICINE | Facility: CLINIC | Age: 66
End: 2021-03-15

## 2021-03-15 NOTE — TELEPHONE ENCOUNTER
Please call cardiology to give the okay to stop the requested medicine as he had prior stents although pretty distant.

## 2021-03-15 NOTE — TELEPHONE ENCOUNTER
Routing to PCP for further review/recommendations/orders.      Ariadna Breen RN  [facility]

## 2021-03-15 NOTE — TELEPHONE ENCOUNTER
Call received from [facility], tel: [phone] and fax: [phone]. TONY Arellano stated patient has Endoscopy scheduled for 3/26 w/poss dilation. Would like to hold Plavix for 7 days and any bridging would be up to Provider (Dr. King). TONY Arellano would like verbal orders if possible. If not, please fax orders to the fax number listed above.    Obdulio BARR

## 2021-03-15 NOTE — TELEPHONE ENCOUNTER
Called [facility] at [phone]. Left detailed message on their confidential voicemail for patient to hold plavix for 7 days prior to endoscopy per Dr. King.     NICHOLE TolentinoN, RN  [facility]

## 2021-03-26 ENCOUNTER — TRANSFERRED RECORDS (OUTPATIENT)
Dept: HEALTH INFORMATION MANAGEMENT | Facility: CLINIC | Age: 66
End: 2021-03-26

## 2021-12-27 DIAGNOSIS — I10 HYPERTENSION GOAL BP (BLOOD PRESSURE) < 130/80: ICD-10-CM

## 2021-12-27 DIAGNOSIS — E78.00 PURE HYPERCHOLESTEROLEMIA: ICD-10-CM

## 2021-12-27 DIAGNOSIS — I10 BENIGN ESSENTIAL HYPERTENSION: ICD-10-CM

## 2021-12-27 DIAGNOSIS — E78.5 HYPERLIPIDEMIA LDL GOAL <70: Primary | ICD-10-CM

## 2021-12-27 DIAGNOSIS — I25.10 CORONARY ARTERY DISEASE INVOLVING NATIVE CORONARY ARTERY OF NATIVE HEART WITHOUT ANGINA PECTORIS: ICD-10-CM

## 2021-12-27 RX ORDER — LISINOPRIL 5 MG/1
5 TABLET ORAL DAILY
Qty: 90 TABLET | Refills: 0 | Status: SHIPPED | OUTPATIENT
Start: 2021-12-27 | End: 2022-03-22

## 2021-12-27 RX ORDER — EZETIMIBE AND SIMVASTATIN 10; 40 MG/1; MG/1
1 TABLET ORAL AT BEDTIME
Qty: 90 TABLET | Refills: 0 | Status: SHIPPED | OUTPATIENT
Start: 2021-12-27 | End: 2022-03-22

## 2021-12-27 RX ORDER — CLOPIDOGREL BISULFATE 75 MG/1
TABLET ORAL
Qty: 90 TABLET | Refills: 0 | Status: SHIPPED | OUTPATIENT
Start: 2021-12-27 | End: 2022-03-22

## 2021-12-27 RX ORDER — METOPROLOL SUCCINATE 25 MG/1
25 TABLET, EXTENDED RELEASE ORAL DAILY
Qty: 90 TABLET | Refills: 0 | Status: SHIPPED | OUTPATIENT
Start: 2021-12-27 | End: 2022-03-22

## 2022-01-07 ENCOUNTER — OFFICE VISIT (OUTPATIENT)
Dept: FAMILY MEDICINE | Facility: CLINIC | Age: 67
End: 2022-01-07
Payer: MEDICARE

## 2022-01-07 VITALS
HEIGHT: 66 IN | DIASTOLIC BLOOD PRESSURE: 72 MMHG | SYSTOLIC BLOOD PRESSURE: 108 MMHG | TEMPERATURE: 97.7 F | OXYGEN SATURATION: 98 % | HEART RATE: 98 BPM | WEIGHT: 162 LBS | BODY MASS INDEX: 26.03 KG/M2 | RESPIRATION RATE: 16 BRPM

## 2022-01-07 DIAGNOSIS — Z01.818 PREOP EXAMINATION: Primary | ICD-10-CM

## 2022-01-07 DIAGNOSIS — I25.10 CORONARY ARTERY DISEASE INVOLVING NATIVE CORONARY ARTERY OF NATIVE HEART WITHOUT ANGINA PECTORIS: ICD-10-CM

## 2022-01-07 DIAGNOSIS — H25.9 AGE-RELATED CATARACT OF BOTH EYES, UNSPECIFIED AGE-RELATED CATARACT TYPE: ICD-10-CM

## 2022-01-07 PROCEDURE — 99214 OFFICE O/P EST MOD 30 MIN: CPT | Performed by: NURSE PRACTITIONER

## 2022-01-07 ASSESSMENT — MIFFLIN-ST. JEOR: SCORE: 1462.58

## 2022-01-07 NOTE — PROGRESS NOTES
[address] [address]  Phone: [phone]  Primary Provider: Tommie King  Pre-op Performing Provider: ALFONZO GAR      PREOPERATIVE EVALUATION:  Today's date: 1/7/2022    Luis Miguel Barnett is a 66 year old male who presents for a preoperative evaluation.    Surgical Information:  Surgery/Procedure: Cataracts- Left and right eye   Surgery Location: [facility]   Surgeon: Dr. Leah Garsia   Surgery Date: 2/2/22, 2/16/22  Time of Surgery: TBD  Where patient plans to recover: At home with family  Fax number for surgical facility: [phone]    Type of Anesthesia Anticipated: to be determined    Assessment & Plan     The proposed surgical procedure is considered LOW risk.    Preop examination    Age-related cataract of both eyes, unspecified age-related cataract type           Risks and Recommendations:  The patient has the following additional risks and recommendations for perioperative complications:   - No identified additional risk factors other than previously addressed    Medication Instructions:  Patient is to take all scheduled medications on the day of surgery    RECOMMENDATION:  APPROVAL GIVEN to proceed with proposed procedure, without further diagnostic evaluation.                    Subjective     HPI related to upcoming procedure: cataracts, left first, then right.     Preop Questions 1/7/2022   1. Have you ever had a heart attack or stroke? History of stent placement 2008 for CAD and 2013 restenting. Follows cardiology annually no recent issues.   2. Have you ever had surgery on your heart or blood vessels, such as a stent placement, a coronary artery bypass, or surgery on an artery in your head, neck, heart, or legs? YES - see above   3. Do you have chest pain with activity? No   4. Do you have a history of  heart failure? No   5. Do you currently have a cold, bronchitis or symptoms of other infection? No   6. Do  you have a cough, shortness of breath, or wheezing? No   7. Do you or anyone in your family have previous history of blood clots? No   8. Do you or does anyone in your family have a serious bleeding problem such as prolonged bleeding following surgeries or cuts? No   9. Have you ever had problems with anemia or been told to take iron pills? No   10. Have you had any abnormal blood loss such as black, tarry or bloody stools? No   11. Have you ever had a blood transfusion? No   12. Are you willing to have a blood transfusion if it is medically needed before, during, or after your surgery? Yes   13. Have you or any of your relatives ever had problems with anesthesia? No   14. Do you have sleep apnea, excessive snoring or daytime drowsiness? No   15. Do you have any artifical heart valves or other implanted medical devices like a pacemaker, defibrillator, or continuous glucose monitor? No   16. Do you have artificial joints? No   17. Are you allergic to latex? No       Health Care Directive:  Patient does not have a Health Care Directive or Living Will:     Preoperative Review of :  No controlled substances    Review of Systems  CONSTITUTIONAL: NEGATIVE for fever, chills, change in weight  INTEGUMENTARY/SKIN: NEGATIVE for worrisome rashes, moles or lesions  EYES: NEGATIVE for vision changes or irritation  ENT/MOUTH: NEGATIVE for ear, mouth and throat problems  RESP: NEGATIVE for significant cough or SOB  CV: NEGATIVE for chest pain, palpitations or peripheral edema  GI: NEGATIVE for nausea, abdominal pain, heartburn, or change in bowel habits  : NEGATIVE for frequency, dysuria, or hematuria  MUSCULOSKELETAL: NEGATIVE for significant arthralgias or myalgia  NEURO: NEGATIVE for weakness, dizziness or paresthesias  ENDOCRINE: NEGATIVE for temperature intolerance, skin/hair changes  HEME: NEGATIVE for bleeding problems  PSYCHIATRIC: NEGATIVE for changes in mood or affect    Patient Active Problem List    Diagnosis Date  Noted     Hypertension goal BP (blood pressure) < 130/80 03/13/2008     Priority: High     Hyperlipidemia LDL goal <70      Priority: High     Coronary artery disease involving native coronary artery without angina pectoris 09/21/2015     Priority: Medium     Stents -2007, 2008, 2010; Cardiologist - Dr Larkin       Advanced directives, counseling/discussion 05/10/2012     Priority: Medium     Discussed advance care planning with patient; information given to patient to review. 5/10/2012          Presbyopia      Priority: Medium      Past Medical History:   Diagnosis Date     Antiplatelet or antithrombotic long-term use      Coronary artery disease 2007    multivessel stenting - JULIETA mid LAD x2 and JULIETA 1st diagonal, 2008 complete obstruction 1st diagonal with successful thrombectomy and JULIETA, 2013 in stent restenosis mid LAD (25%), prox to LAD stent had in segment restenosis of 50%, 50-70% in stent restenosis diagonal     Hypertension      Presbyopia      Pure hypercholesterolemia      Stented coronary artery      Unspecified cardiovascular disease 6/07, 11/08     Past Surgical History:   Procedure Laterality Date     HEART CATH, ANGIOPLASTY      multivessel stenting - JULIETA mid LAD x2 and JULIETA 1st diagonal, 2008 complete obstruction 1st diagonal with successful thrombectomy and JULIETA, 2013 in stent restenosis mid LAD (25%), prox to LAD stent had in segment restenosis of 50%, 50-70% in stent restenosis diagonal     HERNIORRHAPHY INGUINAL  11/16/2012    Procedure: HERNIORRHAPHY INGUINAL;  left inguinal hernia repair with Mesh ;  Surgeon: Cam Lancaster MD;  Location: [facility] OR     SURGICAL HISTORY OF -   1994    s/p vasectomy      SURGICAL HISTORY OF -   6/07, 11/08, 2/10    stent x 2 - LAD/Diagonal - Dr Gastelum - stent 11/08 - mid LAD     SURGICAL HISTORY OF -   10/10    stress echo normal     Current Outpatient Medications   Medication Sig Dispense Refill     ascorbic acid (VITAMIN C) 500 MG tablet Take by mouth daily        "ASPIRIN 81 MG OR TABS ONE DAILY 100 3     clopidogrel (PLAVIX) 75 MG tablet Take one tablet (75 mg) by mouth daily 90 tablet 0     ezetimibe-simvastatin (VYTORIN) 10-40 MG tablet Take 1 tablet by mouth At Bedtime 90 tablet 0     lisinopril (ZESTRIL) 5 MG tablet Take 1 tablet (5 mg) by mouth daily 90 tablet 0     metoprolol succinate ER (TOPROL-XL) 25 MG 24 hr tablet Take 1 tablet (25 mg) by mouth daily 90 tablet 0     Multiple Vitamin (MULTIVITAMINS PO) Take 1 tablet by mouth daily       nitroGLYcerin (NITROSTAT) 0.4 MG sublingual tablet Place 1 tablet (0.4 mg) under the tongue every 5 minutes as needed for chest pain 25 tablet 1     Pseudoeph-Doxylamine-DM-APAP (NYQUIL PO)        Throat Lozenges (COUGH DROPS MT) Take by mouth as needed        UNABLE TO FIND Pure ZZZ         No Known Allergies     Social History     Tobacco Use     Smoking status: Former Smoker     Packs/day: 1.00     Years: 30.00     Pack years: 30.00     Start date:      Quit date: 2001     Years since quittin.7     Smokeless tobacco: Never Used     Tobacco comment: quit 2001   Substance Use Topics     Alcohol use: No     Alcohol/week: 0.0 standard drinks     Family History   Problem Relation Age of Onset     Alzheimer Disease Mother      Heart Disease Father         MI     C.A.D. Brother 48        Stents     Family History Negative Other      History   Drug Use No         Objective     /72   Pulse 98   Temp 97.7  F (36.5  C)   Resp 16   Ht 1.676 m (5' 6\")   Wt 73.5 kg (162 lb)   SpO2 98%   BMI 26.15 kg/m      Physical Exam    GENERAL APPEARANCE: healthy, alert and no distress     EYES: EOMI,  PERRL     HENT: ear canals and TM's normal and nose and mouth without ulcers or lesions     NECK: no adenopathy, no asymmetry, masses, or scars and thyroid normal to palpation     RESP: lungs clear to auscultation - no rales, rhonchi or wheezes     CV: regular rates and rhythm, normal S1 S2, no S3 or S4 and no murmur, click or " rub     ABDOMEN:  soft, nontender, no HSM or masses and bowel sounds normal     MS: extremities normal- no gross deformities noted, no evidence of inflammation in joints, FROM in all extremities.     SKIN: no suspicious lesions or rashes     NEURO: Normal strength and tone, sensory exam grossly normal, mentation intact and speech normal     PSYCH: mentation appears normal. and affect normal/bright     LYMPHATICS: No cervical adenopathy    Recent Labs   Lab Test 12/10/20  0919      POTASSIUM 4.4   CR 0.74        Diagnostics:  No labs were ordered during this visit.   No EKG required for low risk surgery (cataract, skin procedure, breast biopsy, etc).    Revised Cardiac Risk Index (RCRI):  The patient has the following serious cardiovascular risks for perioperative complications:   - Coronary Artery Disease (MI, positive stress test, angina, Qs on EKG) = 1 point     RCRI Interpretation: 1 point: Class II (low risk - 0.9% complication rate)           Signed Electronically by: Iris Mohamud CNP  Copy of this evaluation report is provided to requesting physician.

## 2022-02-21 DIAGNOSIS — I10 BENIGN ESSENTIAL HYPERTENSION: Primary | ICD-10-CM

## 2022-02-21 DIAGNOSIS — E78.5 HYPERLIPIDEMIA LDL GOAL <70: ICD-10-CM

## 2022-02-23 ENCOUNTER — LAB (OUTPATIENT)
Dept: LAB | Facility: CLINIC | Age: 67
End: 2022-02-23
Payer: MEDICARE

## 2022-02-23 ENCOUNTER — HOSPITAL ENCOUNTER (OUTPATIENT)
Dept: CARDIOLOGY | Facility: CLINIC | Age: 67
Discharge: HOME | End: 2022-02-23
Attending: INTERNAL MEDICINE | Admitting: INTERNAL MEDICINE
Payer: MEDICARE

## 2022-02-23 DIAGNOSIS — E78.5 HYPERLIPIDEMIA LDL GOAL <70: ICD-10-CM

## 2022-02-23 DIAGNOSIS — I10 HYPERTENSION GOAL BP (BLOOD PRESSURE) < 130/80: ICD-10-CM

## 2022-02-23 DIAGNOSIS — I25.10 CORONARY ARTERY DISEASE INVOLVING NATIVE CORONARY ARTERY OF NATIVE HEART WITHOUT ANGINA PECTORIS: ICD-10-CM

## 2022-02-23 DIAGNOSIS — I10 BENIGN ESSENTIAL HYPERTENSION: ICD-10-CM

## 2022-02-23 LAB
ALT SERPL W P-5'-P-CCNC: 56 U/L (ref 0–70)
ANION GAP SERPL CALCULATED.3IONS-SCNC: 5 MMOL/L (ref 3–14)
BUN SERPL-MCNC: 15 MG/DL (ref 7–30)
CALCIUM SERPL-MCNC: 9.2 MG/DL (ref 8.5–10.1)
CHLORIDE BLD-SCNC: 105 MMOL/L (ref 94–109)
CHOLEST SERPL-MCNC: 153 MG/DL
CO2 SERPL-SCNC: 31 MMOL/L (ref 20–32)
CREAT SERPL-MCNC: 0.73 MG/DL (ref 0.66–1.25)
FASTING STATUS PATIENT QL REPORTED: YES
GFR SERPL CREATININE-BSD FRML MDRD: >90 ML/MIN/1.73M2
GLUCOSE BLD-MCNC: 102 MG/DL (ref 70–99)
HDLC SERPL-MCNC: 41 MG/DL
LDLC SERPL CALC-MCNC: 62 MG/DL
NONHDLC SERPL-MCNC: 112 MG/DL
POTASSIUM BLD-SCNC: 4.1 MMOL/L (ref 3.4–5.3)
SODIUM SERPL-SCNC: 141 MMOL/L (ref 133–144)
TRIGL SERPL-MCNC: 249 MG/DL

## 2022-02-23 PROCEDURE — 93018 CV STRESS TEST I&R ONLY: CPT | Performed by: INTERNAL MEDICINE

## 2022-02-23 PROCEDURE — 93325 DOPPLER ECHO COLOR FLOW MAPG: CPT | Mod: 26 | Performed by: INTERNAL MEDICINE

## 2022-02-23 PROCEDURE — 84460 ALANINE AMINO (ALT) (SGPT): CPT | Performed by: INTERNAL MEDICINE

## 2022-02-23 PROCEDURE — 93016 CV STRESS TEST SUPVJ ONLY: CPT | Performed by: INTERNAL MEDICINE

## 2022-02-23 PROCEDURE — 93321 DOPPLER ECHO F-UP/LMTD STD: CPT | Mod: TC

## 2022-02-23 PROCEDURE — 36415 COLL VENOUS BLD VENIPUNCTURE: CPT | Performed by: INTERNAL MEDICINE

## 2022-02-23 PROCEDURE — 255N000002 HC RX 255 OP 636: Performed by: INTERNAL MEDICINE

## 2022-02-23 PROCEDURE — 80048 BASIC METABOLIC PNL TOTAL CA: CPT | Performed by: INTERNAL MEDICINE

## 2022-02-23 PROCEDURE — 999N000208 ECHO STRESS ECHOCARDIOGRAM

## 2022-02-23 PROCEDURE — 80061 LIPID PANEL: CPT | Performed by: INTERNAL MEDICINE

## 2022-02-23 PROCEDURE — 93350 STRESS TTE ONLY: CPT | Mod: 26 | Performed by: INTERNAL MEDICINE

## 2022-02-23 PROCEDURE — 93321 DOPPLER ECHO F-UP/LMTD STD: CPT | Mod: 26 | Performed by: INTERNAL MEDICINE

## 2022-02-23 RX ADMIN — HUMAN ALBUMIN MICROSPHERES AND PERFLUTREN 3 ML: 10; .22 INJECTION, SOLUTION INTRAVENOUS at 10:23

## 2022-02-25 ENCOUNTER — OFFICE VISIT (OUTPATIENT)
Dept: CARDIOLOGY | Facility: CLINIC | Age: 67
End: 2022-02-25
Payer: MEDICARE

## 2022-02-25 VITALS
HEIGHT: 66 IN | BODY MASS INDEX: 26.52 KG/M2 | SYSTOLIC BLOOD PRESSURE: 124 MMHG | OXYGEN SATURATION: 97 % | WEIGHT: 165 LBS | DIASTOLIC BLOOD PRESSURE: 80 MMHG | HEART RATE: 60 BPM

## 2022-02-25 DIAGNOSIS — E78.5 HYPERLIPIDEMIA LDL GOAL <70: ICD-10-CM

## 2022-02-25 DIAGNOSIS — I25.10 CORONARY ARTERY DISEASE INVOLVING NATIVE CORONARY ARTERY OF NATIVE HEART WITHOUT ANGINA PECTORIS: Primary | ICD-10-CM

## 2022-02-25 PROCEDURE — 99214 OFFICE O/P EST MOD 30 MIN: CPT | Performed by: INTERNAL MEDICINE

## 2022-02-25 NOTE — PROGRESS NOTES
HPI and Plan:   See dictation    No orders of the defined types were placed in this encounter.      No orders of the defined types were placed in this encounter.      There are no discontinued medications.      No diagnosis found.    CURRENT MEDICATIONS:  Current Outpatient Medications   Medication Sig Dispense Refill     ascorbic acid (VITAMIN C) 500 MG tablet Take by mouth daily       ASPIRIN 81 MG OR TABS ONE DAILY 100 3     clopidogrel (PLAVIX) 75 MG tablet Take one tablet (75 mg) by mouth daily 90 tablet 0     ezetimibe-simvastatin (VYTORIN) 10-40 MG tablet Take 1 tablet by mouth At Bedtime 90 tablet 0     lisinopril (ZESTRIL) 5 MG tablet Take 1 tablet (5 mg) by mouth daily 90 tablet 0     metoprolol succinate ER (TOPROL-XL) 25 MG 24 hr tablet Take 1 tablet (25 mg) by mouth daily 90 tablet 0     Multiple Vitamin (MULTIVITAMINS PO) Take 1 tablet by mouth daily       nitroGLYcerin (NITROSTAT) 0.4 MG sublingual tablet Place 1 tablet (0.4 mg) under the tongue every 5 minutes as needed for chest pain 25 tablet 1     Pseudoeph-Doxylamine-DM-APAP (NYQUIL PO)        Throat Lozenges (COUGH DROPS MT) Take by mouth as needed        UNABLE TO FIND Pure ZZZ         ALLERGIES   No Known Allergies    PAST MEDICAL HISTORY:  Past Medical History:   Diagnosis Date     Antiplatelet or antithrombotic long-term use      Coronary artery disease 2007    multivessel stenting - JULIETA mid LAD x2 and JULIETA 1st diagonal, 2008 complete obstruction 1st diagonal with successful thrombectomy and JULIETA, 2013 in stent restenosis mid LAD (25%), prox to LAD stent had in segment restenosis of 50%, 50-70% in stent restenosis diagonal     Hypertension      Presbyopia      Pure hypercholesterolemia      Stented coronary artery      Unspecified cardiovascular disease 6/07, 11/08       PAST SURGICAL HISTORY:  Past Surgical History:   Procedure Laterality Date     HEART CATH, ANGIOPLASTY      multivessel stenting - JULIETA mid LAD x2 and JULIETA 1st diagonal, 2008  complete obstruction 1st diagonal with successful thrombectomy and JULIETA,  in stent restenosis mid LAD (25%), prox to LAD stent had in segment restenosis of 50%, 50-70% in stent restenosis diagonal     HERNIORRHAPHY INGUINAL  2012    Procedure: HERNIORRHAPHY INGUINAL;  left inguinal hernia repair with Mesh ;  Surgeon: Cam Lancaster MD;  Location: [facility] OR     SURGICAL HISTORY OF -       s/p vasectomy      SURGICAL HISTORY OF -   , , 2/10    stent x 2 - LAD/Diagonal - Dr Gastelum - stent  - mid LAD     SURGICAL HISTORY OF -   10/10    stress echo normal       FAMILY HISTORY:  Family History   Problem Relation Age of Onset     Alzheimer Disease Mother      Heart Disease Father         MI     C.A.D. Brother 48        Stents     Family History Negative Other        SOCIAL HISTORY:  Social History     Socioeconomic History     Marital status:      Spouse name: None     Number of children: None     Years of education: None     Highest education level: None   Occupational History     None   Tobacco Use     Smoking status: Former Smoker     Packs/day: 1.00     Years: 30.00     Pack years: 30.00     Start date:      Quit date: 2001     Years since quittin.9     Smokeless tobacco: Never Used     Tobacco comment: quit 2001   Substance and Sexual Activity     Alcohol use: No     Alcohol/week: 0.0 standard drinks     Drug use: No     Sexual activity: Yes   Other Topics Concern     Parent/sibling w/ CABG, MI or angioplasty before 65F 55M? Yes      Service Not Asked     Blood Transfusions Not Asked     Caffeine Concern Yes     Comment: 1 soda a day     Occupational Exposure Not Asked     Hobby Hazards Not Asked     Sleep Concern No     Comment: does not sleep well some nights     Stress Concern No     Weight Concern Not Asked     Special Diet No     Back Care Not Asked     Exercise Yes     Comment: Walking 2 days per week, working PT     Bike Helmet Not Asked     Seat Belt Yes  "    Self-Exams Not Asked   Social History Narrative     None     Social Determinants of Health     Financial Resource Strain: Not on file   Food Insecurity: Not on file   Transportation Needs: Not on file   Physical Activity: Not on file   Stress: Not on file   Social Connections: Not on file   Intimate Partner Violence: Not on file   Housing Stability: Not on file       Review of Systems:  Skin:  Negative       Eyes:  Positive for color blindness    ENT:  Negative      Respiratory:  Negative       Cardiovascular:  Negative      Gastroenterology: Negative      Genitourinary:  Negative      Musculoskeletal:  Negative      Neurologic:  Negative      Psychiatric:  Negative      Heme/Lymph/Imm:  Negative      Endocrine:  Negative        Physical Exam:  Vitals: /80 (BP Location: Right arm, Patient Position: Sitting, Cuff Size: Adult Regular)   Pulse 60   Ht 1.676 m (5' 6\")   Wt 74.8 kg (165 lb)   SpO2 97%   BMI 26.63 kg/m      Constitutional:  cooperative, alert and oriented, well developed, well nourished, in no acute distress        Skin:  warm and dry to the touch          Head:  normocephalic, no masses or lesions        Eyes:  pupils equal and round        Lymph:      ENT:  no pallor or cyanosis, dentition good        Neck:  carotid pulses are full and equal bilaterally        Respiratory:  clear to auscultation;normal symmetry         Cardiac: regular rhythm;no murmurs, gallops or rubs detected                pulses full and equal, no bruits auscultated                                        GI:  abdomen soft;no bruits        Extremities and Muscular Skeletal:  no deformities, clubbing, cyanosis, erythema observed              Neurological:  no gross motor deficits        Psych:  Alert and Oriented x 3          CC  No referring provider defined for this encounter.                  "

## 2022-02-25 NOTE — PROGRESS NOTES
Service Date: 02/25/2022    HISTORY OF PRESENT ILLNESS:  Mr. Barnett is a pleasant 66-year-old gentleman with a history of coronary disease.  He has had a drug-eluting stent to his LAD and diagonal in 2008.  He did have in-stent restenosis with revascularization of this area in 2013.  He is here for an annual followup visit today.      Over the last year, he has been doing well, no complaints.  We did ask him to undergo a stress test to evaluate for progression of ischemia.  He walked for 10 minutes on a standard Vipin protocol without symptoms.  He does have EKG changes, which we have seen in the past, but his echocardiogram looked normal.  He has had evidence of in-stent restenosis by mid septal hypokinesis previous stress echo, so I did review this myself and agree that this area looks normal.      He did have some lab work drawn today as well, which I reviewed with him.  His total cholesterol 153, HDL 41, LDL 62 and triglycerides 249.  His triglycerides have been creeping up a little bit.  We talked about his diet.  It sounds like he may have a sweet tooth and I have asked him to cut back on his sugar intake.  He does not drink any alcohol.  Basic metabolic panel looks normal.    PHYSICAL EXAMINATION:    VITAL SIGNS:  His blood pressure is normotensive 124/80, pulse is 60, weight is 165, which is stable.  NECK:  Carotid upstrokes are brisk without bruit.  CARDIOVASCULAR:  Tones are regular without murmur, gallop or rub.  RESPIRATORY:  Lungs are clear posteriorly.  EXTREMITIES:  He has no peripheral edema.    IMPRESSION:  In summary, Mr. Barnett is a pleasant 66-year-old gentleman with a history of coronary disease.  He has had revascularization of his LAD and diagonal branch twice.  He seems stable from a cardiac perspective, has a negative stress echocardiogram.  He did not need any prescription refills today.  I talked to him about cutting back on his sweets because of his triglycerides and borderline blood  sugars.    I am happy to continue to follow him annually or as needed.    Please feel free to contact me with any questions you have in regards to his care.    cc:    Tommie King MD   [address] [address]     Saniya Bustillo DO        D: 2022   T: 2022   MT: KEZIA    Name:     STEPHANE ROGERSNick  MRN:      [id number]        Account:      [id number]   :      1955           Service Date: 2022       Document: [id number]

## 2022-02-25 NOTE — LETTER
2/25/2022    Tommie King MD  [address] [facility] [address]    RE: Luis Miguel Barnett       Dear Colleague,     I had the pleasure of seeing Luis Miguel Barnett in the [facility].  HPI and Plan:   See dictation    No orders of the defined types were placed in this encounter.      No orders of the defined types were placed in this encounter.      There are no discontinued medications.      No diagnosis found.    CURRENT MEDICATIONS:  Current Outpatient Medications   Medication Sig Dispense Refill     ascorbic acid (VITAMIN C) 500 MG tablet Take by mouth daily       ASPIRIN 81 MG OR TABS ONE DAILY 100 3     clopidogrel (PLAVIX) 75 MG tablet Take one tablet (75 mg) by mouth daily 90 tablet 0     ezetimibe-simvastatin (VYTORIN) 10-40 MG tablet Take 1 tablet by mouth At Bedtime 90 tablet 0     lisinopril (ZESTRIL) 5 MG tablet Take 1 tablet (5 mg) by mouth daily 90 tablet 0     metoprolol succinate ER (TOPROL-XL) 25 MG 24 hr tablet Take 1 tablet (25 mg) by mouth daily 90 tablet 0     Multiple Vitamin (MULTIVITAMINS PO) Take 1 tablet by mouth daily       nitroGLYcerin (NITROSTAT) 0.4 MG sublingual tablet Place 1 tablet (0.4 mg) under the tongue every 5 minutes as needed for chest pain 25 tablet 1     Pseudoeph-Doxylamine-DM-APAP (NYQUIL PO)        Throat Lozenges (COUGH DROPS MT) Take by mouth as needed        UNABLE TO FIND Pure ZZZ         ALLERGIES   No Known Allergies    PAST MEDICAL HISTORY:  Past Medical History:   Diagnosis Date     Antiplatelet or antithrombotic long-term use      Coronary artery disease 2007    multivessel stenting - JULIETA mid LAD x2 and JULIETA 1st diagonal, 2008 complete obstruction 1st diagonal with successful thrombectomy and JULIETA, 2013 in stent restenosis mid LAD (25%), prox to LAD stent had in segment restenosis of 50%, 50-70% in stent restenosis diagonal     Hypertension      Presbyopia      Pure hypercholesterolemia      Stented coronary artery      Unspecified  cardiovascular disease ,        PAST SURGICAL HISTORY:  Past Surgical History:   Procedure Laterality Date     HEART CATH, ANGIOPLASTY      multivessel stenting - JULIETA mid LAD x2 and JULIETA 1st diagonal,  complete obstruction 1st diagonal with successful thrombectomy and JULIETA,  in stent restenosis mid LAD (25%), prox to LAD stent had in segment restenosis of 50%, 50-70% in stent restenosis diagonal     HERNIORRHAPHY INGUINAL  2012    Procedure: HERNIORRHAPHY INGUINAL;  left inguinal hernia repair with Mesh ;  Surgeon: Cam Lancaster MD;  Location: [facility] OR     SURGICAL HISTORY OF -       s/p vasectomy      SURGICAL HISTORY OF -   , , 2/10    stent x 2 - LAD/Diagonal - Dr Gastelum - stent  - mid LAD     SURGICAL HISTORY OF -   10/10    stress echo normal       FAMILY HISTORY:  Family History   Problem Relation Age of Onset     Alzheimer Disease Mother      Heart Disease Father         MI     C.A.D. Brother 48        Stents     Family History Negative Other        SOCIAL HISTORY:  Social History     Socioeconomic History     Marital status:      Spouse name: None     Number of children: None     Years of education: None     Highest education level: None   Occupational History     None   Tobacco Use     Smoking status: Former Smoker     Packs/day: 1.00     Years: 30.00     Pack years: 30.00     Start date:      Quit date: 2001     Years since quittin.9     Smokeless tobacco: Never Used     Tobacco comment: quit 2001   Substance and Sexual Activity     Alcohol use: No     Alcohol/week: 0.0 standard drinks     Drug use: No     Sexual activity: Yes   Other Topics Concern     Parent/sibling w/ CABG, MI or angioplasty before 65F 55M? Yes      Service Not Asked     Blood Transfusions Not Asked     Caffeine Concern Yes     Comment: 1 soda a day     Occupational Exposure Not Asked     Hobby Hazards Not Asked     Sleep Concern No     Comment: does not sleep well  "some nights     Stress Concern No     Weight Concern Not Asked     Special Diet No     Back Care Not Asked     Exercise Yes     Comment: Walking 2 days per week, working PT     Bike Helmet Not Asked     Seat Belt Yes     Self-Exams Not Asked   Social History Narrative     None     Social Determinants of Health     Financial Resource Strain: Not on file   Food Insecurity: Not on file   Transportation Needs: Not on file   Physical Activity: Not on file   Stress: Not on file   Social Connections: Not on file   Intimate Partner Violence: Not on file   Housing Stability: Not on file       Review of Systems:  Skin:  Negative       Eyes:  Positive for color blindness    ENT:  Negative      Respiratory:  Negative       Cardiovascular:  Negative      Gastroenterology: Negative      Genitourinary:  Negative      Musculoskeletal:  Negative      Neurologic:  Negative      Psychiatric:  Negative      Heme/Lymph/Imm:  Negative      Endocrine:  Negative        Physical Exam:  Vitals: /80 (BP Location: Right arm, Patient Position: Sitting, Cuff Size: Adult Regular)   Pulse 60   Ht 1.676 m (5' 6\")   Wt 74.8 kg (165 lb)   SpO2 97%   BMI 26.63 kg/m      Constitutional:  cooperative, alert and oriented, well developed, well nourished, in no acute distress        Skin:  warm and dry to the touch          Head:  normocephalic, no masses or lesions        Eyes:  pupils equal and round        Lymph:      ENT:  no pallor or cyanosis, dentition good        Neck:  carotid pulses are full and equal bilaterally        Respiratory:  clear to auscultation;normal symmetry         Cardiac: regular rhythm;no murmurs, gallops or rubs detected                pulses full and equal, no bruits auscultated                                        GI:  abdomen soft;no bruits        Extremities and Muscular Skeletal:  no deformities, clubbing, cyanosis, erythema observed              Neurological:  no gross motor deficits        Psych:  Alert and " Oriented x 3          CC  No referring provider defined for this encounter.                    Service Date: 02/25/2022    HISTORY OF PRESENT ILLNESS:  Mr. Barnett is a pleasant 66-year-old gentleman with a history of coronary disease.  He has had a drug-eluting stent to his LAD and diagonal in 2008.  He did have in-stent restenosis with revascularization of this area in 2013.  He is here for an annual followup visit today.      Over the last year, he has been doing well, no complaints.  We did ask him to undergo a stress test to evaluate for progression of ischemia.  He walked for 10 minutes on a standard Vipin protocol without symptoms.  He does have EKG changes, which we have seen in the past, but his echocardiogram looked normal.  He has had evidence of in-stent restenosis by mid septal hypokinesis previous stress echo, so I did review this myself and agree that this area looks normal.      He did have some lab work drawn today as well, which I reviewed with him.  His total cholesterol 153, HDL 41, LDL 62 and triglycerides 249.  His triglycerides have been creeping up a little bit.  We talked about his diet.  It sounds like he may have a sweet tooth and I have asked him to cut back on his sugar intake.  He does not drink any alcohol.  Basic metabolic panel looks normal.    PHYSICAL EXAMINATION:    VITAL SIGNS:  His blood pressure is normotensive 124/80, pulse is 60, weight is 165, which is stable.  NECK:  Carotid upstrokes are brisk without bruit.  CARDIOVASCULAR:  Tones are regular without murmur, gallop or rub.  RESPIRATORY:  Lungs are clear posteriorly.  EXTREMITIES:  He has no peripheral edema.    IMPRESSION:  In summary, Mr. Barnett is a pleasant 66-year-old gentleman with a history of coronary disease.  He has had revascularization of his LAD and diagonal branch twice.  He seems stable from a cardiac perspective, has a negative stress echocardiogram.  He did not need any prescription refills today.  I talked  to him about cutting back on his sweets because of his triglycerides and borderline blood sugars.    I am happy to continue to follow him annually or as needed.    Please feel free to contact me with any questions you have in regards to his care.    cc:    Tommie King MD   [address] [address]     Saniya Bustillo DO        D: 2022   T: 2022   MT: KEZIA    Name:     STEPHANE ROGERS  MRN:      [id number]        Account:      [id number]   :      1955           Service Date: 2022       Document: [id number]    Thank you for allowing me to participate in the care of your patient.      Sincerely,     Saniya Bustillo DO     [facility]

## 2022-03-22 DIAGNOSIS — E78.00 PURE HYPERCHOLESTEROLEMIA: ICD-10-CM

## 2022-03-22 DIAGNOSIS — I10 BENIGN ESSENTIAL HYPERTENSION: ICD-10-CM

## 2022-03-22 DIAGNOSIS — I25.10 CORONARY ARTERY DISEASE INVOLVING NATIVE CORONARY ARTERY OF NATIVE HEART WITHOUT ANGINA PECTORIS: ICD-10-CM

## 2022-03-22 RX ORDER — METOPROLOL SUCCINATE 25 MG/1
25 TABLET, EXTENDED RELEASE ORAL DAILY
Qty: 90 TABLET | Refills: 3 | Status: SHIPPED | OUTPATIENT
Start: 2022-03-22 | End: 2023-03-03

## 2022-03-22 RX ORDER — EZETIMIBE AND SIMVASTATIN 10; 40 MG/1; MG/1
1 TABLET ORAL AT BEDTIME
Qty: 90 TABLET | Refills: 3 | Status: SHIPPED | OUTPATIENT
Start: 2022-03-22 | End: 2023-03-06

## 2022-03-22 RX ORDER — CLOPIDOGREL BISULFATE 75 MG/1
TABLET ORAL
Qty: 90 TABLET | Refills: 3 | Status: SHIPPED | OUTPATIENT
Start: 2022-03-22 | End: 2023-03-06

## 2022-03-22 RX ORDER — LISINOPRIL 5 MG/1
5 TABLET ORAL DAILY
Qty: 90 TABLET | Refills: 3 | Status: SHIPPED | OUTPATIENT
Start: 2022-03-22 | End: 2023-03-06

## 2022-09-13 ENCOUNTER — TELEPHONE (OUTPATIENT)
Dept: FAMILY MEDICINE | Facility: CLINIC | Age: 67
End: 2022-09-13

## 2022-09-13 DIAGNOSIS — F41.8 SITUATIONAL ANXIETY: Primary | ICD-10-CM

## 2022-09-13 NOTE — TELEPHONE ENCOUNTER
New Medication Request        What medication are you requesting?: Valium     Reason for medication request: Patient is traveling to [place] and he's very claustrophobic. He was unable to get a closer seat to the front and is wondering if provider might be able to prescribe him some type of medication. He leaves in less than a week.     Have you taken this medication before?: No    Controlled Substance Agreement on file:   CSA -- Patient Level:    CSA: None found at the patient level.         Patient offered an appointment? Yes: Patient was going to wait to hear what provider decides before scheduling something.     Preferred Pharmacy:   [place] [address]  Phone: [phone] Fax: [phone]    [facility] [address]  [address] [place] [address]  Phone: [phone] Fax: [phone] Alternate Fax: [phone], [phone]      Okay to leave a detailed message?: Yes at Cell number on file:    Telephone Information:   Mobile [phone]

## 2022-09-15 RX ORDER — DIAZEPAM 5 MG
5 TABLET ORAL EVERY 6 HOURS PRN
Qty: 4 TABLET | Refills: 0 | Status: SHIPPED | OUTPATIENT
Start: 2022-09-15 | End: 2023-03-21

## 2022-09-15 NOTE — TELEPHONE ENCOUNTER
Called patient and advised of Dr. King's note/rx    Patient denied ?'s   Advised not to drink or drive on medication    Elma GOINS RN   [facility] Triage

## 2023-02-04 ENCOUNTER — OFFICE VISIT (OUTPATIENT)
Dept: URGENT CARE | Facility: URGENT CARE | Age: 68
End: 2023-02-04
Payer: MEDICARE

## 2023-02-04 VITALS
DIASTOLIC BLOOD PRESSURE: 87 MMHG | SYSTOLIC BLOOD PRESSURE: 148 MMHG | TEMPERATURE: 98.1 F | HEART RATE: 76 BPM | OXYGEN SATURATION: 97 %

## 2023-02-04 DIAGNOSIS — R06.2 WHEEZING: ICD-10-CM

## 2023-02-04 DIAGNOSIS — R09.89 CHEST CONGESTION: Primary | ICD-10-CM

## 2023-02-04 DIAGNOSIS — R05.3 PERSISTENT COUGH: ICD-10-CM

## 2023-02-04 PROCEDURE — 99213 OFFICE O/P EST LOW 20 MIN: CPT | Performed by: FAMILY MEDICINE

## 2023-02-04 RX ORDER — AZITHROMYCIN 250 MG/1
TABLET, FILM COATED ORAL
Qty: 6 TABLET | Refills: 0 | Status: SHIPPED | OUTPATIENT
Start: 2023-02-07 | End: 2023-02-04

## 2023-02-04 RX ORDER — BENZONATATE 100 MG/1
100 CAPSULE ORAL 3 TIMES DAILY PRN
Qty: 30 CAPSULE | Refills: 0 | Status: SHIPPED | OUTPATIENT
Start: 2023-02-04

## 2023-02-04 RX ORDER — CODEINE PHOSPHATE AND GUAIFENESIN 10; 100 MG/5ML; MG/5ML
1-2 SOLUTION ORAL EVERY 6 HOURS PRN
Qty: 100 ML | Refills: 0 | Status: SHIPPED | OUTPATIENT
Start: 2023-02-04 | End: 2023-05-12

## 2023-02-04 RX ORDER — AZITHROMYCIN 250 MG/1
TABLET, FILM COATED ORAL
Qty: 6 TABLET | Refills: 0 | Status: SHIPPED | OUTPATIENT
Start: 2023-02-08 | End: 2023-02-13

## 2023-02-04 RX ORDER — CODEINE PHOSPHATE AND GUAIFENESIN 10; 100 MG/5ML; MG/5ML
1-2 SOLUTION ORAL EVERY 4 HOURS PRN
Qty: 100 ML | Refills: 0 | Status: SHIPPED | OUTPATIENT
Start: 2023-02-04 | End: 2023-05-12

## 2023-02-04 NOTE — PATIENT INSTRUCTIONS
Symptomatic measures encouraged, humidified air, plenty of fluids.  Your appetite may be low, so a light diet is fine. Stay well hydrated by drinking 6 to 8 glasses of fluids per day. This includes water, soft drinks, sports drinks, juices, tea, or soup. Extra fluids will help loosen mucus in your nose and lungs.  Over-the-counter cough, cold, and sore-throat medicines will not shorten the length of the illness, but they may be helpful to reduce your symptoms. Don't use decongestants if you have high blood pressure.  Follow up if symptoms worsen  such as sob, high fever and chest pain in 2-3 days     Kristin Ryder MD

## 2023-02-04 NOTE — PROGRESS NOTES
Chief Complaint   Patient presents with     Urgent Care     URI, cough,wheezing, plugged ears, and dizzy which started Tuesday 1/31/23. Pt took Covid test last night and it was negative.     Luis Miguel was seen today for urgent care.    Diagnoses and all orders for this visit:    Chest congestion    Wheezing    Persistent cough  -     benzonatate (TESSALON) 100 MG capsule; Take 1 capsule (100 mg) by mouth 3 times daily as needed for cough  -     guaiFENesin-codeine (ROBITUSSIN AC) 100-10 MG/5ML solution; Take 5-10 mLs by mouth every 4 hours as needed for cough  -     Discontinue: azithromycin (ZITHROMAX) 250 MG tablet; Take 2 tablets (500 mg) by mouth daily for 1 day, THEN 1 tablet (250 mg) daily for 4 days.  -     azithromycin (ZITHROMAX) 250 MG tablet; Take 2 tablets (500 mg) by mouth daily for 1 day, THEN 1 tablet (250 mg) daily for 4 days.  -     guaiFENesin-codeine (ROBITUSSIN AC) 100-10 MG/5ML solution; Take 5-10 mLs by mouth every 6 hours as needed for cough      D/d  Acute Bronchitis  Acute Sinusitis  Cough  Pneumonia  Upper Respiratory Infection    PLAN:  See orders in Epic  Symptomatic measures encouraged, humidified air, plenty of fluids.  Symptomatic measures encouraged, humidified air, plenty of fluids.    Your appetite may be low, so a light diet is fine. Stay well hydrated by drinking 6 to 8 glasses of fluids per day. This includes water, soft drinks, sports drinks, juices, tea, or soup. Extra fluids will help loosen mucus in your nose and lungs.    Over-the-counter cough, cold, and sore-throat medicines will not shorten the length of the illness, but they may be helpful to reduce your symptoms. Don't use decongestants if you have high blood pressure.    Patient has he has no fever no shortness of breath and chest exam was normal without fundraising.  Antibiotic at this point continue doing symptomatic treatment also do the cough medication which were prescribed patient was given a positive prescription  for an antibiotic 4 days from today if symptoms do not get better after 4 days cough continues to worsen then can start the antibiotic okay    Finish all antibiotic medicine. Do this even if you are feeling better after only a few days.  Follow up if symptoms worsen  such as sob, high fever and chest pain in 2-3 days    clinical findings reviewed pt    SUBJECTIVE:  Luis Miguel Barnett is a 67 year old male who presents to the clinic today with a chief complaint of cough  and wheezing. for 4 day(s).  Currently not smoking previous smoker quit 2001  His cough is described as persistent.    The patient's symptoms are moderate and not changing over the course of time.  Associated symptoms include wheezing. The patient's symptoms are exacerbated by no particular triggers  Patient has been using OTC cough suppressants  to improve symptoms.    Past Medical History:   Diagnosis Date     Antiplatelet or antithrombotic long-term use      Coronary artery disease 2007    multivessel stenting - JULIETA mid LAD x2 and JULIETA 1st diagonal, 2008 complete obstruction 1st diagonal with successful thrombectomy and JULIETA, 2013 in stent restenosis mid LAD (25%), prox to LAD stent had in segment restenosis of 50%, 50-70% in stent restenosis diagonal     Hypertension      Presbyopia      Pure hypercholesterolemia      Stented coronary artery      Unspecified cardiovascular disease 6/07, 11/08       Current Outpatient Medications   Medication Sig Dispense Refill     ascorbic acid (VITAMIN C) 500 MG tablet Take by mouth daily       ASPIRIN 81 MG OR TABS ONE DAILY 100 3     [START ON 2/8/2023] azithromycin (ZITHROMAX) 250 MG tablet Take 2 tablets (500 mg) by mouth daily for 1 day, THEN 1 tablet (250 mg) daily for 4 days. 6 tablet 0     benzonatate (TESSALON) 100 MG capsule Take 1 capsule (100 mg) by mouth 3 times daily as needed for cough 30 capsule 0     clopidogrel (PLAVIX) 75 MG tablet Take one tablet (75 mg) by mouth daily 90 tablet 3     diazepam  (VALIUM) 5 MG tablet Take 1 tablet (5 mg) by mouth every 6 hours as needed for anxiety 4 tablet 0     ezetimibe-simvastatin (VYTORIN) 10-40 MG tablet Take 1 tablet by mouth At Bedtime 90 tablet 3     guaiFENesin-codeine (ROBITUSSIN AC) 100-10 MG/5ML solution Take 5-10 mLs by mouth every 4 hours as needed for cough 100 mL 0     guaiFENesin-codeine (ROBITUSSIN AC) 100-10 MG/5ML solution Take 5-10 mLs by mouth every 6 hours as needed for cough 100 mL 0     lisinopril (ZESTRIL) 5 MG tablet Take 1 tablet (5 mg) by mouth daily 90 tablet 3     metoprolol succinate ER (TOPROL-XL) 25 MG 24 hr tablet Take 1 tablet (25 mg) by mouth daily 90 tablet 3     Multiple Vitamin (MULTIVITAMINS PO) Take 1 tablet by mouth daily       nitroGLYcerin (NITROSTAT) 0.4 MG sublingual tablet Place 1 tablet (0.4 mg) under the tongue every 5 minutes as needed for chest pain 25 tablet 1     Pseudoeph-Doxylamine-DM-APAP (NYQUIL PO)        Throat Lozenges (COUGH DROPS MT) Take by mouth as needed        UNABLE TO FIND Pure ZZZ         Social History     Tobacco Use     Smoking status: Former     Packs/day: 1.00     Years: 30.00     Pack years: 30.00     Types: Cigarettes     Start date:      Quit date: 2001     Years since quittin.8     Smokeless tobacco: Never     Tobacco comments:     quit 2001   Substance Use Topics     Alcohol use: No     Alcohol/week: 0.0 standard drinks       ROS  Review of systems negative except as stated above.    OBJECTIVE:  BP (!) 148/87 (BP Location: Right arm, Patient Position: Sitting, Cuff Size: Adult Regular)   Pulse 76   Temp 98.1  F (36.7  C) (Tympanic)   SpO2 97%   GENERAL APPEARANCE: healthy, alert and no distress  EYES: EOMI,  PERRL, conjunctiva clear  HENT: ear canals and TM's normal.  Nose and mouth without ulcers, erythema or lesions  NECK: supple, nontender, no lymphadenopathy  RESP: lungs clear to auscultation - no rales, rhonchi or wheezes  CV: regular rates and rhythm, normal S1 S2, no  murmur noted  PSYCH: mentation appears normal      Kristin Ryder MD

## 2023-03-03 DIAGNOSIS — I25.10 CORONARY ARTERY DISEASE INVOLVING NATIVE CORONARY ARTERY OF NATIVE HEART WITHOUT ANGINA PECTORIS: ICD-10-CM

## 2023-03-03 DIAGNOSIS — I10 BENIGN ESSENTIAL HYPERTENSION: ICD-10-CM

## 2023-03-03 RX ORDER — METOPROLOL SUCCINATE 25 MG/1
25 TABLET, EXTENDED RELEASE ORAL DAILY
Qty: 90 TABLET | Refills: 0 | Status: SHIPPED | OUTPATIENT
Start: 2023-03-03 | End: 2023-05-12

## 2023-03-06 DIAGNOSIS — I25.10 CORONARY ARTERY DISEASE INVOLVING NATIVE CORONARY ARTERY OF NATIVE HEART WITHOUT ANGINA PECTORIS: ICD-10-CM

## 2023-03-06 DIAGNOSIS — I10 BENIGN ESSENTIAL HYPERTENSION: ICD-10-CM

## 2023-03-06 DIAGNOSIS — E78.00 PURE HYPERCHOLESTEROLEMIA: ICD-10-CM

## 2023-03-06 RX ORDER — LISINOPRIL 5 MG/1
5 TABLET ORAL DAILY
Qty: 90 TABLET | Refills: 0 | Status: SHIPPED | OUTPATIENT
Start: 2023-03-06 | End: 2023-05-12

## 2023-03-06 RX ORDER — CLOPIDOGREL BISULFATE 75 MG/1
TABLET ORAL
Qty: 90 TABLET | Refills: 0 | Status: SHIPPED | OUTPATIENT
Start: 2023-03-06 | End: 2023-05-12

## 2023-03-06 RX ORDER — EZETIMIBE AND SIMVASTATIN 10; 40 MG/1; MG/1
1 TABLET ORAL AT BEDTIME
Qty: 90 TABLET | Refills: 0 | Status: SHIPPED | OUTPATIENT
Start: 2023-03-06 | End: 2023-05-12

## 2023-03-06 NOTE — TELEPHONE ENCOUNTER
Received refill request for:  Plavix, Vytorin, Lisinopril  [facility] Refill Guideline reviewed.  Medication meets criteria for refill.    Mary Kay Cope RN, BSN  03/06/23 at 9:02 AM

## 2023-03-21 ENCOUNTER — TELEPHONE (OUTPATIENT)
Dept: FAMILY MEDICINE | Facility: CLINIC | Age: 68
End: 2023-03-21
Payer: MEDICARE

## 2023-03-21 DIAGNOSIS — F41.8 SITUATIONAL ANXIETY: ICD-10-CM

## 2023-03-21 RX ORDER — DIAZEPAM 5 MG
5 TABLET ORAL EVERY 6 HOURS PRN
Qty: 4 TABLET | Refills: 0 | Status: SHIPPED | OUTPATIENT
Start: 2023-03-21 | End: 2023-09-11

## 2023-03-21 NOTE — TELEPHONE ENCOUNTER
Patient called requesting Valium for his upcoming trip. He was last prescribed 4 Valium in September 22 for his last trip, and he is requesting 4 again because he will be taking 4 flights. Patient leaves on April 10th, and he asks that any prescription is sent to the [facility].    Suzie Heller

## 2023-05-05 ENCOUNTER — LAB (OUTPATIENT)
Dept: LAB | Facility: CLINIC | Age: 68
End: 2023-05-05
Payer: MEDICARE

## 2023-05-05 DIAGNOSIS — I10 BENIGN ESSENTIAL HYPERTENSION: ICD-10-CM

## 2023-05-05 DIAGNOSIS — I25.10 CORONARY ARTERY DISEASE INVOLVING NATIVE CORONARY ARTERY OF NATIVE HEART WITHOUT ANGINA PECTORIS: ICD-10-CM

## 2023-05-05 DIAGNOSIS — E78.00 PURE HYPERCHOLESTEROLEMIA: ICD-10-CM

## 2023-05-05 LAB
ALT SERPL W P-5'-P-CCNC: 35 U/L (ref 10–50)
ANION GAP SERPL CALCULATED.3IONS-SCNC: 7 MMOL/L (ref 7–15)
BUN SERPL-MCNC: 14.6 MG/DL (ref 8–23)
CALCIUM SERPL-MCNC: 9.3 MG/DL (ref 8.8–10.2)
CHLORIDE SERPL-SCNC: 105 MMOL/L (ref 98–107)
CHOLEST SERPL-MCNC: 146 MG/DL
CREAT SERPL-MCNC: 0.73 MG/DL (ref 0.67–1.17)
DEPRECATED HCO3 PLAS-SCNC: 27 MMOL/L (ref 22–29)
GFR SERPL CREATININE-BSD FRML MDRD: >90 ML/MIN/1.73M2
GLUCOSE SERPL-MCNC: 110 MG/DL (ref 70–99)
HDLC SERPL-MCNC: 37 MG/DL
LDLC SERPL CALC-MCNC: 82 MG/DL
NONHDLC SERPL-MCNC: 109 MG/DL
POTASSIUM SERPL-SCNC: 4.3 MMOL/L (ref 3.4–5.3)
SODIUM SERPL-SCNC: 139 MMOL/L (ref 136–145)
TRIGL SERPL-MCNC: 137 MG/DL

## 2023-05-05 PROCEDURE — 80048 BASIC METABOLIC PNL TOTAL CA: CPT | Performed by: INTERNAL MEDICINE

## 2023-05-05 PROCEDURE — 36415 COLL VENOUS BLD VENIPUNCTURE: CPT | Performed by: INTERNAL MEDICINE

## 2023-05-05 PROCEDURE — 84460 ALANINE AMINO (ALT) (SGPT): CPT | Performed by: INTERNAL MEDICINE

## 2023-05-05 PROCEDURE — 80061 LIPID PANEL: CPT | Performed by: INTERNAL MEDICINE

## 2023-05-12 ENCOUNTER — OFFICE VISIT (OUTPATIENT)
Dept: CARDIOLOGY | Facility: CLINIC | Age: 68
End: 2023-05-12
Attending: INTERNAL MEDICINE
Payer: MEDICARE

## 2023-05-12 VITALS
OXYGEN SATURATION: 96 % | WEIGHT: 158 LBS | BODY MASS INDEX: 25.39 KG/M2 | HEIGHT: 66 IN | HEART RATE: 68 BPM | SYSTOLIC BLOOD PRESSURE: 92 MMHG | DIASTOLIC BLOOD PRESSURE: 62 MMHG

## 2023-05-12 DIAGNOSIS — E78.00 PURE HYPERCHOLESTEROLEMIA: ICD-10-CM

## 2023-05-12 DIAGNOSIS — I10 BENIGN ESSENTIAL HYPERTENSION: ICD-10-CM

## 2023-05-12 DIAGNOSIS — I25.10 CORONARY ARTERY DISEASE INVOLVING NATIVE CORONARY ARTERY OF NATIVE HEART WITHOUT ANGINA PECTORIS: ICD-10-CM

## 2023-05-12 PROCEDURE — 99214 OFFICE O/P EST MOD 30 MIN: CPT | Performed by: INTERNAL MEDICINE

## 2023-05-12 RX ORDER — METOPROLOL SUCCINATE 25 MG/1
25 TABLET, EXTENDED RELEASE ORAL DAILY
Qty: 90 TABLET | Refills: 3 | Status: SHIPPED | OUTPATIENT
Start: 2023-05-12 | End: 2024-05-29

## 2023-05-12 RX ORDER — CLOPIDOGREL BISULFATE 75 MG/1
TABLET ORAL
Qty: 90 TABLET | Refills: 3 | Status: SHIPPED | OUTPATIENT
Start: 2023-05-12 | End: 2024-05-29

## 2023-05-12 RX ORDER — EZETIMIBE AND SIMVASTATIN 10; 40 MG/1; MG/1
1 TABLET ORAL AT BEDTIME
Qty: 90 TABLET | Refills: 3 | Status: SHIPPED | OUTPATIENT
Start: 2023-05-12 | End: 2024-05-29

## 2023-05-12 RX ORDER — LISINOPRIL 5 MG/1
5 TABLET ORAL DAILY
Qty: 90 TABLET | Refills: 3 | Status: SHIPPED | OUTPATIENT
Start: 2023-05-12 | End: 2024-05-29

## 2023-05-12 NOTE — LETTER
5/12/2023    Tommie King MD  [address] [facility] [address]    RE: Luis Miguel Barnett       Dear Colleague,     I had the pleasure of seeing Luis [facility].  HPI and Plan:   See dictation    Today's clinic visit entailed:    32 minutes spent by me on the date of the encounter doing chart review, history and exam, documentation and further activities per the note  Provider  Link to MDM Help Grid           No orders of the defined types were placed in this encounter.      Orders Placed This Encounter   Medications    metoprolol succinate ER (TOPROL XL) 25 MG 24 hr tablet     Sig: Take 1 tablet (25 mg) by mouth daily Appointment needed for further refills     Dispense:  90 tablet     Refill:  3    clopidogrel (PLAVIX) 75 MG tablet     Sig: Take one tablet (75 mg) by mouth daily     Dispense:  90 tablet     Refill:  3    ezetimibe-simvastatin (VYTORIN) 10-40 MG tablet     Sig: Take 1 tablet by mouth At Bedtime     Dispense:  90 tablet     Refill:  3    lisinopril (ZESTRIL) 5 MG tablet     Sig: Take 1 tablet (5 mg) by mouth daily     Dispense:  90 tablet     Refill:  3       Medications Discontinued During This Encounter   Medication Reason    guaiFENesin-codeine (ROBITUSSIN AC) 100-10 MG/5ML solution     guaiFENesin-codeine (ROBITUSSIN AC) 100-10 MG/5ML solution     metoprolol succinate ER (TOPROL XL) 25 MG 24 hr tablet     clopidogrel (PLAVIX) 75 MG tablet     ezetimibe-simvastatin (VYTORIN) 10-40 MG tablet     lisinopril (ZESTRIL) 5 MG tablet          Encounter Diagnoses   Name Primary?    Coronary artery disease involving native coronary artery of native heart without angina pectoris     Pure hypercholesterolemia     Benign essential hypertension        CURRENT MEDICATIONS:  Current Outpatient Medications   Medication Sig Dispense Refill    ascorbic acid (VITAMIN C) 500 MG tablet Take by mouth daily      ASPIRIN 81 MG OR TABS ONE DAILY 100 3    benzonatate  (TESSALON) 100 MG capsule Take 1 capsule (100 mg) by mouth 3 times daily as needed for cough 30 capsule 0    clopidogrel (PLAVIX) 75 MG tablet Take one tablet (75 mg) by mouth daily 90 tablet 3    diazepam (VALIUM) 5 MG tablet Take 1 tablet (5 mg) by mouth every 6 hours as needed for anxiety 4 tablet 0    ezetimibe-simvastatin (VYTORIN) 10-40 MG tablet Take 1 tablet by mouth At Bedtime 90 tablet 3    lisinopril (ZESTRIL) 5 MG tablet Take 1 tablet (5 mg) by mouth daily 90 tablet 3    metoprolol succinate ER (TOPROL XL) 25 MG 24 hr tablet Take 1 tablet (25 mg) by mouth daily Appointment needed for further refills 90 tablet 3    Multiple Vitamin (MULTIVITAMINS PO) Take 1 tablet by mouth daily      nitroGLYcerin (NITROSTAT) 0.4 MG sublingual tablet Place 1 tablet (0.4 mg) under the tongue every 5 minutes as needed for chest pain 25 tablet 1    Pseudoeph-Doxylamine-DM-APAP (NYQUIL PO)       Throat Lozenges (COUGH DROPS MT) Take by mouth as needed       UNABLE TO FIND Pure ZZZ         ALLERGIES   No Known Allergies    PAST MEDICAL HISTORY:  Past Medical History:   Diagnosis Date    Antiplatelet or antithrombotic long-term use     Coronary artery disease 2007    multivessel stenting - JULIETA mid LAD x2 and JULIETA 1st diagonal, 2008 complete obstruction 1st diagonal with successful thrombectomy and JULIETA, 2013 in stent restenosis mid LAD (25%), prox to LAD stent had in segment restenosis of 50%, 50-70% in stent restenosis diagonal    Hypertension     Presbyopia     Pure hypercholesterolemia     Stented coronary artery     Unspecified cardiovascular disease 6/07, 11/08       PAST SURGICAL HISTORY:  Past Surgical History:   Procedure Laterality Date    HEART CATH, ANGIOPLASTY      multivessel stenting - JULIETA mid LAD x2 and JULIETA 1st diagonal, 2008 complete obstruction 1st diagonal with successful thrombectomy and JULIETA, 2013 in stent restenosis mid LAD (25%), prox to LAD stent had in segment restenosis of 50%, 50-70% in stent restenosis  diagonal    HERNIORRHAPHY INGUINAL  2012    Procedure: HERNIORRHAPHY INGUINAL;  left inguinal hernia repair with Mesh ;  Surgeon: Cam Lancaster MD;  Location: [facility] OR    SURGICAL HISTORY OF -       s/p vasectomy     SURGICAL HISTORY OF -   , , 2/10    stent x 2 - LAD/Diagonal - Dr Gastelum - stent  - mid LAD    SURGICAL HISTORY OF -   10/10    stress echo normal       FAMILY HISTORY:  Family History   Problem Relation Age of Onset    Alzheimer Disease Mother     Heart Disease Father         MI    C.A.D. Brother 48        Stents    Family History Negative Other        SOCIAL HISTORY:  Social History     Socioeconomic History    Marital status:      Spouse name: None    Number of children: None    Years of education: None    Highest education level: None   Tobacco Use    Smoking status: Former     Packs/day: 1.00     Years: 30.00     Pack years: 30.00     Types: Cigarettes     Start date:      Quit date: 2001     Years since quittin.1    Smokeless tobacco: Never    Tobacco comments:     quit 2001   Substance and Sexual Activity    Alcohol use: No     Alcohol/week: 0.0 standard drinks of alcohol    Drug use: No    Sexual activity: Yes   Other Topics Concern    Parent/sibling w/ CABG, MI or angioplasty before 65F 55M? Yes    Caffeine Concern Yes     Comment: 1 soda a day    Sleep Concern No     Comment: does not sleep well some nights    Stress Concern No    Special Diet No    Exercise Yes     Comment: Walking 2 days per week, working PT    Seat Belt Yes       Review of Systems:  Skin:          Eyes:         ENT:         Respiratory:  Negative       Cardiovascular:  Negative      Gastroenterology:        Genitourinary:         Musculoskeletal:         Neurologic:         Psychiatric:         Heme/Lymph/Imm:         Endocrine:           Physical Exam:  Vitals: BP 92/62 (BP Location: Right arm, Patient Position: Sitting, Cuff Size: Adult Regular)   Pulse 68   Ht 1.676 m (5'  "6\")   Wt 71.7 kg (158 lb)   SpO2 96%   BMI 25.50 kg/m      Constitutional:  cooperative, alert and oriented, well developed, well nourished, in no acute distress        Skin:  warm and dry to the touch          Head:  normocephalic, no masses or lesions        Eyes:  pupils equal and round        Lymph:      ENT:  no pallor or cyanosis, dentition good        Neck:  carotid pulses are full and equal bilaterally        Respiratory:  clear to auscultation;normal symmetry         Cardiac: regular rhythm;no murmurs, gallops or rubs detected                pulses full and equal, no bruits auscultated                                        GI:  abdomen soft;no bruits        Extremities and Muscular Skeletal:  no deformities, clubbing, cyanosis, erythema observed              Neurological:  no gross motor deficits        Psych:  Alert and Oriented x 3        CC  Saniya Ariadna Bustillo DO  [address]  [place] [address]                Service Date: 05/12/2023    CLINIC FOLLOWUP VISIT     REFERRING PHYSICIAN:  Tommie King MD     HISTORY OF PRESENT ILLNESS:  Mr. Barnett is a pleasant, 67-year-old male with a history of coronary artery disease.  He has had revascularization of his left anterior descending artery and diagonal in 2008 and in 2013.  He is here for a followup visit.  In the last year, he has not had any major health issues.  He is traveling quite a bit.  He does exercise.  He is not experiencing any chest pain or shortness of breath.  We did ask him to undergo some blood work prior to the appointment.  He has been having trouble with borderline high blood sugars and hypertriglyceridemia.  His cholesterol numbers look improved a little bit this year. His triglycerides are now normal at 137 down from 249, and his LDL went up a bit from 62-82, and his HDL went down a little bit from 41-37.  He is maintained on Vytorin___ 40 mg of simvastatin.  His basic metabolic panel looked normal, including his " kidney function, and his liver function tests were normal.    PHYSICAL EXAMINATION:    VITAL SIGNS:  Today his blood pressure is 92/62. Pulse is 68. Weight is 158, body mass index of 25.  NECK:  Carotid upstrokes are brisk without bruit.  CARDIOVASCULAR:  Cardiovascular tones are regular without murmur, gallop or rub.  RESPIRATORY:  Lung fields are clear.  EXTREMITIES:  He has no peripheral edema.    In summary, Mr. Rogers is a pleasant, 67-year-old male with coronary artery disease, remote stenting to his LAD and diagonal in .  Repeat revascularization in  due to in-stent restenosis.  His last ischemic testing was last year with a stress echo, which was negative with moderate to high cardiac workload and no symptoms.  He remains asymptomatic.  I will recommend to continue his current medical management.  I did renew his cardiac medications for him today.  Next year, we will plan on repeating a stress echocardiogram.    Please feel free to contact me with any questions you have in regard to his care.    Saniya Bustillo DO    cc:  [place] [address]    Saniya Bustillo DO        D: 2023   T: 2023   MT: elvira    Name:     STEPHANE ROGERS  MRN:      [id number]        Account:      [id number]   :      1955           Service Date: 2023       Document: [id number]     Thank you for allowing me to participate in the care of your patient.      Sincerely,     DO NADEGE [facility]  cc:   Saniya Bustillo DO  [address] [place] [address]

## 2023-05-12 NOTE — PROGRESS NOTES
Service Date: 05/12/2023    CLINIC FOLLOWUP VISIT     REFERRING PHYSICIAN:  Tommie King MD     HISTORY OF PRESENT ILLNESS:  Mr. Barnett is a pleasant, 67-year-old male with a history of coronary artery disease.  He has had revascularization of his left anterior descending artery and diagonal in 2008 and in 2013.  He is here for a followup visit.  In the last year, he has not had any major health issues.  He is traveling quite a bit.  He does exercise.  He is not experiencing any chest pain or shortness of breath.  We did ask him to undergo some blood work prior to the appointment.  He has been having trouble with borderline high blood sugars and hypertriglyceridemia.  His cholesterol numbers look improved a little bit this year. His triglycerides are now normal at 137 down from 249, and his LDL went up a bit from 62-82, and his HDL went down a little bit from 41-37.  He is maintained on Vytorin 40 mg of simvastatin.  His basic metabolic panel looked normal, including his kidney function, and his liver function tests were normal.    PHYSICAL EXAMINATION:    VITAL SIGNS:  Today his blood pressure is 92/62. Pulse is 68. Weight is 158, body mass index of 25.  NECK:  Carotid upstrokes are brisk without bruit.  CARDIOVASCULAR:  Cardiovascular tones are regular without murmur, gallop or rub.  RESPIRATORY:  Lung fields are clear.  EXTREMITIES:  He has no peripheral edema.    In summary, Mr. Barnett is a pleasant, 67-year-old male with coronary artery disease, remote stenting to his LAD and diagonal in 2008.  Repeat revascularization in 2013 due to in-stent restenosis.  His last ischemic testing was last year with a stress echo, which was negative with moderate to high cardiac workload and no symptoms.  He remains asymptomatic.  I will recommend to continue his current medical management.  I did renew his cardiac medications for him today.  Next year, we will plan on repeating a stress echocardiogram.    Please feel free to  contact me with any questions you have in regard to his care.    Saniya Bustillo DO    cc:  [place] [address]    Saniya Bustillo DO        D: 2023   T: 2023   MT: elvira    Name:     STEPHANE ROGERSNick  MRN:      [id number]        Account:      [id number]   :      1955           Service Date: 2023       Document: [id number]

## 2023-05-12 NOTE — PROGRESS NOTES
HPI and Plan:   See dictation    Today's clinic visit entailed:    32 minutes spent by me on the date of the encounter doing chart review, history and exam, documentation and further activities per the note  Provider  Link to MDM Help Grid           No orders of the defined types were placed in this encounter.      Orders Placed This Encounter   Medications     metoprolol succinate ER (TOPROL XL) 25 MG 24 hr tablet     Sig: Take 1 tablet (25 mg) by mouth daily Appointment needed for further refills     Dispense:  90 tablet     Refill:  3     clopidogrel (PLAVIX) 75 MG tablet     Sig: Take one tablet (75 mg) by mouth daily     Dispense:  90 tablet     Refill:  3     ezetimibe-simvastatin (VYTORIN) 10-40 MG tablet     Sig: Take 1 tablet by mouth At Bedtime     Dispense:  90 tablet     Refill:  3     lisinopril (ZESTRIL) 5 MG tablet     Sig: Take 1 tablet (5 mg) by mouth daily     Dispense:  90 tablet     Refill:  3       Medications Discontinued During This Encounter   Medication Reason     guaiFENesin-codeine (ROBITUSSIN AC) 100-10 MG/5ML solution      guaiFENesin-codeine (ROBITUSSIN AC) 100-10 MG/5ML solution      metoprolol succinate ER (TOPROL XL) 25 MG 24 hr tablet      clopidogrel (PLAVIX) 75 MG tablet      ezetimibe-simvastatin (VYTORIN) 10-40 MG tablet      lisinopril (ZESTRIL) 5 MG tablet          Encounter Diagnoses   Name Primary?     Coronary artery disease involving native coronary artery of native heart without angina pectoris      Pure hypercholesterolemia      Benign essential hypertension        CURRENT MEDICATIONS:  Current Outpatient Medications   Medication Sig Dispense Refill     ascorbic acid (VITAMIN C) 500 MG tablet Take by mouth daily       ASPIRIN 81 MG OR TABS ONE DAILY 100 3     benzonatate (TESSALON) 100 MG capsule Take 1 capsule (100 mg) by mouth 3 times daily as needed for cough 30 capsule 0     clopidogrel (PLAVIX) 75 MG tablet Take one tablet (75 mg) by mouth daily 90 tablet 3      diazepam (VALIUM) 5 MG tablet Take 1 tablet (5 mg) by mouth every 6 hours as needed for anxiety 4 tablet 0     ezetimibe-simvastatin (VYTORIN) 10-40 MG tablet Take 1 tablet by mouth At Bedtime 90 tablet 3     lisinopril (ZESTRIL) 5 MG tablet Take 1 tablet (5 mg) by mouth daily 90 tablet 3     metoprolol succinate ER (TOPROL XL) 25 MG 24 hr tablet Take 1 tablet (25 mg) by mouth daily Appointment needed for further refills 90 tablet 3     Multiple Vitamin (MULTIVITAMINS PO) Take 1 tablet by mouth daily       nitroGLYcerin (NITROSTAT) 0.4 MG sublingual tablet Place 1 tablet (0.4 mg) under the tongue every 5 minutes as needed for chest pain 25 tablet 1     Pseudoeph-Doxylamine-DM-APAP (NYQUIL PO)        Throat Lozenges (COUGH DROPS MT) Take by mouth as needed        UNABLE TO FIND Pure ZZZ         ALLERGIES   No Known Allergies    PAST MEDICAL HISTORY:  Past Medical History:   Diagnosis Date     Antiplatelet or antithrombotic long-term use      Coronary artery disease 2007    multivessel stenting - JULIETA mid LAD x2 and JULIETA 1st diagonal, 2008 complete obstruction 1st diagonal with successful thrombectomy and JULIETA, 2013 in stent restenosis mid LAD (25%), prox to LAD stent had in segment restenosis of 50%, 50-70% in stent restenosis diagonal     Hypertension      Presbyopia      Pure hypercholesterolemia      Stented coronary artery      Unspecified cardiovascular disease 6/07, 11/08       PAST SURGICAL HISTORY:  Past Surgical History:   Procedure Laterality Date     HEART CATH, ANGIOPLASTY      multivessel stenting - JULIETA mid LAD x2 and JULIETA 1st diagonal, 2008 complete obstruction 1st diagonal with successful thrombectomy and JULIETA, 2013 in stent restenosis mid LAD (25%), prox to LAD stent had in segment restenosis of 50%, 50-70% in stent restenosis diagonal     HERNIORRHAPHY INGUINAL  11/16/2012    Procedure: HERNIORRHAPHY INGUINAL;  left inguinal hernia repair with Mesh ;  Surgeon: Cam Lancaster MD;  Location:  OR      "SURGICAL HISTORY OF -       s/p vasectomy      SURGICAL HISTORY OF -   , , 2/10    stent x 2 - LAD/Diagonal - Dr Gastelum - stent  - mid LAD     SURGICAL HISTORY OF -   10/10    stress echo normal       FAMILY HISTORY:  Family History   Problem Relation Age of Onset     Alzheimer Disease Mother      Heart Disease Father         MI     C.A.D. Brother 48        Stents     Family History Negative Other        SOCIAL HISTORY:  Social History     Socioeconomic History     Marital status:      Spouse name: None     Number of children: None     Years of education: None     Highest education level: None   Tobacco Use     Smoking status: Former     Packs/day: 1.00     Years: 30.00     Pack years: 30.00     Types: Cigarettes     Start date:      Quit date: 2001     Years since quittin.1     Smokeless tobacco: Never     Tobacco comments:     quit 2001   Substance and Sexual Activity     Alcohol use: No     Alcohol/week: 0.0 standard drinks of alcohol     Drug use: No     Sexual activity: Yes   Other Topics Concern     Parent/sibling w/ CABG, MI or angioplasty before 65F 55M? Yes     Caffeine Concern Yes     Comment: 1 soda a day     Sleep Concern No     Comment: does not sleep well some nights     Stress Concern No     Special Diet No     Exercise Yes     Comment: Walking 2 days per week, working PT     Seat Belt Yes       Review of Systems:  Skin:          Eyes:         ENT:         Respiratory:  Negative       Cardiovascular:  Negative      Gastroenterology:        Genitourinary:         Musculoskeletal:         Neurologic:         Psychiatric:         Heme/Lymph/Imm:         Endocrine:           Physical Exam:  Vitals: BP 92/62 (BP Location: Right arm, Patient Position: Sitting, Cuff Size: Adult Regular)   Pulse 68   Ht 1.676 m (5' 6\")   Wt 71.7 kg (158 lb)   SpO2 96%   BMI 25.50 kg/m      Constitutional:  cooperative, alert and oriented, well developed, well nourished, in no acute " distress        Skin:  warm and dry to the touch          Head:  normocephalic, no masses or lesions        Eyes:  pupils equal and round        Lymph:      ENT:  no pallor or cyanosis, dentition good        Neck:  carotid pulses are full and equal bilaterally        Respiratory:  clear to auscultation;normal symmetry         Cardiac: regular rhythm;no murmurs, gallops or rubs detected                pulses full and equal, no bruits auscultated                                        GI:  abdomen soft;no bruits        Extremities and Muscular Skeletal:  no deformities, clubbing, cyanosis, erythema observed              Neurological:  no gross motor deficits        Psych:  Alert and Oriented x 3        CC  Saniya Bustillo, DO  [address]  [place] [address]

## 2023-08-16 DIAGNOSIS — I25.10 CORONARY ARTERY DISEASE INVOLVING NATIVE CORONARY ARTERY OF NATIVE HEART WITHOUT ANGINA PECTORIS: ICD-10-CM

## 2023-08-16 RX ORDER — NITROGLYCERIN 0.4 MG/1
0.4 TABLET SUBLINGUAL EVERY 5 MIN PRN
Qty: 25 TABLET | Refills: 1 | Status: CANCELLED | OUTPATIENT
Start: 2023-08-16

## 2023-08-17 DIAGNOSIS — I25.10 CORONARY ARTERY DISEASE INVOLVING NATIVE CORONARY ARTERY OF NATIVE HEART WITHOUT ANGINA PECTORIS: ICD-10-CM

## 2023-08-17 RX ORDER — NITROGLYCERIN 0.4 MG/1
0.4 TABLET SUBLINGUAL EVERY 5 MIN PRN
Qty: 25 TABLET | Refills: 2 | Status: SHIPPED | OUTPATIENT
Start: 2023-08-17

## 2023-09-11 DIAGNOSIS — F41.8 SITUATIONAL ANXIETY: ICD-10-CM

## 2023-09-11 RX ORDER — DIAZEPAM 5 MG
5 TABLET ORAL EVERY 6 HOURS PRN
Qty: 4 TABLET | Refills: 0 | Status: SHIPPED | OUTPATIENT
Start: 2023-09-11 | End: 2024-03-21

## 2023-09-11 NOTE — TELEPHONE ENCOUNTER
Patient is calling to request a refill on his valium due to anxiety on the airplane. Patient reports Dr. King has prescribed this rx before. Patient will be flying out on a nonstop flight on 9/26/23. Please advise refill. Pharmacy desired attached.       Please call patient once rx is sent.

## 2023-09-12 NOTE — TELEPHONE ENCOUNTER
Patient will call and schedule appointment after he gets back from his trip. 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 He still wants Dr. King to be his primary and may have them send the information over.     Ariadna Manzanares, [facility]

## 2023-12-01 ENCOUNTER — TRANSFERRED RECORDS (OUTPATIENT)
Dept: HEALTH INFORMATION MANAGEMENT | Facility: CLINIC | Age: 68
End: 2023-12-01
Payer: MEDICARE

## 2024-03-21 DIAGNOSIS — F41.8 SITUATIONAL ANXIETY: ICD-10-CM

## 2024-03-21 RX ORDER — DIAZEPAM 5 MG
5 TABLET ORAL EVERY 6 HOURS PRN
Qty: 4 TABLET | Refills: 0 | Status: SHIPPED | OUTPATIENT
Start: 2024-03-21

## 2024-03-21 NOTE — TELEPHONE ENCOUNTER
I have not seen him here since 2021 as a patient.  Please schedule preventive visit in the next 3 to 6 months.

## 2024-03-21 NOTE — TELEPHONE ENCOUNTER
Medication Question or Refill    diazepam (VALIUM) 5 MG tablet    What medication are you calling about (include dose and sig)?: Pt traveling again and requesting more diazepam (VALIUM) 5 MG tablet     Preferred Pharmacy:     [place], [place] - [address]  [address] [address]  Phone: [phone] Fax: [phone] Alternate Fax: [phone], [phone]      Controlled Substance Agreement on file:   CSA -- Patient Level:    CSA: None found at the patient level.       Who prescribed the medication?: Fernando    Do you need a refill? Yes    When did you use the medication last? 09/23    Patient offered an appointment? No    Do you have any questions or concerns?  No      Okay to leave a detailed message?: Yes at Cell number on file:    Telephone Information:   Mobile [phone]     [facility]

## 2024-03-22 NOTE — TELEPHONE ENCOUNTER
Placed call to patient to advise of Dr. King's message below. 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 Will try to have this sent to the clinic. Patient will schedule appointment when he gets back from trip. Closing encounter.

## 2024-05-09 ENCOUNTER — HOSPITAL ENCOUNTER (OUTPATIENT)
Dept: CARDIOLOGY | Facility: CLINIC | Age: 69
Discharge: HOME | End: 2024-05-09
Attending: INTERNAL MEDICINE | Admitting: INTERNAL MEDICINE
Payer: MEDICARE

## 2024-05-09 ENCOUNTER — TELEPHONE (OUTPATIENT)
Dept: CARDIOLOGY | Facility: CLINIC | Age: 69
End: 2024-05-09

## 2024-05-09 DIAGNOSIS — I10 BENIGN ESSENTIAL HYPERTENSION: ICD-10-CM

## 2024-05-09 DIAGNOSIS — R93.1 ABNORMAL FINDINGS ON DIAGNOSTIC IMAGING OF HEART AND CORONARY CIRCULATION: Primary | ICD-10-CM

## 2024-05-09 DIAGNOSIS — I25.10 CORONARY ARTERY DISEASE INVOLVING NATIVE CORONARY ARTERY OF NATIVE HEART WITHOUT ANGINA PECTORIS: ICD-10-CM

## 2024-05-09 DIAGNOSIS — E78.00 PURE HYPERCHOLESTEROLEMIA: ICD-10-CM

## 2024-05-09 PROCEDURE — 93321 DOPPLER ECHO F-UP/LMTD STD: CPT | Mod: 26 | Performed by: INTERNAL MEDICINE

## 2024-05-09 PROCEDURE — 93325 DOPPLER ECHO COLOR FLOW MAPG: CPT | Mod: 26 | Performed by: INTERNAL MEDICINE

## 2024-05-09 PROCEDURE — 255N000002 HC RX 255 OP 636: Performed by: INTERNAL MEDICINE

## 2024-05-09 PROCEDURE — 93018 CV STRESS TEST I&R ONLY: CPT | Performed by: INTERNAL MEDICINE

## 2024-05-09 PROCEDURE — 999N000208 ECHO STRESS ECHOCARDIOGRAM

## 2024-05-09 PROCEDURE — 93350 STRESS TTE ONLY: CPT | Mod: 26 | Performed by: INTERNAL MEDICINE

## 2024-05-09 PROCEDURE — 93016 CV STRESS TEST SUPVJ ONLY: CPT | Performed by: INTERNAL MEDICINE

## 2024-05-09 RX ADMIN — HUMAN ALBUMIN MICROSPHERES AND PERFLUTREN 3 ML: 10; .22 INJECTION, SOLUTION INTRAVENOUS at 10:19

## 2024-05-09 NOTE — TELEPHONE ENCOUNTER
Received message from Dr. Bustillo regarding results of pt's stress echo:  Interpretation Summary  Abnormal stress echo  stress induced hypokinesis of basal to mid septum suggesting LAD/D1 disease.  Recommend angiogram at [facility] to be scheduled.  Patient contacted.  ______________________________________________________________________________  Stress  The patient exercised 10:00.  RPP 90530.  This study was stopped as the patient achieved an adequate exercise effort for  a diagnostic study.  The patient exhibited no chest pain during exercise.  There was a normal BP response to exercise.  Target Heart Rate was achieved.  The Duke treadmill score was intermediate risk ( -11< Rivera score <5).  There was a maximum 2.0mm ST segment depression in the inferior lead(s).  There was a maximum 2.0mm ST segment depression in the lateral lead(s).  This was an abnormal stress EKG.  This was an abnormal stress echocardiogram with an indeterminate risk.  The visual ejection fraction is 65-70%.  Normal rest wall motion with stress-induced wall motion abnormalities  consistent with ischemia in the brandon-septal wall(s).  ----------------------------------------------------------------------------------    Dr. Bustillo is wanting pt to have coronary angiogram with Dr. Davidson in [place] in the next few weeks and to have a clinic visit with her prior.   Spoke with patient about this and he gave verbal understanding.

## 2024-05-17 ENCOUNTER — OFFICE VISIT (OUTPATIENT)
Dept: CARDIOLOGY | Facility: CLINIC | Age: 69
End: 2024-05-17
Payer: MEDICARE

## 2024-05-17 VITALS
SYSTOLIC BLOOD PRESSURE: 119 MMHG | HEART RATE: 67 BPM | WEIGHT: 156 LBS | BODY MASS INDEX: 24.48 KG/M2 | DIASTOLIC BLOOD PRESSURE: 75 MMHG | HEIGHT: 67 IN

## 2024-05-17 DIAGNOSIS — R93.1 ABNORMAL FINDINGS ON DIAGNOSTIC IMAGING OF HEART AND CORONARY CIRCULATION: ICD-10-CM

## 2024-05-17 DIAGNOSIS — I25.10 CORONARY ARTERY DISEASE INVOLVING NATIVE CORONARY ARTERY OF NATIVE HEART WITHOUT ANGINA PECTORIS: Primary | ICD-10-CM

## 2024-05-17 DIAGNOSIS — E78.00 PURE HYPERCHOLESTEROLEMIA: ICD-10-CM

## 2024-05-17 PROCEDURE — 99214 OFFICE O/P EST MOD 30 MIN: CPT | Performed by: INTERNAL MEDICINE

## 2024-05-17 NOTE — LETTER
5/17/2024    Tommie King MD  [address] [facility] [address]    RE: Lius Miguel Barnett       Dear Colleague,     I had the pleasure of seeing Luis Miguel Barnett in the [facility].  HPI and Plan:   Luis Miguel Barnett is a 68 year old male who presents with history of coronary artery disease and PCI to his LAD and diagonal in 2008 in 2013.  He had a routine stress echocardiogram on May 9 prior to his annual follow-up visit.  This year the stress test did suggest mild hypokinesis of the basal septum suggesting possible recurrent LAD or diagonal disease.  He is here today to discuss coronary angiogram for further evaluation.  He was not having any chest pain symptoms or shortness of breath prior to the stress test.  He has been traveling quite a bit and has several trips coming up.  Last labs were done in May of last year, he is due for repeat cholesterol.  Last year his LDL was 82 had jumped up a bit from previous year and his HDL went down a little bit to 37 from 41.  Exam today is unremarkable  Summary    1.  Coronary artery disease with history of PCI to his LAD and diagonal-abnormal stress echocardiogram indicating possible progression of left coronary disease.  Discussed management with interventional cardiologist.  I would like him to undergo coronary angiography for further evaluation.Risks and benefits of left heart catheterization, aka coronary angiogram were discussed with the patient in detail. 0.1-0.3% (for diagnostic angio) and 1-2% (for PCI)  risk of stroke, MI, death, emergent bypass grafting, risk of contrast induced allergic reaction, renal dysfunction, vascular complications were discussed. Patient understands and wishes to proceed.    2.  Hyperlipidemia-recommend repeating lipid profile this year, he is on moderate intensity atorvastatin in combination with Zetia    Please feel free to contact me with any questions given regards to his care         Today's clinic visit  entailed:    35 minutes spent by me on the date of the encounter doing chart review, history and exam, documentation and further activities per the note  Provider  Link to MDM Help Grid         No orders of the defined types were placed in this encounter.    No orders of the defined types were placed in this encounter.    There are no discontinued medications.      No diagnosis found.    CURRENT MEDICATIONS:  Current Outpatient Medications   Medication Sig Dispense Refill    ascorbic acid (VITAMIN C) 500 MG tablet Take by mouth daily      ASPIRIN 81 MG OR TABS ONE DAILY 100 3    clopidogrel (PLAVIX) 75 MG tablet Take one tablet (75 mg) by mouth daily 90 tablet 3    diazepam (VALIUM) 5 MG tablet Take 1 tablet (5 mg) by mouth every 6 hours as needed for anxiety 4 tablet 0    ezetimibe-simvastatin (VYTORIN) 10-40 MG tablet Take 1 tablet by mouth At Bedtime 90 tablet 3    lisinopril (ZESTRIL) 5 MG tablet Take 1 tablet (5 mg) by mouth daily 90 tablet 3    metoprolol succinate ER (TOPROL XL) 25 MG 24 hr tablet Take 1 tablet (25 mg) by mouth daily Appointment needed for further refills 90 tablet 3    Multiple Vitamin (MULTIVITAMINS PO) Take 1 tablet by mouth daily      nitroGLYcerin (NITROSTAT) 0.4 MG sublingual tablet Place 1 tablet (0.4 mg) under the tongue every 5 minutes as needed for chest pain 25 tablet 2    Throat Lozenges (COUGH DROPS MT) Take by mouth as needed       UNABLE TO FIND Pure ZZZ      benzonatate (TESSALON) 100 MG capsule Take 1 capsule (100 mg) by mouth 3 times daily as needed for cough (Patient not taking: Reported on 5/17/2024) 30 capsule 0    Pseudoeph-Doxylamine-DM-APAP (NYQUIL PO)  (Patient not taking: Reported on 5/17/2024)         ALLERGIES   No Known Allergies    PAST MEDICAL HISTORY:  Past Medical History:   Diagnosis Date    Antiplatelet or antithrombotic long-term use     Coronary artery disease 2007    multivessel stenting - JULIETA mid LAD x2 and JULIETA 1st diagonal, 2008 complete obstruction  1st diagonal with successful thrombectomy and JULIETA,  in stent restenosis mid LAD (25%), prox to LAD stent had in segment restenosis of 50%, 50-70% in stent restenosis diagonal    Hypertension     Presbyopia     Pure hypercholesterolemia     Stented coronary artery     Unspecified cardiovascular disease ,        PAST SURGICAL HISTORY:  Past Surgical History:   Procedure Laterality Date    HEART CATH, ANGIOPLASTY      multivessel stenting - JULIETA mid LAD x2 and JULIETA 1st diagonal,  complete obstruction 1st diagonal with successful thrombectomy and JULIETA,  in stent restenosis mid LAD (25%), prox to LAD stent had in segment restenosis of 50%, 50-70% in stent restenosis diagonal    HERNIORRHAPHY INGUINAL  2012    Procedure: HERNIORRHAPHY INGUINAL;  left inguinal hernia repair with Mesh ;  Surgeon: Cam Lancaster MD;  Location: [facility] OR    SURGICAL HISTORY OF -       s/p vasectomy     SURGICAL HISTORY OF -   , , 2/10    stent x 2 - LAD/Diagonal - Dr Gastelum - stent  - mid LAD    SURGICAL HISTORY OF -   10/10    stress echo normal       FAMILY HISTORY:  Family History   Problem Relation Age of Onset    Alzheimer Disease Mother     Heart Disease Father         MI    C.A.D. Brother 48        Stents    Family History Negative Other        SOCIAL HISTORY:  Social History     Socioeconomic History    Marital status:      Spouse name: None    Number of children: None    Years of education: None    Highest education level: None   Tobacco Use    Smoking status: Former     Current packs/day: 0.00     Average packs/day: 1 pack/day for 30.2 years (30.2 ttl pk-yrs)     Types: Cigarettes     Start date:      Quit date: 2001     Years since quittin.1    Smokeless tobacco: Never    Tobacco comments:     quit 2001   Substance and Sexual Activity    Alcohol use: No     Alcohol/week: 0.0 standard drinks of alcohol    Drug use: No    Sexual activity: Yes   Other Topics Concern     "Parent/sibling w/ CABG, MI or angioplasty before 65F 55M? Yes    Caffeine Concern Yes     Comment: 1 soda a day    Sleep Concern No     Comment: does not sleep well some nights    Stress Concern No    Special Diet No    Exercise Yes     Comment: Walking 2 days per week, working PT    Seat Belt Yes       Review of Systems:  Skin:        Eyes:       ENT:       Respiratory:  Negative    Cardiovascular:  palpitations;chest pain;Negative;syncope or near-syncope;cyanosis;dizziness;lightheadedness;edema;fatigue Positive for  Gastroenterology:      Genitourinary:       Musculoskeletal:       Neurologic:       Psychiatric:       Heme/Lymph/Imm:       Endocrine:  Negative      Physical Exam:  Vitals: /75   Pulse 67   Ht 1.702 m (5' 7\")   Wt 70.8 kg (156 lb)   BMI 24.43 kg/m      Constitutional:  cooperative, alert and oriented, well developed, well nourished, in no acute distress        Skin:  warm and dry to the touch          Head:  normocephalic, no masses or lesions        Eyes:  pupils equal and round        Lymph:      ENT:  no pallor or cyanosis, dentition good        Neck:  carotid pulses are full and equal bilaterally        Respiratory:  clear to auscultation;normal symmetry         Cardiac: regular rhythm;no murmurs, gallops or rubs detected                pulses full and equal, no bruits auscultated                                        GI:  abdomen soft;no bruits        Extremities and Muscular Skeletal:  no deformities, clubbing, cyanosis, erythema observed              Neurological:  no gross motor deficits        Psych:  Alert and Oriented x 3        Recent Lab Results:  LIPID RESULTS:  Lab Results   Component Value Date    CHOL 146 05/05/2023    CHOL 152 12/10/2020    HDL 37 (L) 05/05/2023    HDL 41 12/10/2020    LDL 82 05/05/2023    LDL 68 12/10/2020    TRIG 137 05/05/2023    TRIG 217 (H) 12/10/2020    CHOLHDLRATIO 3.9 10/30/2014       LIVER ENZYME RESULTS:  Lab Results   Component Value Date    " "AST 31 06/09/2007    ALT 35 05/05/2023    ALT 52 12/10/2020       CBC RESULTS:  Lab Results   Component Value Date    WBC 8.9 06/13/2016    RBC 5.11 06/13/2016    HGB 15.9 06/13/2016    HCT 45.4 06/13/2016    MCV 89 06/13/2016    MCH 31.1 06/13/2016    MCHC 35.0 06/13/2016    RDW 12.5 06/13/2016     06/13/2016       BMP RESULTS:  Lab Results   Component Value Date     05/05/2023     12/10/2020    POTASSIUM 4.3 05/05/2023    POTASSIUM 4.1 02/23/2022    POTASSIUM 4.4 12/10/2020    CHLORIDE 105 05/05/2023    CHLORIDE 105 02/23/2022    CHLORIDE 104 12/10/2020    CO2 27 05/05/2023    CO2 31 02/23/2022    CO2 31 12/10/2020    ANIONGAP 7 05/05/2023    ANIONGAP 5 02/23/2022    ANIONGAP 5 12/10/2020     (H) 05/05/2023     (H) 02/23/2022     (H) 12/10/2020    BUN 14.6 05/05/2023    BUN 15 02/23/2022    BUN 17 12/10/2020    CR 0.73 05/05/2023    CR 0.74 12/10/2020    GFRESTIMATED >90 05/05/2023    GFRESTIMATED >90 12/10/2020    GFRESTBLACK >90 12/10/2020    PAULA 9.3 05/05/2023    PAULA 9.4 12/10/2020        A1C RESULTS:  No results found for: \"A1C\"    INR RESULTS:  Lab Results   Component Value Date    INR 0.90 07/15/2013    INR 0.92 02/22/2010           CC  No referring provider defined for this encounter.      Thank you for allowing me to participate in the care of your patient.      Sincerely,     Saniya Bustillo,      [facility]  "

## 2024-05-17 NOTE — PROGRESS NOTES
HPI and Plan:   Luis Miguel Barnett is a 68 year old male who presents with history of coronary artery disease and PCI to his LAD and diagonal in 2008 in 2013.  He had a routine stress echocardiogram on May 9 prior to his annual follow-up visit.  This year the stress test did suggest mild hypokinesis of the basal septum suggesting possible recurrent LAD or diagonal disease.  He is here today to discuss coronary angiogram for further evaluation.  He was not having any chest pain symptoms or shortness of breath prior to the stress test.  He has been traveling quite a bit and has several trips coming up.  Last labs were done in May of last year, he is due for repeat cholesterol.  Last year his LDL was 82 had jumped up a bit from previous year and his HDL went down a little bit to 37 from 41.  Exam today is unremarkable  Summary    1.  Coronary artery disease with history of PCI to his LAD and diagonal-abnormal stress echocardiogram indicating possible progression of left coronary disease.  Discussed management with interventional cardiologist.  I would like him to undergo coronary angiography for further evaluation.Risks and benefits of left heart catheterization, aka coronary angiogram were discussed with the patient in detail. 0.1-0.3% (for diagnostic angio) and 1-2% (for PCI)  risk of stroke, MI, death, emergent bypass grafting, risk of contrast induced allergic reaction, renal dysfunction, vascular complications were discussed. Patient understands and wishes to proceed.    2.  Hyperlipidemia-recommend repeating lipid profile this year, he is on moderate intensity atorvastatin in combination with Zetia    Please feel free to contact me with any questions given regards to his care         Today's clinic visit entailed:    35 minutes spent by me on the date of the encounter doing chart review, history and exam, documentation and further activities per the note  Provider  Link to [facility] Help Grid         No orders of the  defined types were placed in this encounter.    No orders of the defined types were placed in this encounter.    There are no discontinued medications.      No diagnosis found.    CURRENT MEDICATIONS:  Current Outpatient Medications   Medication Sig Dispense Refill    ascorbic acid (VITAMIN C) 500 MG tablet Take by mouth daily      ASPIRIN 81 MG OR TABS ONE DAILY 100 3    clopidogrel (PLAVIX) 75 MG tablet Take one tablet (75 mg) by mouth daily 90 tablet 3    diazepam (VALIUM) 5 MG tablet Take 1 tablet (5 mg) by mouth every 6 hours as needed for anxiety 4 tablet 0    ezetimibe-simvastatin (VYTORIN) 10-40 MG tablet Take 1 tablet by mouth At Bedtime 90 tablet 3    lisinopril (ZESTRIL) 5 MG tablet Take 1 tablet (5 mg) by mouth daily 90 tablet 3    metoprolol succinate ER (TOPROL XL) 25 MG 24 hr tablet Take 1 tablet (25 mg) by mouth daily Appointment needed for further refills 90 tablet 3    Multiple Vitamin (MULTIVITAMINS PO) Take 1 tablet by mouth daily      nitroGLYcerin (NITROSTAT) 0.4 MG sublingual tablet Place 1 tablet (0.4 mg) under the tongue every 5 minutes as needed for chest pain 25 tablet 2    Throat Lozenges (COUGH DROPS MT) Take by mouth as needed       UNABLE TO FIND Pure ZZZ      benzonatate (TESSALON) 100 MG capsule Take 1 capsule (100 mg) by mouth 3 times daily as needed for cough (Patient not taking: Reported on 5/17/2024) 30 capsule 0    Pseudoeph-Doxylamine-DM-APAP (NYQUIL PO)  (Patient not taking: Reported on 5/17/2024)         ALLERGIES   No Known Allergies    PAST MEDICAL HISTORY:  Past Medical History:   Diagnosis Date    Antiplatelet or antithrombotic long-term use     Coronary artery disease 2007    multivessel stenting - JULIETA mid LAD x2 and JULIETA 1st diagonal, 2008 complete obstruction 1st diagonal with successful thrombectomy and JULIETA, 2013 in stent restenosis mid LAD (25%), prox to LAD stent had in segment restenosis of 50%, 50-70% in stent restenosis diagonal    Hypertension     Presbyopia      Pure hypercholesterolemia     Stented coronary artery     Unspecified cardiovascular disease ,        PAST SURGICAL HISTORY:  Past Surgical History:   Procedure Laterality Date    HEART CATH, ANGIOPLASTY      multivessel stenting - JULIETA mid LAD x2 and JULIETA 1st diagonal,  complete obstruction 1st diagonal with successful thrombectomy and JULIETA,  in stent restenosis mid LAD (25%), prox to LAD stent had in segment restenosis of 50%, 50-70% in stent restenosis diagonal    HERNIORRHAPHY INGUINAL  2012    Procedure: HERNIORRHAPHY INGUINAL;  left inguinal hernia repair with Mesh ;  Surgeon: Cam Lancaster MD;  Location: [facility] OR    SURGICAL HISTORY OF -       s/p vasectomy     SURGICAL HISTORY OF -   , , 2/10    stent x 2 - LAD/Diagonal - Dr Gastelum - stent  - mid LAD    SURGICAL HISTORY OF -   10/10    stress echo normal       FAMILY HISTORY:  Family History   Problem Relation Age of Onset    Alzheimer Disease Mother     Heart Disease Father         MI    C.A.D. Brother 48        Stents    Family History Negative Other        SOCIAL HISTORY:  Social History     Socioeconomic History    Marital status:      Spouse name: None    Number of children: None    Years of education: None    Highest education level: None   Tobacco Use    Smoking status: Former     Current packs/day: 0.00     Average packs/day: 1 pack/day for 30.2 years (30.2 ttl pk-yrs)     Types: Cigarettes     Start date:      Quit date: 2001     Years since quittin.1    Smokeless tobacco: Never    Tobacco comments:     quit 2001   Substance and Sexual Activity    Alcohol use: No     Alcohol/week: 0.0 standard drinks of alcohol    Drug use: No    Sexual activity: Yes   Other Topics Concern    Parent/sibling w/ CABG, MI or angioplasty before 65F 55M? Yes    Caffeine Concern Yes     Comment: 1 soda a day    Sleep Concern No     Comment: does not sleep well some nights    Stress Concern No    Special Diet No  "   Exercise Yes     Comment: Walking 2 days per week, working PT    Seat Belt Yes       Review of Systems:  Skin:        Eyes:       ENT:       Respiratory:  Negative    Cardiovascular:  palpitations;chest pain;Negative;syncope or near-syncope;cyanosis;dizziness;lightheadedness;edema;fatigue Positive for  Gastroenterology:      Genitourinary:       Musculoskeletal:       Neurologic:       Psychiatric:       Heme/Lymph/Imm:       Endocrine:  Negative      Physical Exam:  Vitals: /75   Pulse 67   Ht 1.702 m (5' 7\")   Wt 70.8 kg (156 lb)   BMI 24.43 kg/m      Constitutional:  cooperative, alert and oriented, well developed, well nourished, in no acute distress        Skin:  warm and dry to the touch          Head:  normocephalic, no masses or lesions        Eyes:  pupils equal and round        Lymph:      ENT:  no pallor or cyanosis, dentition good        Neck:  carotid pulses are full and equal bilaterally        Respiratory:  clear to auscultation;normal symmetry         Cardiac: regular rhythm;no murmurs, gallops or rubs detected                pulses full and equal, no bruits auscultated                                        GI:  abdomen soft;no bruits        Extremities and Muscular Skeletal:  no deformities, clubbing, cyanosis, erythema observed              Neurological:  no gross motor deficits        Psych:  Alert and Oriented x 3        Recent Lab Results:  LIPID RESULTS:  Lab Results   Component Value Date    CHOL 146 05/05/2023    CHOL 152 12/10/2020    HDL 37 (L) 05/05/2023    HDL 41 12/10/2020    LDL 82 05/05/2023    LDL 68 12/10/2020    TRIG 137 05/05/2023    TRIG 217 (H) 12/10/2020    CHOLHDLRATIO 3.9 10/30/2014       LIVER ENZYME RESULTS:  Lab Results   Component Value Date    AST 31 06/09/2007    ALT 35 05/05/2023    ALT 52 12/10/2020       CBC RESULTS:  Lab Results   Component Value Date    WBC 8.9 06/13/2016    RBC 5.11 06/13/2016    HGB 15.9 06/13/2016    HCT 45.4 06/13/2016    MCV 89 " "06/13/2016    MCH 31.1 06/13/2016    MCHC 35.0 06/13/2016    RDW 12.5 06/13/2016     06/13/2016       BMP RESULTS:  Lab Results   Component Value Date     05/05/2023     12/10/2020    POTASSIUM 4.3 05/05/2023    POTASSIUM 4.1 02/23/2022    POTASSIUM 4.4 12/10/2020    CHLORIDE 105 05/05/2023    CHLORIDE 105 02/23/2022    CHLORIDE 104 12/10/2020    CO2 27 05/05/2023    CO2 31 02/23/2022    CO2 31 12/10/2020    ANIONGAP 7 05/05/2023    ANIONGAP 5 02/23/2022    ANIONGAP 5 12/10/2020     (H) 05/05/2023     (H) 02/23/2022     (H) 12/10/2020    BUN 14.6 05/05/2023    BUN 15 02/23/2022    BUN 17 12/10/2020    CR 0.73 05/05/2023    CR 0.74 12/10/2020    GFRESTIMATED >90 05/05/2023    GFRESTIMATED >90 12/10/2020    GFRESTBLACK >90 12/10/2020    PAULA 9.3 05/05/2023    PAULA 9.4 12/10/2020        A1C RESULTS:  No results found for: \"A1C\"    INR RESULTS:  Lab Results   Component Value Date    INR 0.90 07/15/2013    INR 0.92 02/22/2010           CC  No referring provider defined for this encounter.                "

## 2024-05-23 DIAGNOSIS — R93.1 ABNORMAL FINDINGS ON DIAGNOSTIC IMAGING OF HEART AND CORONARY CIRCULATION: Primary | ICD-10-CM

## 2024-05-23 RX ORDER — LIDOCAINE 40 MG/G
CREAM TOPICAL
Status: CANCELLED | OUTPATIENT
Start: 2024-05-23

## 2024-05-23 RX ORDER — POTASSIUM CHLORIDE 1500 MG/1
20 TABLET, EXTENDED RELEASE ORAL
Status: CANCELLED | OUTPATIENT
Start: 2024-05-23

## 2024-05-23 RX ORDER — ASPIRIN 325 MG
325 TABLET ORAL ONCE
Status: CANCELLED | OUTPATIENT
Start: 2024-05-23 | End: 2024-05-23

## 2024-05-23 RX ORDER — ASPIRIN 81 MG/1
243 TABLET, CHEWABLE ORAL ONCE
Status: CANCELLED | OUTPATIENT
Start: 2024-05-23

## 2024-05-23 RX ORDER — SODIUM CHLORIDE 9 MG/ML
INJECTION, SOLUTION INTRAVENOUS CONTINUOUS
Status: CANCELLED | OUTPATIENT
Start: 2024-05-23

## 2024-05-23 NOTE — PROGRESS NOTES
Coronary angiogram/PCI/Right Heart Cath prep instructions.     Patient is scheduled for a Coronary Angiogram at [facility] - [address], [place] [address] - Main Entrance of the Hospital on 5/24/24.  Check in time is at 0830 and procedure to follow.    Patient instructed to remain NPO for solid foods 8 hours prior to arrival and may have clear liquids up to 2 hours prior to arrival.    Patient Patient does not require extra fluids prior to procedure.    Patient is not diabetic.    Patient is not on anticoagulation.    Patient is not on diuretics.     Patient is taking ASA 81mg daily and will take 4 tabs (324mg) the morning of the procedure.    Pt is not on a SGLT2 inhibitor.    Pt is not on a GLP-1 Agonist    Patient advised to take their other daily medications the morning of the procedure with small sips of water.     Verified patient does not have a contrast allergy.    Verified patient has someone available to drive them home from the hospital and can stay with them for 24 hours after the procedure.     Patient advised to notify care team with any new COVID like symptoms prior to procedure. Day of procedure phone number: Isabel at [phone]    Patient is aware of visitor policy.    Patient expresses understanding of above instructions and denies further questions at this time.      Lyn Pineda RN  [facility]

## 2024-05-24 ENCOUNTER — HOSPITAL ENCOUNTER (OUTPATIENT)
Facility: CLINIC | Age: 69
Discharge: HOME | End: 2024-05-24
Attending: INTERNAL MEDICINE | Admitting: INTERNAL MEDICINE
Payer: MEDICARE

## 2024-05-24 VITALS
DIASTOLIC BLOOD PRESSURE: 73 MMHG | TEMPERATURE: 97.7 F | WEIGHT: 156.09 LBS | OXYGEN SATURATION: 98 % | HEART RATE: 61 BPM | RESPIRATION RATE: 16 BRPM | BODY MASS INDEX: 24.45 KG/M2 | SYSTOLIC BLOOD PRESSURE: 120 MMHG

## 2024-05-24 DIAGNOSIS — I25.10 CORONARY ARTERY DISEASE INVOLVING NATIVE CORONARY ARTERY OF NATIVE HEART WITHOUT ANGINA PECTORIS: ICD-10-CM

## 2024-05-24 DIAGNOSIS — R93.1 ABNORMAL FINDINGS ON DIAGNOSTIC IMAGING OF HEART AND CORONARY CIRCULATION: ICD-10-CM

## 2024-05-24 PROBLEM — Z98.890 STATUS POST CORONARY ANGIOGRAM: Status: ACTIVE | Noted: 2024-05-24

## 2024-05-24 LAB
ACT BLD: 247 SECONDS (ref 74–150)
ANION GAP SERPL CALCULATED.3IONS-SCNC: 10 MMOL/L (ref 7–15)
APTT PPP: 26 SECONDS (ref 22–38)
BUN SERPL-MCNC: 11.5 MG/DL (ref 8–23)
CALCIUM SERPL-MCNC: 9.4 MG/DL (ref 8.8–10.2)
CHLORIDE SERPL-SCNC: 103 MMOL/L (ref 98–107)
CREAT SERPL-MCNC: 0.74 MG/DL (ref 0.67–1.17)
DEPRECATED HCO3 PLAS-SCNC: 26 MMOL/L (ref 22–29)
EGFRCR SERPLBLD CKD-EPI 2021: >90 ML/MIN/1.73M2
ERYTHROCYTE [DISTWIDTH] IN BLOOD BY AUTOMATED COUNT: 12.1 % (ref 10–15)
GLUCOSE SERPL-MCNC: 106 MG/DL (ref 70–99)
HCT VFR BLD AUTO: 45 % (ref 40–53)
HGB BLD-MCNC: 14.9 G/DL (ref 13.3–17.7)
INR PPP: 0.95 (ref 0.85–1.15)
MCH RBC QN AUTO: 29.9 PG (ref 26.5–33)
MCHC RBC AUTO-ENTMCNC: 33.1 G/DL (ref 31.5–36.5)
MCV RBC AUTO: 90 FL (ref 78–100)
PLATELET # BLD AUTO: 219 10E3/UL (ref 150–450)
POTASSIUM SERPL-SCNC: 4.2 MMOL/L (ref 3.4–5.3)
RBC # BLD AUTO: 4.99 10E6/UL (ref 4.4–5.9)
SODIUM SERPL-SCNC: 139 MMOL/L (ref 135–145)
WBC # BLD AUTO: 6.9 10E3/UL (ref 4–11)

## 2024-05-24 PROCEDURE — 99153 MOD SED SAME PHYS/QHP EA: CPT | Performed by: INTERNAL MEDICINE

## 2024-05-24 PROCEDURE — 250N000011 HC RX IP 250 OP 636: Performed by: INTERNAL MEDICINE

## 2024-05-24 PROCEDURE — 85610 PROTHROMBIN TIME: CPT | Performed by: INTERNAL MEDICINE

## 2024-05-24 PROCEDURE — 92921 HC PRQ TRLUML CORONARY ANGIOPLASTY ADDL BRANCH: CPT | Performed by: INTERNAL MEDICINE

## 2024-05-24 PROCEDURE — 92978 ENDOLUMINL IVUS OCT C 1ST: CPT | Mod: 26 | Performed by: INTERNAL MEDICINE

## 2024-05-24 PROCEDURE — C1769 GUIDE WIRE: HCPCS | Performed by: INTERNAL MEDICINE

## 2024-05-24 PROCEDURE — 92920 PRQ TRLUML C ANGIOP 1ART&/BR: CPT | Mod: LD | Performed by: INTERNAL MEDICINE

## 2024-05-24 PROCEDURE — 85730 THROMBOPLASTIN TIME PARTIAL: CPT | Performed by: INTERNAL MEDICINE

## 2024-05-24 PROCEDURE — 272N000001 HC OR GENERAL SUPPLY STERILE: Performed by: INTERNAL MEDICINE

## 2024-05-24 PROCEDURE — 85027 COMPLETE CBC AUTOMATED: CPT | Performed by: INTERNAL MEDICINE

## 2024-05-24 PROCEDURE — 93005 ELECTROCARDIOGRAM TRACING: CPT

## 2024-05-24 PROCEDURE — 85347 COAGULATION TIME ACTIVATED: CPT

## 2024-05-24 PROCEDURE — 999N000071 HC STATISTIC HEART CATH LAB OR EP LAB

## 2024-05-24 PROCEDURE — 92979 ENDOLUMINL IVUS OCT C EA: CPT | Performed by: INTERNAL MEDICINE

## 2024-05-24 PROCEDURE — 99152 MOD SED SAME PHYS/QHP 5/>YRS: CPT | Mod: GC | Performed by: INTERNAL MEDICINE

## 2024-05-24 PROCEDURE — 999N000054 HC STATISTIC EKG NON-CHARGEABLE

## 2024-05-24 PROCEDURE — 250N000013 HC RX MED GY IP 250 OP 250 PS 637: Performed by: INTERNAL MEDICINE

## 2024-05-24 PROCEDURE — 80061 LIPID PANEL: CPT | Performed by: INTERNAL MEDICINE

## 2024-05-24 PROCEDURE — 999N000184 HC STATISTIC TELEMETRY

## 2024-05-24 PROCEDURE — C1725 CATH, TRANSLUMIN NON-LASER: HCPCS | Performed by: INTERNAL MEDICINE

## 2024-05-24 PROCEDURE — 92921 PR PRQ TRLUML CORONARY ANGIOPLASTY ADDL BRANCH: CPT | Mod: LD | Performed by: INTERNAL MEDICINE

## 2024-05-24 PROCEDURE — 93454 CORONARY ARTERY ANGIO S&I: CPT | Mod: 26 | Performed by: INTERNAL MEDICINE

## 2024-05-24 PROCEDURE — 92978 ENDOLUMINL IVUS OCT C 1ST: CPT | Mod: LD | Performed by: INTERNAL MEDICINE

## 2024-05-24 PROCEDURE — 258N000003 HC RX IP 258 OP 636: Performed by: INTERNAL MEDICINE

## 2024-05-24 PROCEDURE — C1753 CATH, INTRAVAS ULTRASOUND: HCPCS | Performed by: INTERNAL MEDICINE

## 2024-05-24 PROCEDURE — C1887 CATHETER, GUIDING: HCPCS | Performed by: INTERNAL MEDICINE

## 2024-05-24 PROCEDURE — 93010 ELECTROCARDIOGRAM REPORT: CPT | Mod: XU | Performed by: INTERNAL MEDICINE

## 2024-05-24 PROCEDURE — C1894 INTRO/SHEATH, NON-LASER: HCPCS | Performed by: INTERNAL MEDICINE

## 2024-05-24 PROCEDURE — 80048 BASIC METABOLIC PNL TOTAL CA: CPT | Performed by: INTERNAL MEDICINE

## 2024-05-24 PROCEDURE — 93454 CORONARY ARTERY ANGIO S&I: CPT | Performed by: INTERNAL MEDICINE

## 2024-05-24 PROCEDURE — 99152 MOD SED SAME PHYS/QHP 5/>YRS: CPT | Performed by: INTERNAL MEDICINE

## 2024-05-24 PROCEDURE — 36415 COLL VENOUS BLD VENIPUNCTURE: CPT | Performed by: INTERNAL MEDICINE

## 2024-05-24 PROCEDURE — 250N000009 HC RX 250: Performed by: INTERNAL MEDICINE

## 2024-05-24 RX ORDER — FENTANYL CITRATE 50 UG/ML
INJECTION, SOLUTION INTRAMUSCULAR; INTRAVENOUS
Status: DISCONTINUED | OUTPATIENT
Start: 2024-05-24 | End: 2024-05-24 | Stop reason: HOSPADM

## 2024-05-24 RX ORDER — NALOXONE HYDROCHLORIDE 0.4 MG/ML
0.2 INJECTION, SOLUTION INTRAMUSCULAR; INTRAVENOUS; SUBCUTANEOUS
Status: DISCONTINUED | OUTPATIENT
Start: 2024-05-24 | End: 2024-05-24 | Stop reason: HOSPADM

## 2024-05-24 RX ORDER — NALOXONE HYDROCHLORIDE 0.4 MG/ML
0.4 INJECTION, SOLUTION INTRAMUSCULAR; INTRAVENOUS; SUBCUTANEOUS
Status: DISCONTINUED | OUTPATIENT
Start: 2024-05-24 | End: 2024-05-24 | Stop reason: HOSPADM

## 2024-05-24 RX ORDER — HEPARIN SODIUM 1000 [USP'U]/ML
INJECTION, SOLUTION INTRAVENOUS; SUBCUTANEOUS
Status: DISCONTINUED | OUTPATIENT
Start: 2024-05-24 | End: 2024-05-24 | Stop reason: HOSPADM

## 2024-05-24 RX ORDER — ONDANSETRON 4 MG/1
4 TABLET, ORALLY DISINTEGRATING ORAL EVERY 6 HOURS PRN
Status: DISCONTINUED | OUTPATIENT
Start: 2024-05-24 | End: 2024-05-24 | Stop reason: HOSPADM

## 2024-05-24 RX ORDER — ASPIRIN 325 MG
325 TABLET ORAL ONCE
Status: COMPLETED | OUTPATIENT
Start: 2024-05-24 | End: 2024-05-24

## 2024-05-24 RX ORDER — POTASSIUM CHLORIDE 1500 MG/1
20 TABLET, EXTENDED RELEASE ORAL
Status: DISCONTINUED | OUTPATIENT
Start: 2024-05-24 | End: 2024-05-24 | Stop reason: HOSPADM

## 2024-05-24 RX ORDER — CLOPIDOGREL BISULFATE 75 MG/1
TABLET ORAL
Status: DISCONTINUED | OUTPATIENT
Start: 2024-05-24 | End: 2024-05-24 | Stop reason: HOSPADM

## 2024-05-24 RX ORDER — ASPIRIN 81 MG/1
243 TABLET, CHEWABLE ORAL ONCE
Status: COMPLETED | OUTPATIENT
Start: 2024-05-24 | End: 2024-05-24

## 2024-05-24 RX ORDER — ASPIRIN 81 MG/1
81 TABLET ORAL DAILY
Status: DISCONTINUED | OUTPATIENT
Start: 2024-05-25 | End: 2024-05-24 | Stop reason: HOSPADM

## 2024-05-24 RX ORDER — OXYCODONE HYDROCHLORIDE 5 MG/1
5 TABLET ORAL EVERY 4 HOURS PRN
Status: DISCONTINUED | OUTPATIENT
Start: 2024-05-24 | End: 2024-05-24 | Stop reason: HOSPADM

## 2024-05-24 RX ORDER — HYDRALAZINE HYDROCHLORIDE 20 MG/ML
10 INJECTION INTRAMUSCULAR; INTRAVENOUS EVERY 4 HOURS PRN
Status: DISCONTINUED | OUTPATIENT
Start: 2024-05-24 | End: 2024-05-24 | Stop reason: HOSPADM

## 2024-05-24 RX ORDER — ONDANSETRON 2 MG/ML
4 INJECTION INTRAMUSCULAR; INTRAVENOUS EVERY 6 HOURS PRN
Status: DISCONTINUED | OUTPATIENT
Start: 2024-05-24 | End: 2024-05-24 | Stop reason: HOSPADM

## 2024-05-24 RX ORDER — METOPROLOL TARTRATE 1 MG/ML
5 INJECTION, SOLUTION INTRAVENOUS
Status: DISCONTINUED | OUTPATIENT
Start: 2024-05-24 | End: 2024-05-24 | Stop reason: HOSPADM

## 2024-05-24 RX ORDER — IOPAMIDOL 755 MG/ML
INJECTION, SOLUTION INTRAVASCULAR
Status: DISCONTINUED | OUTPATIENT
Start: 2024-05-24 | End: 2024-05-24 | Stop reason: HOSPADM

## 2024-05-24 RX ORDER — SODIUM CHLORIDE 9 MG/ML
INJECTION, SOLUTION INTRAVENOUS CONTINUOUS
Status: DISCONTINUED | OUTPATIENT
Start: 2024-05-24 | End: 2024-05-24 | Stop reason: HOSPADM

## 2024-05-24 RX ORDER — SODIUM CHLORIDE 9 MG/ML
INJECTION, SOLUTION INTRAVENOUS CONTINUOUS
Status: ACTIVE | OUTPATIENT
Start: 2024-05-24 | End: 2024-05-24

## 2024-05-24 RX ORDER — VERAPAMIL HYDROCHLORIDE 2.5 MG/ML
INJECTION, SOLUTION INTRAVENOUS
Status: DISCONTINUED | OUTPATIENT
Start: 2024-05-24 | End: 2024-05-24 | Stop reason: HOSPADM

## 2024-05-24 RX ORDER — OXYCODONE HYDROCHLORIDE 5 MG/1
10 TABLET ORAL EVERY 4 HOURS PRN
Status: DISCONTINUED | OUTPATIENT
Start: 2024-05-24 | End: 2024-05-24 | Stop reason: HOSPADM

## 2024-05-24 RX ORDER — FLUMAZENIL 0.1 MG/ML
0.2 INJECTION, SOLUTION INTRAVENOUS
Status: DISCONTINUED | OUTPATIENT
Start: 2024-05-24 | End: 2024-05-24 | Stop reason: HOSPADM

## 2024-05-24 RX ORDER — NITROGLYCERIN 0.4 MG/1
0.4 TABLET SUBLINGUAL EVERY 5 MIN PRN
Status: DISCONTINUED | OUTPATIENT
Start: 2024-05-24 | End: 2024-05-24 | Stop reason: HOSPADM

## 2024-05-24 RX ORDER — ACETAMINOPHEN 325 MG/1
650 TABLET ORAL EVERY 4 HOURS PRN
Status: DISCONTINUED | OUTPATIENT
Start: 2024-05-24 | End: 2024-05-24 | Stop reason: HOSPADM

## 2024-05-24 RX ORDER — NITROGLYCERIN 5 MG/ML
VIAL (ML) INTRAVENOUS
Status: DISCONTINUED | OUTPATIENT
Start: 2024-05-24 | End: 2024-05-24 | Stop reason: HOSPADM

## 2024-05-24 RX ORDER — LIDOCAINE 40 MG/G
CREAM TOPICAL
Status: DISCONTINUED | OUTPATIENT
Start: 2024-05-24 | End: 2024-05-24 | Stop reason: HOSPADM

## 2024-05-24 RX ORDER — FENTANYL CITRATE 50 UG/ML
25 INJECTION, SOLUTION INTRAMUSCULAR; INTRAVENOUS
Status: DISCONTINUED | OUTPATIENT
Start: 2024-05-24 | End: 2024-05-24 | Stop reason: HOSPADM

## 2024-05-24 RX ORDER — ASPIRIN 81 MG/1
81 TABLET, CHEWABLE ORAL ONCE
Status: DISCONTINUED | OUTPATIENT
Start: 2024-05-24 | End: 2024-05-24 | Stop reason: HOSPADM

## 2024-05-24 RX ORDER — ATROPINE SULFATE 0.1 MG/ML
0.5 INJECTION INTRAVENOUS
Status: DISCONTINUED | OUTPATIENT
Start: 2024-05-24 | End: 2024-05-24 | Stop reason: HOSPADM

## 2024-05-24 RX ADMIN — SODIUM CHLORIDE: 9 INJECTION, SOLUTION INTRAVENOUS at 09:25

## 2024-05-24 RX ADMIN — ASPIRIN 81 MG CHEWABLE TABLET 243 MG: 81 TABLET CHEWABLE at 10:12

## 2024-05-24 ASSESSMENT — ACTIVITIES OF DAILY LIVING (ADL)
ADLS_ACUITY_SCORE: 35

## 2024-05-24 NOTE — DISCHARGE INSTRUCTIONS
Cardiac Angioplasty/Stent Discharge Instructions - Radial    After you go home:    Have an adult stay with you until tomorrow.  Drink extra fluids for 2 days.  You may resume your normal diet.  No smoking       For 24 hours - due to the sedation you received:  Relax and take it easy.  Do NOT make any important or legal decisions.  Do NOT drive or operate machines at home or at work.  Do NOT drink alcohol.    Care of Wrist Puncture Site:    For the first 24 hrs - check the puncture site every 1-2 hours while awake.  It is normal to have soreness at the puncture site and mild tingling in your hand for up to 3 days.  Remove the bandaid after 24 hours. If there is minor oozing, apply another bandaid and remove it after 12 hours.  You may shower tomorrow.  Do NOT take a bath, or use a hot tub or pool for at least 3 days. Do NOT scrub the site. Do not use lotion or powder near the puncture site.           Activity:          For 2 days:   do not use your hand or arm to support your weight (such as rising from a chair)   do not bend your wrist (such as lifting a garage door).  do not lift more than 5 pounds or exercise your arm (such as tennis, golf or bowling).  Do NOT do any heavy activity such as exercise, lifting, or straining.     Bleeding:    If you start bleeding from the site in your wrist, sit down and press firmly on/above the site for 10 minutes.   Once bleeding stops, keep arm still for 2 hours.   Call [facility] as soon as you can.       Call 911 right away if you have heavy bleeding or bleeding that does not stop.      Medicines:    If you are taking an antiplatelet medication such as Plavix, Brilinta or Effient, do not stop taking it until you talk to your cardiologist.      Take your medications, including blood thinners, unless your provider tells you not to.    If you have stopped any medicines, check with your provider about when to restart them.    Follow Up Appointments:    Follow up with [facility]  Nurse Practitioner at [facility] of patient preference in 7-10 days.  Cardiac Rehab will contact you for follow up care.    Call the clinic if:    You have a large or growing hard lump around the site.  The site is red, swollen, hot or tender.  Blood or fluid is draining from the site.  You have chills or a fever greater than 101 F (38 C).  Your arm feels numb, cool or changes color.  You have hives, a rash or unusual itching.  Any questions or concerns.    Other Instructions:    If you received a stent - carry your stent card with you at all times.      [facility] [place]:    [phone] [facility] (7 days a week)    Or you may contact your provider via My Chart

## 2024-05-24 NOTE — Clinical Note
The first balloon was inserted into the left anterior descending.Max pressure = 13 dora. Total duration = 31 seconds.     Max pressure = 16 dora. Total duration = 29 seconds.    Balloon reinflated a second time: Max pressure = 16 dora. Total duration = 29 seconds.  Balloon reinflated a third time: Max pressure = 18 dora. Total duration = 28 seconds.  Balloon reinflated a fourth time: Max pressure = 18 dora. Total duration = 26 seconds.  Ball oon reinflated a fourth time: Max pressure = 18 dora. Total duration = 29 seconds.

## 2024-05-24 NOTE — PROGRESS NOTES
1102 Report received from Teresa Wilson RN. Pt waiting for procedure.  1115 Dr Davidson at bedside to speak with pt. Consent signed at this time. No family present.  1140 Pt to Cath Lab at this time.  1203 Report given to Teresa Wilson RN.

## 2024-05-24 NOTE — PROGRESS NOTES
AVS/Discharge instructions given and reviewed.  All questions and concerns addressed at this time with verbal understanding received.

## 2024-05-24 NOTE — PROGRESS NOTES
Care Suites Post Procedure Note    Patient Information  Name: Luis Miguel Barnett  Age: 68 year old    Post Procedure  Time patient returned to Care Suites: 1245  Concerns/abnormal assessment: None at this time.  If abnormal assessment, provider notified: N/A  Plan/Other: Per orders.    Teresa Wilson RN

## 2024-05-24 NOTE — PRE-PROCEDURE
GENERAL PRE-PROCEDURE:     Risks and benefits: Risks, benefits and alternatives were discussed    Consent given by:  Patient  Patient states understanding of procedure being performed: Yes    Patient's understanding of procedure matches consent: Yes    Procedure consent matches procedure scheduled: Yes    Expected level of sedation:  Moderate  Appropriately NPO:  Yes  ASA Class:  2  Mallampati  :  Grade 2- soft palate, base of uvula, tonsillar pillars, and portion of posterior pharyngeal wall visible  Lungs:  Lungs clear with good breath sounds bilaterally  Heart:  Normal heart sounds and rate  History & Physical reviewed:  History and physical reviewed and no updates needed  Statement of review:  I have reviewed the lab findings, diagnostic data, medications, and the plan for sedation

## 2024-05-24 NOTE — Clinical Note
Max pressure = 15 dora. Total duration = 26 seconds.     Max pressure = 15 dora. Total duration = 23 seconds.    Balloon reinflated a second time: Max pressure = 15 dora. Total duration = 23 seconds.  Balloon reinflated a third time: Max pressure = 16 dora. Total duration = 29 seconds.  Balloon reinflated a fourth time:

## 2024-05-24 NOTE — PROGRESS NOTES
Care Suites Admission Nursing Note    Patient Information  Name: Luis Miguel Barnett  Age: 68 year old  Reason for admission: Coronary angiogram.  Care Suites arrival time: 0830    Patient Admission/Assessment   Pre-procedure assessment complete: Yes  If abnormal assessment/labs, provider notified: N/A  NPO: Yes  Medications held per instructions/orders: N/A  Consent: deferred  If applicable, pregnancy test status: deferred  Patient oriented to room: Yes  Education/questions answered: Yes  Plan/other: Proceed as scheduled/ordered.    Discharge Planning  Discharge name/phone number: Giovanni [phone]  Overnight post sedation caregiver: Gabi-spouse [phone]  Discharge location: home    Teresa Wilson RN

## 2024-05-24 NOTE — Clinical Note
Hemodynamic equipment used: 5 lead ECG, DocuTAPK With 3 Leads, Machine BP Cuff and pulse oximeter probe.

## 2024-05-25 LAB
CHOLEST SERPL-MCNC: 146 MG/DL
HDLC SERPL-MCNC: 36 MG/DL
LDLC SERPL CALC-MCNC: 73 MG/DL
NONHDLC SERPL-MCNC: 110 MG/DL
TRIGL SERPL-MCNC: 184 MG/DL

## 2024-05-26 LAB
ATRIAL RATE - MUSE: 45 BPM
DIASTOLIC BLOOD PRESSURE - MUSE: NORMAL MMHG
INTERPRETATION ECG - MUSE: NORMAL
P AXIS - MUSE: 40 DEGREES
PR INTERVAL - MUSE: 136 MS
QRS DURATION - MUSE: 80 MS
QT - MUSE: 456 MS
QTC - MUSE: 394 MS
R AXIS - MUSE: 32 DEGREES
SYSTOLIC BLOOD PRESSURE - MUSE: NORMAL MMHG
T AXIS - MUSE: 56 DEGREES
VENTRICULAR RATE- MUSE: 45 BPM

## 2024-05-29 DIAGNOSIS — I25.10 CORONARY ARTERY DISEASE INVOLVING NATIVE CORONARY ARTERY OF NATIVE HEART WITHOUT ANGINA PECTORIS: ICD-10-CM

## 2024-05-29 DIAGNOSIS — E78.00 PURE HYPERCHOLESTEROLEMIA: ICD-10-CM

## 2024-05-29 DIAGNOSIS — I10 BENIGN ESSENTIAL HYPERTENSION: ICD-10-CM

## 2024-05-29 RX ORDER — LISINOPRIL 5 MG/1
5 TABLET ORAL DAILY
Qty: 90 TABLET | Refills: 4 | Status: SHIPPED | OUTPATIENT
Start: 2024-05-29

## 2024-05-29 RX ORDER — EZETIMIBE AND SIMVASTATIN 10; 40 MG/1; MG/1
1 TABLET ORAL AT BEDTIME
Qty: 90 TABLET | Refills: 4 | Status: SHIPPED | OUTPATIENT
Start: 2024-05-29

## 2024-05-29 RX ORDER — METOPROLOL SUCCINATE 25 MG/1
25 TABLET, EXTENDED RELEASE ORAL DAILY
Qty: 90 TABLET | Refills: 4 | Status: SHIPPED | OUTPATIENT
Start: 2024-05-29

## 2024-05-29 RX ORDER — CLOPIDOGREL BISULFATE 75 MG/1
TABLET ORAL
Qty: 90 TABLET | Refills: 4 | Status: SHIPPED | OUTPATIENT
Start: 2024-05-29

## 2024-05-30 ENCOUNTER — OFFICE VISIT (OUTPATIENT)
Dept: CARDIOLOGY | Facility: CLINIC | Age: 69
End: 2024-05-30
Payer: MEDICARE

## 2024-05-30 VITALS
SYSTOLIC BLOOD PRESSURE: 111 MMHG | BODY MASS INDEX: 24.33 KG/M2 | HEIGHT: 67 IN | DIASTOLIC BLOOD PRESSURE: 74 MMHG | HEART RATE: 55 BPM | WEIGHT: 155 LBS

## 2024-05-30 DIAGNOSIS — E78.00 PURE HYPERCHOLESTEROLEMIA: ICD-10-CM

## 2024-05-30 DIAGNOSIS — Z98.61 S/P PTCA (PERCUTANEOUS TRANSLUMINAL CORONARY ANGIOPLASTY): Primary | ICD-10-CM

## 2024-05-30 DIAGNOSIS — I10 BENIGN ESSENTIAL HYPERTENSION: ICD-10-CM

## 2024-05-30 DIAGNOSIS — I25.10 CORONARY ARTERY DISEASE INVOLVING NATIVE CORONARY ARTERY OF NATIVE HEART WITHOUT ANGINA PECTORIS: ICD-10-CM

## 2024-05-30 PROCEDURE — 99213 OFFICE O/P EST LOW 20 MIN: CPT | Performed by: INTERNAL MEDICINE

## 2024-05-30 NOTE — LETTER
5/30/2024    Tommie King MD  [address] [facility] [address]    RE: Luis Miguel Barnett       Dear Colleague,     I had the pleasure of seeing Luis Miguel Barnett in the [facility].  HPI and Plan:   Luis Miguel Barnett is a 68 year old male who presents with history of coronary artery disease, remote stenting to his left anterior descending artery and diagonal branch in 2008, revascularization with thrombectomy of the first diagonal in 2013.  He had a recent stress test indicating anteroseptal ischemia and therefore underwent repeat coronary angiography.  Angiography demonstrated severe in-stent restenosis involving both the LAD and diagonal vessels, therefore he underwent IVUS guided PCI to both areas.  Angioplasty was performed but additional stents were not placed as they were felt not to be needed given excellent angioplasty results.  I did review the images with him today.  No complications occurred from the procedure.  He has had little twinges of chest discomfort since the procedure, lasting only 3 to 5 seconds at a time.    Summary    1.  Coronary artery disease with recent revascularization of both LAD and diagonal 1 due to severe in-stent restenosis.  I will likely want to keep him on DAPT for minimum of 18 months given the severe in-stent restenosis in the same areas as previously noted.  There is also very proximal or ostial diagonal 1 area that looks slightly pinched post procedure.  It appears that this stent does not extend into the ostium of this vessel.  TIAN III flow was noted down the vessel post procedure.  I will recommend we continue with annual stress echocardiogram as this has been very helpful in diagnosing restenosis for him.    2.  Chest pains-fleeting chest pains lasting 3 to 5 seconds post procedure, could be vasospasm.  Recommend just monitoring at this time, however if symptoms worsen or persist I have asked him to contact me.    I will plan to see him back in a  year with stress echocardiogram, please feel free to contact me with any questions given regards to his care       Today's clinic visit entailed:    22 minutes spent by me on the date of the encounter doing chart review, history and exam, documentation and further activities per the note  Provider  Link to MDM Help Grid         No orders of the defined types were placed in this encounter.    No orders of the defined types were placed in this encounter.    There are no discontinued medications.      Encounter Diagnoses   Name Primary?    Coronary artery disease involving native coronary artery of native heart without angina pectoris     Pure hypercholesterolemia     Benign essential hypertension        CURRENT MEDICATIONS:  Current Outpatient Medications   Medication Sig Dispense Refill    ascorbic acid (VITAMIN C) 500 MG tablet Take by mouth daily      ASPIRIN 81 MG OR TABS ONE DAILY 100 3    benzonatate (TESSALON) 100 MG capsule Take 1 capsule (100 mg) by mouth 3 times daily as needed for cough 30 capsule 0    clopidogrel (PLAVIX) 75 MG tablet Take one tablet (75 mg) by mouth daily 90 tablet 4    diazepam (VALIUM) 5 MG tablet Take 1 tablet (5 mg) by mouth every 6 hours as needed for anxiety 4 tablet 0    ezetimibe-simvastatin (VYTORIN) 10-40 MG tablet Take 1 tablet by mouth at bedtime 90 tablet 4    lisinopril (ZESTRIL) 5 MG tablet Take 1 tablet (5 mg) by mouth daily 90 tablet 4    metoprolol succinate ER (TOPROL XL) 25 MG 24 hr tablet Take 1 tablet (25 mg) by mouth daily Appointment needed for further refills 90 tablet 4    Multiple Vitamin (MULTIVITAMINS PO) Take 1 tablet by mouth daily      nitroGLYcerin (NITROSTAT) 0.4 MG sublingual tablet Place 1 tablet (0.4 mg) under the tongue every 5 minutes as needed for chest pain 25 tablet 2    Throat Lozenges (COUGH DROPS MT) Take by mouth as needed       UNABLE TO FIND Pure ZZZ         ALLERGIES   No Known Allergies    PAST MEDICAL HISTORY:  Past Medical History:    Diagnosis Date    Antiplatelet or antithrombotic long-term use     Coronary artery disease 2007    multivessel stenting - JULIETA mid LAD x2 and JULIETA 1st diagonal, 2008 complete obstruction 1st diagonal with successful thrombectomy and JULIETA, 2013 in stent restenosis mid LAD (25%), prox to LAD stent had in segment restenosis of 50%, 50-70% in stent restenosis diagonal    Hypertension     Presbyopia     Pure hypercholesterolemia     Stented coronary artery     Unspecified cardiovascular disease 6/07, 11/08       PAST SURGICAL HISTORY:  Past Surgical History:   Procedure Laterality Date    CV CORONARY ANGIOGRAM N/A 5/24/2024    Procedure: Coronary Angiogram;  Surgeon: Reid Davidson MD;  Location:  HEART CARDIAC CATH LAB    CV INTRAVASULAR ULTRASOUND N/A 5/24/2024    Procedure: Intravascular Ultrasound;  Surgeon: Reid Davidson MD;  Location:  HEART CARDIAC CATH LAB    CV PCI ANGIOPLASTY N/A 5/24/2024    Procedure: Percutaneous Transluminal Angioplasty;  Surgeon: Reid Davidson MD;  Location:  HEART CARDIAC CATH LAB    HEART CATH, ANGIOPLASTY      multivessel stenting - JULIETA mid LAD x2 and JULIETA 1st diagonal, 2008 complete obstruction 1st diagonal with successful thrombectomy and JULIETA, 2013 in stent restenosis mid LAD (25%), prox to LAD stent had in segment restenosis of 50%, 50-70% in stent restenosis diagonal    HERNIORRHAPHY INGUINAL  11/16/2012    Procedure: HERNIORRHAPHY INGUINAL;  left inguinal hernia repair with Mesh ;  Surgeon: Cam Lancaster MD;  Location: [facility] OR    SURGICAL HISTORY OF -   1994    s/p vasectomy     SURGICAL HISTORY OF -   6/07, 11/08, 2/10    stent x 2 - LAD/Diagonal - Dr Gastelum - stent 11/08 - mid LAD    SURGICAL HISTORY OF -   10/10    stress echo normal       FAMILY HISTORY:  Family History   Problem Relation Age of Onset    Alzheimer Disease Mother     Heart Disease Father         MI    C.A.D. Brother 48        Stents    Family History Negative Other        SOCIAL HISTORY:  Social  "History     Socioeconomic History    Marital status:      Spouse name: None    Number of children: None    Years of education: None    Highest education level: None   Tobacco Use    Smoking status: Former     Current packs/day: 0.00     Average packs/day: 1 pack/day for 30.2 years (30.2 ttl pk-yrs)     Types: Cigarettes     Start date:      Quit date: 2001     Years since quittin.1    Smokeless tobacco: Never    Tobacco comments:     quit 2001   Substance and Sexual Activity    Alcohol use: No     Alcohol/week: 0.0 standard drinks of alcohol    Drug use: No    Sexual activity: Yes   Other Topics Concern    Parent/sibling w/ CABG, MI or angioplasty before 65F 55M? Yes    Caffeine Concern Yes     Comment: 1 soda a day    Sleep Concern No     Comment: does not sleep well some nights    Stress Concern No    Special Diet No    Exercise Yes     Comment: Walking 2 days per week, working PT    Seat Belt Yes       Review of Systems:  Skin:        Eyes:       ENT:       Respiratory:  Negative    Cardiovascular:  Negative;palpitations;chest pain;dizziness;syncope or near-syncope;cyanosis;lightheadedness;fatigue;edema    Gastroenterology:      Genitourinary:       Musculoskeletal:  Positive for    Neurologic:       Psychiatric:       Heme/Lymph/Imm:       Endocrine:         Physical Exam:  Vitals: /74   Pulse 55   Ht 1.702 m (5' 7\")   Wt 70.3 kg (155 lb)   BMI 24.28 kg/m      Constitutional:  cooperative, alert and oriented, well developed, well nourished, in no acute distress        Skin:  warm and dry to the touch          Head:  normocephalic, no masses or lesions        Eyes:  pupils equal and round        Lymph:      ENT:  no pallor or cyanosis, dentition good        Neck:  carotid pulses are full and equal bilaterally        Respiratory:  clear to auscultation;normal symmetry         Cardiac: regular rhythm;no murmurs, gallops or rubs detected                pulses full and equal, no bruits " "auscultated                                        GI:  abdomen soft;no bruits        Extremities and Muscular Skeletal:  no deformities, clubbing, cyanosis, erythema observed              Neurological:  no gross motor deficits        Psych:  Alert and Oriented x 3        Recent Lab Results:  LIPID RESULTS:  Lab Results   Component Value Date    CHOL 146 05/24/2024    CHOL 152 12/10/2020    HDL 36 (L) 05/24/2024    HDL 41 12/10/2020    LDL 73 05/24/2024    LDL 68 12/10/2020    TRIG 184 (H) 05/24/2024    TRIG 217 (H) 12/10/2020    CHOLHDLRATIO 3.9 10/30/2014       LIVER ENZYME RESULTS:  Lab Results   Component Value Date    AST 31 06/09/2007    ALT 35 05/05/2023    ALT 52 12/10/2020       CBC RESULTS:  Lab Results   Component Value Date    WBC 6.9 05/24/2024    WBC 8.9 06/13/2016    RBC 4.99 05/24/2024    RBC 5.11 06/13/2016    HGB 14.9 05/24/2024    HGB 15.9 06/13/2016    HCT 45.0 05/24/2024    HCT 45.4 06/13/2016    MCV 90 05/24/2024    MCV 89 06/13/2016    MCH 29.9 05/24/2024    MCH 31.1 06/13/2016    MCHC 33.1 05/24/2024    MCHC 35.0 06/13/2016    RDW 12.1 05/24/2024    RDW 12.5 06/13/2016     05/24/2024     06/13/2016       BMP RESULTS:  Lab Results   Component Value Date     05/24/2024     12/10/2020    POTASSIUM 4.2 05/24/2024    POTASSIUM 4.1 02/23/2022    POTASSIUM 4.4 12/10/2020    CHLORIDE 103 05/24/2024    CHLORIDE 105 02/23/2022    CHLORIDE 104 12/10/2020    CO2 26 05/24/2024    CO2 31 02/23/2022    CO2 31 12/10/2020    ANIONGAP 10 05/24/2024    ANIONGAP 5 02/23/2022    ANIONGAP 5 12/10/2020     (H) 05/24/2024     (H) 02/23/2022     (H) 12/10/2020    BUN 11.5 05/24/2024    BUN 15 02/23/2022    BUN 17 12/10/2020    CR 0.74 05/24/2024    CR 0.74 12/10/2020    GFRESTIMATED >90 05/24/2024    GFRESTIMATED >90 12/10/2020    GFRESTBLACK >90 12/10/2020    PAULA 9.4 05/24/2024    PAULA 9.4 12/10/2020        A1C RESULTS:  No results found for: \"A1C\"    INR RESULTS:  Lab " Results   Component Value Date    INR 0.95 05/24/2024    INR 0.90 07/15/2013    INR 0.92 02/22/2010           CC  Saniya Bustillo DO  [address]  [place] [address]    Thank you for allowing me to participate in the care of your patient.      Sincerely,     Saniya Bustillo DO     [facility]

## 2024-05-30 NOTE — PROGRESS NOTES
HPI and Plan:   Luis Miguel Barnett is a 68 year old male who presents with history of coronary artery disease, remote stenting to his left anterior descending artery and diagonal branch in 2008, revascularization with thrombectomy of the first diagonal in 2013.  He had a recent stress test indicating anteroseptal ischemia and therefore underwent repeat coronary angiography.  Angiography demonstrated severe in-stent restenosis involving both the LAD and diagonal vessels, therefore he underwent IVUS guided PCI to both areas.  Angioplasty was performed but additional stents were not placed as they were felt not to be needed given excellent angioplasty results.  I did review the images with him today.  No complications occurred from the procedure.  He has had little twinges of chest discomfort since the procedure, lasting only 3 to 5 seconds at a time.    Summary    1.  Coronary artery disease with recent revascularization of both LAD and diagonal 1 due to severe in-stent restenosis.  I will likely want to keep him on DAPT for minimum of 18 months given the severe in-stent restenosis in the same areas as previously noted.  There is also very proximal or ostial diagonal 1 area that looks slightly pinched post procedure.  It appears that this stent does not extend into the ostium of this vessel.  TIAN III flow was noted down the vessel post procedure.  I will recommend we continue with annual stress echocardiogram as this has been very helpful in diagnosing restenosis for him.    2.  Chest pains-fleeting chest pains lasting 3 to 5 seconds post procedure, could be vasospasm.  Recommend just monitoring at this time, however if symptoms worsen or persist I have asked him to contact me.    I will plan to see him back in a year with stress echocardiogram, please feel free to contact me with any questions given regards to his care       Today's clinic visit entailed:    22 minutes spent by me on the date of the encounter doing  chart review, history and exam, documentation and further activities per the note  Provider  Link to MDM Help Grid         No orders of the defined types were placed in this encounter.    No orders of the defined types were placed in this encounter.    There are no discontinued medications.      Encounter Diagnoses   Name Primary?    Coronary artery disease involving native coronary artery of native heart without angina pectoris     Pure hypercholesterolemia     Benign essential hypertension        CURRENT MEDICATIONS:  Current Outpatient Medications   Medication Sig Dispense Refill    ascorbic acid (VITAMIN C) 500 MG tablet Take by mouth daily      ASPIRIN 81 MG OR TABS ONE DAILY 100 3    benzonatate (TESSALON) 100 MG capsule Take 1 capsule (100 mg) by mouth 3 times daily as needed for cough 30 capsule 0    clopidogrel (PLAVIX) 75 MG tablet Take one tablet (75 mg) by mouth daily 90 tablet 4    diazepam (VALIUM) 5 MG tablet Take 1 tablet (5 mg) by mouth every 6 hours as needed for anxiety 4 tablet 0    ezetimibe-simvastatin (VYTORIN) 10-40 MG tablet Take 1 tablet by mouth at bedtime 90 tablet 4    lisinopril (ZESTRIL) 5 MG tablet Take 1 tablet (5 mg) by mouth daily 90 tablet 4    metoprolol succinate ER (TOPROL XL) 25 MG 24 hr tablet Take 1 tablet (25 mg) by mouth daily Appointment needed for further refills 90 tablet 4    Multiple Vitamin (MULTIVITAMINS PO) Take 1 tablet by mouth daily      nitroGLYcerin (NITROSTAT) 0.4 MG sublingual tablet Place 1 tablet (0.4 mg) under the tongue every 5 minutes as needed for chest pain 25 tablet 2    Throat Lozenges (COUGH DROPS MT) Take by mouth as needed       UNABLE TO FIND Pure ZZZ         ALLERGIES   No Known Allergies    PAST MEDICAL HISTORY:  Past Medical History:   Diagnosis Date    Antiplatelet or antithrombotic long-term use     Coronary artery disease 2007    multivessel stenting - JULIETA mid LAD x2 and JULIETA 1st diagonal, 2008 complete obstruction 1st diagonal with  successful thrombectomy and JULIETA, 2013 in stent restenosis mid LAD (25%), prox to LAD stent had in segment restenosis of 50%, 50-70% in stent restenosis diagonal    Hypertension     Presbyopia     Pure hypercholesterolemia     Stented coronary artery     Unspecified cardiovascular disease 6/07, 11/08       PAST SURGICAL HISTORY:  Past Surgical History:   Procedure Laterality Date    CV CORONARY ANGIOGRAM N/A 5/24/2024    Procedure: Coronary Angiogram;  Surgeon: Reid Davidson MD;  Location:  HEART CARDIAC CATH LAB    CV INTRAVASULAR ULTRASOUND N/A 5/24/2024    Procedure: Intravascular Ultrasound;  Surgeon: Reid Davidson MD;  Location:  HEART CARDIAC CATH LAB    CV PCI ANGIOPLASTY N/A 5/24/2024    Procedure: Percutaneous Transluminal Angioplasty;  Surgeon: Reid Davidson MD;  Location:  HEART CARDIAC CATH LAB    HEART CATH, ANGIOPLASTY      multivessel stenting - JULIETA mid LAD x2 and JULIETA 1st diagonal, 2008 complete obstruction 1st diagonal with successful thrombectomy and JULIETA, 2013 in stent restenosis mid LAD (25%), prox to LAD stent had in segment restenosis of 50%, 50-70% in stent restenosis diagonal    HERNIORRHAPHY INGUINAL  11/16/2012    Procedure: HERNIORRHAPHY INGUINAL;  left inguinal hernia repair with Mesh ;  Surgeon: Cam Lancaster MD;  Location: [facility] OR    SURGICAL HISTORY OF -   1994    s/p vasectomy     SURGICAL HISTORY OF -   6/07, 11/08, 2/10    stent x 2 - LAD/Diagonal - Dr Gastelum - stent 11/08 - mid LAD    SURGICAL HISTORY OF -   10/10    stress echo normal       FAMILY HISTORY:  Family History   Problem Relation Age of Onset    Alzheimer Disease Mother     Heart Disease Father         MI    C.A.D. Brother 48        Stents    Family History Negative Other        SOCIAL HISTORY:  Social History     Socioeconomic History    Marital status:      Spouse name: None    Number of children: None    Years of education: None    Highest education level: None   Tobacco Use    Smoking status:  "Former     Current packs/day: 0.00     Average packs/day: 1 pack/day for 30.2 years (30.2 ttl pk-yrs)     Types: Cigarettes     Start date:      Quit date: 2001     Years since quittin.1    Smokeless tobacco: Never    Tobacco comments:     quit 2001   Substance and Sexual Activity    Alcohol use: No     Alcohol/week: 0.0 standard drinks of alcohol    Drug use: No    Sexual activity: Yes   Other Topics Concern    Parent/sibling w/ CABG, MI or angioplasty before 65F 55M? Yes    Caffeine Concern Yes     Comment: 1 soda a day    Sleep Concern No     Comment: does not sleep well some nights    Stress Concern No    Special Diet No    Exercise Yes     Comment: Walking 2 days per week, working PT    Seat Belt Yes       Review of Systems:  Skin:        Eyes:       ENT:       Respiratory:  Negative    Cardiovascular:  Negative;palpitations;chest pain;dizziness;syncope or near-syncope;cyanosis;lightheadedness;fatigue;edema    Gastroenterology:      Genitourinary:       Musculoskeletal:  Positive for    Neurologic:       Psychiatric:       Heme/Lymph/Imm:       Endocrine:         Physical Exam:  Vitals: /74   Pulse 55   Ht 1.702 m (5' 7\")   Wt 70.3 kg (155 lb)   BMI 24.28 kg/m      Constitutional:  cooperative, alert and oriented, well developed, well nourished, in no acute distress        Skin:  warm and dry to the touch          Head:  normocephalic, no masses or lesions        Eyes:  pupils equal and round        Lymph:      ENT:  no pallor or cyanosis, dentition good        Neck:  carotid pulses are full and equal bilaterally        Respiratory:  clear to auscultation;normal symmetry         Cardiac: regular rhythm;no murmurs, gallops or rubs detected                pulses full and equal, no bruits auscultated                                        GI:  abdomen soft;no bruits        Extremities and Muscular Skeletal:  no deformities, clubbing, cyanosis, erythema observed              Neurological:  " "no gross motor deficits        Psych:  Alert and Oriented x 3        Recent Lab Results:  LIPID RESULTS:  Lab Results   Component Value Date    CHOL 146 05/24/2024    CHOL 152 12/10/2020    HDL 36 (L) 05/24/2024    HDL 41 12/10/2020    LDL 73 05/24/2024    LDL 68 12/10/2020    TRIG 184 (H) 05/24/2024    TRIG 217 (H) 12/10/2020    CHOLHDLRATIO 3.9 10/30/2014       LIVER ENZYME RESULTS:  Lab Results   Component Value Date    AST 31 06/09/2007    ALT 35 05/05/2023    ALT 52 12/10/2020       CBC RESULTS:  Lab Results   Component Value Date    WBC 6.9 05/24/2024    WBC 8.9 06/13/2016    RBC 4.99 05/24/2024    RBC 5.11 06/13/2016    HGB 14.9 05/24/2024    HGB 15.9 06/13/2016    HCT 45.0 05/24/2024    HCT 45.4 06/13/2016    MCV 90 05/24/2024    MCV 89 06/13/2016    MCH 29.9 05/24/2024    MCH 31.1 06/13/2016    MCHC 33.1 05/24/2024    MCHC 35.0 06/13/2016    RDW 12.1 05/24/2024    RDW 12.5 06/13/2016     05/24/2024     06/13/2016       BMP RESULTS:  Lab Results   Component Value Date     05/24/2024     12/10/2020    POTASSIUM 4.2 05/24/2024    POTASSIUM 4.1 02/23/2022    POTASSIUM 4.4 12/10/2020    CHLORIDE 103 05/24/2024    CHLORIDE 105 02/23/2022    CHLORIDE 104 12/10/2020    CO2 26 05/24/2024    CO2 31 02/23/2022    CO2 31 12/10/2020    ANIONGAP 10 05/24/2024    ANIONGAP 5 02/23/2022    ANIONGAP 5 12/10/2020     (H) 05/24/2024     (H) 02/23/2022     (H) 12/10/2020    BUN 11.5 05/24/2024    BUN 15 02/23/2022    BUN 17 12/10/2020    CR 0.74 05/24/2024    CR 0.74 12/10/2020    GFRESTIMATED >90 05/24/2024    GFRESTIMATED >90 12/10/2020    GFRESTBLACK >90 12/10/2020    PAULA 9.4 05/24/2024    PAULA 9.4 12/10/2020        A1C RESULTS:  No results found for: \"A1C\"    INR RESULTS:  Lab Results   Component Value Date    INR 0.95 05/24/2024    INR 0.90 07/15/2013    INR 0.92 02/22/2010           CC  Saniya Bustillo, DO  [address]  [place] [address]                "

## 2024-06-02 LAB
ATRIAL RATE - MUSE: 52 BPM
DIASTOLIC BLOOD PRESSURE - MUSE: NORMAL MMHG
INTERPRETATION ECG - MUSE: NORMAL
P AXIS - MUSE: 67 DEGREES
PR INTERVAL - MUSE: 132 MS
QRS DURATION - MUSE: 86 MS
QT - MUSE: 402 MS
QTC - MUSE: 373 MS
R AXIS - MUSE: 57 DEGREES
SYSTOLIC BLOOD PRESSURE - MUSE: NORMAL MMHG
T AXIS - MUSE: 69 DEGREES
VENTRICULAR RATE- MUSE: 52 BPM

## 2024-06-03 ENCOUNTER — TELEPHONE (OUTPATIENT)
Dept: CARDIOLOGY | Facility: CLINIC | Age: 69
End: 2024-06-03
Payer: MEDICARE

## 2024-06-03 NOTE — TELEPHONE ENCOUNTER
Patient would like Dr. Bustillo to advise if cardiac rehab is needed as it was not discussed at his last OV.  Lyn Pineda RN on 6/3/2024 at 10:22 AM

## 2024-06-03 NOTE — TELEPHONE ENCOUNTER
[facility] Call Center    Phone Message    May a detailed message be left on voicemail: yes     Reason for Call: Other: Patient called requesting to speak with a member of his care team in regards to an upcoming cardiac rehab appt that was made without his knowledge. Patient would like to know if this appt is necessary. Please call back to further discuss and address.      Action Taken: Message routed to:  Other: Cardiology    Travel Screening: Not Applicable     Thank you!  [facility]

## 2024-06-04 NOTE — TELEPHONE ENCOUNTER
Saniya Bustillo DO Lidke, Jen M, RN  Caller: Unspecified (Yesterday, 10:05 AM)  Yes if he would like to    Patient declines cardiac rehab at this time.  Lyn Pineda, JIM on 6/4/2024 at 12:22 PM

## 2024-07-15 ENCOUNTER — NURSE TRIAGE (OUTPATIENT)
Dept: FAMILY MEDICINE | Facility: CLINIC | Age: 69
End: 2024-07-15
Payer: MEDICARE

## 2024-07-15 ENCOUNTER — ANCILLARY PROCEDURE (OUTPATIENT)
Dept: GENERAL RADIOLOGY | Facility: CLINIC | Age: 69
End: 2024-07-15
Attending: STUDENT IN AN ORGANIZED HEALTH CARE EDUCATION/TRAINING PROGRAM
Payer: MEDICARE

## 2024-07-15 ENCOUNTER — OFFICE VISIT (OUTPATIENT)
Dept: ORTHOPEDICS | Facility: CLINIC | Age: 69
End: 2024-07-15
Payer: MEDICARE

## 2024-07-15 VITALS
DIASTOLIC BLOOD PRESSURE: 79 MMHG | BODY MASS INDEX: 23.86 KG/M2 | SYSTOLIC BLOOD PRESSURE: 115 MMHG | WEIGHT: 152 LBS | HEIGHT: 67 IN

## 2024-07-15 DIAGNOSIS — M25.521 RIGHT ELBOW PAIN: ICD-10-CM

## 2024-07-15 DIAGNOSIS — M79.18 BRACHIORADIALIS MUSCLE TENDERNESS: Primary | ICD-10-CM

## 2024-07-15 DIAGNOSIS — M79.631 RIGHT FOREARM PAIN: ICD-10-CM

## 2024-07-15 PROCEDURE — 73080 X-RAY EXAM OF ELBOW: CPT | Mod: TC | Performed by: RADIOLOGY

## 2024-07-15 PROCEDURE — 99203 OFFICE O/P NEW LOW 30 MIN: CPT | Performed by: STUDENT IN AN ORGANIZED HEALTH CARE EDUCATION/TRAINING PROGRAM

## 2024-07-15 NOTE — LETTER
7/15/2024      Luis Miguel Barnett  [address] [facility] [address]      Dear Colleague,    Thank you for referring your patient, Luis Miguel Barnett, to the [facility]. Please see a copy of my visit note below.    ASSESSMENT & PLAN    Maxwell was seen today for pain.    Diagnoses and all orders for this visit:    Brachioradialis muscle tenderness  -     XR Elbow Right G/E 3 Views; Future    Right forearm pain      This issue is acute and NA. Maxwell presents to our walk-in clinic today to discuss his right forearm pain.  He was trying to lift and move a fridge and felt a strain in his right forearm with pain.  He has had no overlying swelling or cyst.  He points to the pain at the proximal forearm and it says it does radiate down into the hand and up slightly into the upper arm.  On exam, he has some tenderness palpation over the common extensor muscles and has pain with resisted supination.  He has no pain with resisted wrist flexion or extension.  Overall, these findings are consistent with the acute muscle strain, likely due to a brachial radialis strain.  We discussed that he has no signs of a large muscle tear or tendon tear, that this will likely improve with time.  We discussed using anti-inflammatory medicines in the acute phase for pain relief.  Discussed avoiding activities that cause pain to help the muscle heal.  We did discuss the possibility of a hand therapy referral to rehab from his acute strain, however the patient wished to defer this at this time.  We determined the following plan:  - Recommend the patient use over-the-counter pain medicines as needed  - Recommend he use Voltaren gel to the area, 2-3 times a day  -He should avoid painful activities  - He can follow-up in our clinic as needed      Thomas Young,   [facility]    -----  Chief Complaint   Patient presents with     Right Forearm - Pain  "      SUBJECTIVE  Luis Miguel Barnett is a/an 68 year old male who is seen as a self referral for evaluation of right lower arm pain.     The patient is seen by themselves.  The patient is Right handed    Onset: 3 hour(s) ago. Patient describes injury as trying to lift & move fridge  Location of Pain: right forearm radiating into elbow  Worsened by: rotating & twisting arm  Better with: rest  Treatments tried: none  Associated symptoms: no distal numbness or tingling; denies swelling or warmth    Orthopedic/Surgical history: NO  Social History/Occupation: retired, has a trip to [place] & [place] in a couple of weeks (also moving out of his house)      REVIEW OF SYSTEMS:  Review of systems negative unless mentioned in HPI     OBJECTIVE:  /79   Ht 1.702 m (5' 7\")   Wt 68.9 kg (152 lb)   BMI 23.81 kg/m     General: healthy, alert and in no distress  Skin: no suspicious lesions or rash.  CV: distal perfusion intact   Resp: normal respiratory effort without conversational dyspnea   Psych: normal mood and affect  Gait: NORMAL  Neuro: Normal light sensory exam of RU extremity     Focused Musculoskeletal Exam :   Elbow Exam    Left  Right   Alignment  Normal  Normal    Inspection Normal Normal   Palpation No tenderness over common extensor or lateral epicondyle, flexor/pronator mass or medial epicondyle, radial head, radial tunnel, or cubital tunnel.  TTP over common extensor muscle belly   Range of Motion     Flexion 140 140   Extension 0 0   Supination 0-80 0-80   Pronation 0-80 0-80   Strength Grossly normal Grossly normal   Pain with resisted wrist extension Negative Negative   Pain with resisted wrist flexion  Negative Negative   Pain with resisted pronation/supination Negative Positive, supination    Hook Test Negative Negative   Valgus stress testing Negative  Milking: no Negative  Milking: no   Other Special Tests  Able to make 'OK' sign (AIN)  Full thumb IP abduction strength (radial) Able to make 'OK' sign " (AIN)  Full thumb IP abduction strength (radial)   Sensation Intact Intact          RADIOLOGY:  Final results and radiologist's interpretation, available in the [facility] health record.  Images were reviewed with the patient in the office today.  My personal interpretation of the performed imaging: Normal joint spacing and alignment of the elbow.  No acute bony abnormalities.        Again, thank you for allowing me to participate in the care of your patient.        Sincerely,        Thomas Young, DO

## 2024-07-15 NOTE — TELEPHONE ENCOUNTER
"Situation   While he was lifting a refrigerator today he heard a pop in his right forearm     Assessment   Holding still there is minor pain  Moderate to severe pain in the right forearm and elbow with any movement    No visible deformity besides swelling .  No skin color change   \"Some swelling in right forearm and elbow     Recommendations   Ortho walk in clinic now in [place]- advised of location , patient agreeable to plan.    Reason for Disposition   Followed an injury to arm   Sounds like a serious injury to the triager   SEVERE pain    Additional Information   Negative: Shock suspected (e.g., cold/pale/clammy skin, too weak to stand, low BP, rapid pulse)   Negative: Similar pain previously and it was from 'heart attack'   Negative: Similar pain previously from 'angina' and not relieved by nitroglycerin   Negative: Sounds like a life-threatening emergency to the triager   Negative: Major bleeding (actively dripping or spurting) that can't be stopped   Negative: Serious injury with multiple fractures (broken bones)   Negative: Sounds like a life-threatening emergency to the triager   Negative: Bullet wound, stabbed by knife, or other serious penetrating wound   Negative: Looks like a broken bone or dislocated joint (crooked or deformed)   Negative: Can't move injured arm at all   Negative: Bleeding won't stop after 10 minutes of direct pressure (using correct technique)   Negative: Skin is split open or gaping (or length > 1/2 inch or 12 mm)   Negative: Dirt in the wound and not removed after 15 minutes of scrubbing   Negative: Wound looks infected   Negative: Arm pain from overuse (e.g., sports, lifting, physical work)   Negative: Arm pain not from an injury   Negative: Shoulder injury is main concern   Negative: Hand or wrist injury is main concern   Commented on: Can't move injured arm normally (bend or straighten completely)     Moderate to severe pain with movement    Protocols used: Arm Pain-A-OH, Arm " Injury-A-OH

## 2024-07-15 NOTE — PROGRESS NOTES
ASSESSMENT & PLAN    Maxwell was seen today for pain.    Diagnoses and all orders for this visit:    Brachioradialis muscle tenderness  -     XR Elbow Right G/E 3 Views; Future    Right forearm pain      This issue is acute and NA. Maxwell presents to our walk-in clinic today to discuss his right forearm pain.  He was trying to lift and move a fridge and felt a strain in his right forearm with pain.  He has had no overlying swelling or cyst.  He points to the pain at the proximal forearm and it says it does radiate down into the hand and up slightly into the upper arm.  On exam, he has some tenderness palpation over the common extensor muscles and has pain with resisted supination.  He has no pain with resisted wrist flexion or extension.  Overall, these findings are consistent with the acute muscle strain, likely due to a brachial radialis strain.  We discussed that he has no signs of a large muscle tear or tendon tear, that this will likely improve with time.  We discussed using anti-inflammatory medicines in the acute phase for pain relief.  Discussed avoiding activities that cause pain to help the muscle heal.  We did discuss the possibility of a hand therapy referral to rehab from his acute strain, however the patient wished to defer this at this time.  We determined the following plan:  - Recommend the patient use over-the-counter pain medicines as needed  - Recommend he use Voltaren gel to the area, 2-3 times a day  -He should avoid painful activities  - He can follow-up in our clinic as needed      Thomas Young DO  [facility] [place]    -----  Chief Complaint   Patient presents with    Right Forearm - Pain       SUBJECTIVE  Luis Miguel NADEGE Anibal is a/an 68 year old male who is seen as a self referral for evaluation of right lower arm pain.     The patient is seen by themselves.  The patient is Right handed    Onset: 3 hour(s) ago. Patient describes injury as trying to lift & move  "fridge  Location of Pain: right forearm radiating into elbow  Worsened by: rotating & twisting arm  Better with: rest  Treatments tried: none  Associated symptoms: no distal numbness or tingling; denies swelling or warmth    Orthopedic/Surgical history: NO  Social History/Occupation: retired, has a trip to [place] & [place] in a couple of weeks (also moving out of his house)      REVIEW OF SYSTEMS:  Review of systems negative unless mentioned in HPI     OBJECTIVE:  /79   Ht 1.702 m (5' 7\")   Wt 68.9 kg (152 lb)   BMI 23.81 kg/m     General: healthy, alert and in no distress  Skin: no suspicious lesions or rash.  CV: distal perfusion intact   Resp: normal respiratory effort without conversational dyspnea   Psych: normal mood and affect  Gait: NORMAL  Neuro: Normal light sensory exam of RU extremity     Focused Musculoskeletal Exam :   Elbow Exam    Left  Right   Alignment  Normal  Normal    Inspection Normal Normal   Palpation No tenderness over common extensor or lateral epicondyle, flexor/pronator mass or medial epicondyle, radial head, radial tunnel, or cubital tunnel.  TTP over common extensor muscle belly   Range of Motion     Flexion 140 140   Extension 0 0   Supination 0-80 0-80   Pronation 0-80 0-80   Strength Grossly normal Grossly normal   Pain with resisted wrist extension Negative Negative   Pain with resisted wrist flexion  Negative Negative   Pain with resisted pronation/supination Negative Positive, supination    Hook Test Negative Negative   Valgus stress testing Negative  Milking: no Negative  Milking: no   Other Special Tests  Able to make 'OK' sign (AIN)  Full thumb IP abduction strength (radial) Able to make 'OK' sign (AIN)  Full thumb IP abduction strength (radial)   Sensation Intact Intact          RADIOLOGY:  Final results and radiologist's interpretation, available in the [facility] health record.  Images were reviewed with the patient in the office today.  My personal interpretation of the " performed imaging: Normal joint spacing and alignment of the elbow.  No acute bony abnormalities.

## 2024-08-19 ENCOUNTER — OFFICE VISIT (OUTPATIENT)
Dept: ORTHOPEDICS | Facility: CLINIC | Age: 69
End: 2024-08-19
Payer: MEDICARE

## 2024-08-19 VITALS
WEIGHT: 153 LBS | HEIGHT: 67 IN | DIASTOLIC BLOOD PRESSURE: 68 MMHG | BODY MASS INDEX: 24.01 KG/M2 | SYSTOLIC BLOOD PRESSURE: 110 MMHG

## 2024-08-19 DIAGNOSIS — M79.18 BRACHIORADIALIS MUSCLE TENDERNESS: Primary | ICD-10-CM

## 2024-08-19 DIAGNOSIS — M77.11 LATERAL EPICONDYLITIS OF RIGHT ELBOW: ICD-10-CM

## 2024-08-19 DIAGNOSIS — M77.01 MEDIAL EPICONDYLITIS OF RIGHT ELBOW: ICD-10-CM

## 2024-08-19 PROCEDURE — 99213 OFFICE O/P EST LOW 20 MIN: CPT | Performed by: STUDENT IN AN ORGANIZED HEALTH CARE EDUCATION/TRAINING PROGRAM

## 2024-08-19 NOTE — LETTER
8/19/2024      Luis Miguel Barnett  [address] [facility] [address]      Dear Colleague,    Thank you for referring your patient, Luis Miguel Barnett, to the [facility]. Please see a copy of my visit note below.    ASSESSMENT & PLAN    Maxwell was seen today for recheck.    Diagnoses and all orders for this visit:    Brachioradialis muscle tenderness  -     Hand Therapy Referral; Future    Lateral epicondylitis of right elbow  -     Hand Therapy Referral; Future    Medial epicondylitis of right elbow  -     Hand Therapy Referral; Future      This issue is acute and Unchanged. Maxwell presents to our clinic today to follow-up on his acute right elbow pain.  At the last visit he had an acute brachioradialis strain with coinciding medial and lateral epicondylitis.  At that point, we have discussed that his injury was acute and would likely improve with time, however unfortunately this has not been the case.  We discussed that the next step in treatment would be dedicated hand therapy to help rehab from his acute injury, and strengthen the muscles and tendons of the elbow.  We also discussed procedural intervention in the form of either a needle tenotomy or Tenex, but discussed that these interventions would require dedicated rehab afterwards as well.  We determined the following plan:  - Hand therapy referral placed today  - He can continue use over-the-counter pain medicines, ice, heat as needed  - Recommend he continue use Voltaren gel, 3-4 times a day daily  - He can follow-up in our clinic in 6 weeks if not improving    Thomas Young,   [facility]    SUBJECTIVE- Interim History August 19, 2024    Chief Complaint   Patient presents with     Right Elbow - RECHECK       Luis Miguel Barnett is a 69 year old male who is seen in f/u up for    Brachioradialis muscle tenderness  Lateral epicondylitis of right elbow  Medial epicondylitis of right  "elbow. Since last visit on 7/15/24 patient feels about the same. He notes pain in right forearm, right posterior elbow, right wrist, and occasionally radiating into right shoulder  - Now ~ 5 weeks from initial onset    Worsened by: swimming, shaving, brushing teeth, supination, lifting  Better with: Voltaren gel  Treatments tried: rest/activity avoidance, Tylenol, other medications: Voltaren gel, and previous imaging (xray right elbow 7/15/24)  Associated symptoms:  burning / pulling sensation in right forearm    The patient is seen by themselves.  The patient is Right handed    Orthopedic/Surgical history: NO  Social History/Occupation: retired,       REVIEW OF SYSTEMS:  Negative unless mentioned above    OBJECTIVE:  /68   Ht 1.702 m (5' 7\")   Wt 69.4 kg (153 lb)   BMI 23.96 kg/m     General: healthy, alert and in no distress  Skin: no suspicious lesions or rash.  CV: distal perfusion intact   Resp: normal respiratory effort without conversational dyspnea   Psych: normal mood and affect  Gait: NORMAL  Neuro: Normal light sensory exam of RU extremity     Focused Musculoskeletal Exam :   Elbow Exam    Left  Right   Alignment  Normal  Normal    Inspection Normal Normal   Palpation No tenderness over common extensor or lateral epicondyle, flexor/pronator mass or medial epicondyle, radial head, radial tunnel, or cubital tunnel.  TTP lateral and medial epicondyle   Range of Motion     Flexion 140 140   Extension 0 0   Supination 0-80 0-80   Pronation 0-80 0-80   Strength Grossly normal Grossly normal   Pain with resisted wrist extension Negative POS   Pain with resisted wrist flexion  Negative POS   Pain with resisted pronation/supination Negative POS, both    Hook Test Negative Negative   Valgus stress testing Negative  Milking: no Negative  Milking: no   Other Special Tests  Able to make 'OK' sign (AIN)  Full thumb IP abduction strength (radial) Able to make 'OK' sign (AIN)  Full thumb IP abduction strength " (radial)   Sensation Intact Intact         RADIOLOGY:  No new imaging taken in clinic today      Again, thank you for allowing me to participate in the care of your patient.        Sincerely,        Thomas Young, DO

## 2024-08-19 NOTE — PROGRESS NOTES
ASSESSMENT & PLAN    Maxwell was seen today for recheck.    Diagnoses and all orders for this visit:    Brachioradialis muscle tenderness  -     Hand Therapy Referral; Future    Lateral epicondylitis of right elbow  -     Hand Therapy Referral; Future    Medial epicondylitis of right elbow  -     Hand Therapy Referral; Future      This issue is acute and Unchanged. Maxwell presents to our clinic today to follow-up on his acute right elbow pain.  At the last visit he had an acute brachioradialis strain with coinciding medial and lateral epicondylitis.  At that point, we have discussed that his injury was acute and would likely improve with time, however unfortunately this has not been the case.  We discussed that the next step in treatment would be dedicated hand therapy to help rehab from his acute injury, and strengthen the muscles and tendons of the elbow.  We also discussed procedural intervention in the form of either a needle tenotomy or Tenex, but discussed that these interventions would require dedicated rehab afterwards as well.  We determined the following plan:  - Hand therapy referral placed today  - He can continue use over-the-counter pain medicines, ice, heat as needed  - Recommend he continue use Voltaren gel, 3-4 times a day daily  - He can follow-up in our clinic in 6 weeks if not improving    Thomas Young, [facility] [place]    SUBJECTIVE- Interim History August 19, 2024    Chief Complaint   Patient presents with    Right Elbow - RECHECK       Luis Miguel NADEGE Barnett is a 69 year old male who is seen in f/u up for    Brachioradialis muscle tenderness  Lateral epicondylitis of right elbow  Medial epicondylitis of right elbow. Since last visit on 7/15/24 patient feels about the same. He notes pain in right forearm, right posterior elbow, right wrist, and occasionally radiating into right shoulder  - Now ~ 5 weeks from initial onset    Worsened by: swimming, shaving, brushing  "teeth, supination, lifting  Better with: Voltaren gel  Treatments tried: rest/activity avoidance, Tylenol, other medications: Voltaren gel, and previous imaging (xray right elbow 7/15/24)  Associated symptoms:  burning / pulling sensation in right forearm    The patient is seen by themselves.  The patient is Right handed    Orthopedic/Surgical history: NO  Social History/Occupation: retired,       REVIEW OF SYSTEMS:  Negative unless mentioned above    OBJECTIVE:  /68   Ht 1.702 m (5' 7\")   Wt 69.4 kg (153 lb)   BMI 23.96 kg/m     General: healthy, alert and in no distress  Skin: no suspicious lesions or rash.  CV: distal perfusion intact   Resp: normal respiratory effort without conversational dyspnea   Psych: normal mood and affect  Gait: NORMAL  Neuro: Normal light sensory exam of RU extremity     Focused Musculoskeletal Exam :   Elbow Exam    Left  Right   Alignment  Normal  Normal    Inspection Normal Normal   Palpation No tenderness over common extensor or lateral epicondyle, flexor/pronator mass or medial epicondyle, radial head, radial tunnel, or cubital tunnel.  TTP lateral and medial epicondyle   Range of Motion     Flexion 140 140   Extension 0 0   Supination 0-80 0-80   Pronation 0-80 0-80   Strength Grossly normal Grossly normal   Pain with resisted wrist extension Negative POS   Pain with resisted wrist flexion  Negative POS   Pain with resisted pronation/supination Negative POS, both    Hook Test Negative Negative   Valgus stress testing Negative  Milking: no Negative  Milking: no   Other Special Tests  Able to make 'OK' sign (AIN)  Full thumb IP abduction strength (radial) Able to make 'OK' sign (AIN)  Full thumb IP abduction strength (radial)   Sensation Intact Intact         RADIOLOGY:  No new imaging taken in clinic today  "

## 2024-09-06 ENCOUNTER — THERAPY VISIT (OUTPATIENT)
Dept: OCCUPATIONAL THERAPY | Facility: CLINIC | Age: 69
End: 2024-09-06
Attending: STUDENT IN AN ORGANIZED HEALTH CARE EDUCATION/TRAINING PROGRAM
Payer: MEDICARE

## 2024-09-06 DIAGNOSIS — M79.18 BRACHIORADIALIS MUSCLE TENDERNESS: Primary | ICD-10-CM

## 2024-09-06 DIAGNOSIS — M77.11 LATERAL EPICONDYLITIS OF RIGHT ELBOW: ICD-10-CM

## 2024-09-06 DIAGNOSIS — M77.01 MEDIAL EPICONDYLITIS OF RIGHT ELBOW: ICD-10-CM

## 2024-09-06 PROCEDURE — 97530 THERAPEUTIC ACTIVITIES: CPT | Mod: GO | Performed by: OCCUPATIONAL THERAPIST

## 2024-09-06 PROCEDURE — 97165 OT EVAL LOW COMPLEX 30 MIN: CPT | Mod: GO | Performed by: OCCUPATIONAL THERAPIST

## 2024-09-06 PROCEDURE — 97112 NEUROMUSCULAR REEDUCATION: CPT | Mod: GO | Performed by: OCCUPATIONAL THERAPIST

## 2024-09-06 NOTE — PROGRESS NOTES
OCCUPATIONAL THERAPY EVALUATION  Type of Visit: Evaluation       Fall Risk Screen:  Fall screen completed by: OT  Have you fallen 2 or more times in the past year?: No  Have you fallen and had an injury in the past year?: No  Is patient a fall risk?: No    Subjective      Presenting condition or subjective complaint: tendon pain in right arm  Date of onset: 08/19/24 (order date)    Relevant medical history:     Dates & types of surgery: heart issues since 2007 i currently have 5 stens &in May of   2024 had another procedure to open up somw arteries    Prior diagnostic imaging/testing results: X-ray     Prior therapy history for the same diagnosis, illness or injury:        Prior Level of Function  Transfers: Independent  Ambulation: Independent  ADL: Independent  IADL:     Living Environment  Social support: With a significant other or spouse   Type of home: House   Stairs to enter the home:         Ramp: No   Stairs inside the home: Yes 16 Is there a railing: Yes     Help at home: None  Equipment owned:       Employment: No    Hobbies/Interests:      Patient goals for therapy: numerous things i cant do now     Objective   ADDITIONAL HISTORY:  Right hand dominant  Patient reports symptoms of pain, stiffness/loss of motion, and weakness/loss of strength  Transportation: drives  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, 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    Functional Outcome Measure:   Upper Extremity Functional Index Score:  SCORE:   Column Totals: /80: 55   (A lower score indicates greater disability.)    Pain Level (Scale 0-10)   9/6/2024   At Rest 0/10, sometimes 3-4/10 depends on the day   With Use 6-8/10     Pain Description  Date 9/6/2024   Location shoulder, elbow, and wrist  LEP, biceps insertion   Pain Quality Aching, Sharp, and Shooting   Frequency intermittent     Pain is worst  daytime   Exacerbated by  Grabbing an ipad, Reaching, pulling, pushing, gripping, twisting, sudden movements,  vacuuming  -Depends on the day, Some days are good, other times it is aching   Relieved by rest and none   Progression Gradually improving but still painful     Sensation  WNL throughout all nerve distributions; per patient report    ROM  WNL per visual observation    Resisted Testing  Pain Report:  - none    + mild    ++ moderate    +++ severe    9/6/2024   Elbow Extension 5/5 -   Elbow Flexion 4/5 +   Supination  3+/5 ++/+++   Supination with elbow extended 4-/5 ++   Pronation 5/5 -   Wrist Ext with RD, Elbow at side 4+/5    Wrist Ext with UD, Elbow at side 4+/5    Wrist Flex with RD, Elbow at side 5/5 +   Wrist Flex with UD, Elbow at side 5/5 +   Shoulder abduction 4+/5   Yergason Test - pain on the dorsum of forearm     Strength  NT    Palpation  Pain Report:  - none    + mild    ++ moderate    +++ severe    9/6/2024   Pronator Teres +   Flexor Wad +   Bicipital aponeurosis +   MEP +   Brachioradialis +   ECRB at LEP -   ECU at LEP -   EDC at LEP -   Radial Head +   Extensor Wad +   PIN Site +     Assessment & Plan   CLINICAL IMPRESSIONS  Medical Diagnosis: Brachioradialis muscle tenderness  Lateral epicondylitis of right elbow  Medial epicondylitis of right elbow    Treatment Diagnosis: R elbow pain    Impression/Assessment: Pt is a 69 year old male presenting to Occupational Therapy due to muscle pull when moving a refrigerator.  The following significant findings have been identified: Impaired activity tolerance, Impaired strength, and Pain.  These identified deficits interfere with their ability to perform self care tasks, work tasks, recreational activities, household chores, driving , and meal planning and preparation as compared to previous level of function.     Clinical Decision Making (Complexity):  Assessment of Occupational Performance: 5 or more Performance Deficits  Occupational Performance Limitations: bathing/showering, toileting, dressing, feeding, functional mobility, hygiene and grooming,  driving and community mobility, health management and maintenance, home establishment and management, meal preparation and cleanup, shopping, sleep, and leisure activities  Clinical Decision Making (Complexity): Low complexity    PLAN OF CARE  Treatment Interventions:  Modalities:  US and Paraffin  Therapeutic Exercise:  AROM, PROM, Tendon Gliding, Isotonics, Isometrics and Stabilization  Neuromuscular re-education:  Nerve Gliding, Proprioceptive Training, Kinesiotaping, Strain Counter Strain  Manual Techniques:  Friction massage, Myofascial release, and Manual edema mobilization  Orthotic Fabrication:  Static and Long arm  Self Care:  Self Care Tasks and Ergonomic Considerations    Long Term Goals   OT Goal 2  Goal Identifier: Household IADLs - carrying a shopping bag  Goal Description: No difficulty carrying a shopping bag equal to or greater than 10lb  Rationale: In order to maximize safety and independence with performance of self-care activities  Target Date: 11/06/24      Frequency of Treatment: 2x/month  Duration of Treatment: 2 months     Recommended Referrals to Other Professionals:   Education Assessment: Learner/Method: Patient;Demonstration  Education Comments: PTRX on phone     Risks and benefits of evaluation/treatment have been explained.   Patient/Family/caregiver agrees with Plan of Care.     Evaluation Time:    OT Eval, Low Complexity Minutes (03311): 30       Signing Clinician: KIANNA Jimenez [facility]                                                                                   OUTPATIENT OCCUPATIONAL THERAPY      PLAN OF TREATMENT FOR OUTPATIENT REHABILITATION   Patient's Last Name, First Name, Luis Miguel Romero YOB: 1955   Provider's Name   [facility]   Medical Record No.  [id number]     Onset Date: 08/19/24 (order date) Start of Care Date: 09/06/24     Medical Diagnosis:  Brachioradialis muscle  tenderness  Lateral epicondylitis of right elbow  Medial epicondylitis of right elbow      OT Treatment Diagnosis:  R elbow pain Plan of Treatment  Frequency/Duration:2x/month/2 months    Certification date from 09/06/24   To 11/06/24        See note for plan of treatment details and functional goals     Luda Augustine OT                         I CERTIFY THE NEED FOR THESE SERVICES FURNISHED UNDER        THIS PLAN OF TREATMENT AND WHILE UNDER MY CARE     (Physician attestation of this document indicates review and certification of the therapy plan).              Referring Provider:  Thomas Young    Initial Assessment  See Epic Evaluation- 09/06/24

## 2024-10-18 PROBLEM — M79.18 BRACHIORADIALIS MUSCLE TENDERNESS: Status: RESOLVED | Noted: 2024-09-06 | Resolved: 2024-10-18

## 2024-10-18 PROBLEM — M77.01 MEDIAL EPICONDYLITIS OF RIGHT ELBOW: Status: RESOLVED | Noted: 2024-09-06 | Resolved: 2024-10-18

## 2024-10-18 PROBLEM — M77.11 LATERAL EPICONDYLITIS OF RIGHT ELBOW: Status: RESOLVED | Noted: 2024-09-06 | Resolved: 2024-10-18

## 2024-10-23 ENCOUNTER — TELEPHONE (OUTPATIENT)
Dept: CARDIOLOGY | Facility: CLINIC | Age: 69
End: 2024-10-23
Payer: MEDICARE

## 2024-10-23 NOTE — TELEPHONE ENCOUNTER
Health Call Center    Phone Message    May a detailed message be left on voicemail: yes     Reason for Call: Other: Maxwell called requesting to speak with his care team about his follow up with Dr. Bustillo that was requested for next year. Please reach out to Maxwell to discuss. Thank you!     Action Taken: Other: Cardiology    Travel Screening: Not Applicable    Thank you!  [facility]       Date of Service:

## 2024-10-23 NOTE — TELEPHONE ENCOUNTER
Patient returned call and wanted to know when he is due for follow up. Reviewed last OV note 5/30/24 which recommended follow up with stess echo beforehand in one year.  Patient verbalized understanding.  Lyn Pineda RN on 10/23/2024 at 3:31 PM

## 2024-10-23 NOTE — TELEPHONE ENCOUNTER
Message left for patient to return call to RN at [phone].  Lyn Pineda RN on 10/23/2024 at 3:19 PM

## 2024-12-05 ENCOUNTER — TELEPHONE (OUTPATIENT)
Dept: CARDIOLOGY | Facility: CLINIC | Age: 69
End: 2024-12-05
Payer: MEDICARE

## 2024-12-05 NOTE — TELEPHONE ENCOUNTER
Dr. Bustillo to advise on stopping Plavix for 7-10 for PRP injection due to torn tendon.  Patient underwent successful IVUS guided percutaneous coronary intervention of the diagonal vessel's in-stent restenosis with balloon angioplasty using emerge noncompliant 3.0 X12 millimeter balloon on 5/24/24.  Per last OV note recommendation:    1.  Coronary artery disease with recent revascularization of both LAD and diagonal 1 due to severe in-stent restenosis.  I will likely want to keep him on DAPT for minimum of 18 months given the severe in-stent restenosis in the same areas as previously noted.  There is also very proximal or ostial diagonal 1 area that looks slightly pinched post procedure.  It appears that this stent does not extend into the ostium of this vessel.  TIAN III flow was noted down the vessel post procedure.  I will recommend we continue with annual stress echocardiogram as this has been very helpful in diagnosing restenosis for him.

## 2024-12-05 NOTE — TELEPHONE ENCOUNTER
Health Call Center    Phone Message    May a detailed message be left on voicemail: yes     Reason for Call: Other: Pt would like to speak to someone from their care team. Pt stated they tore a tendon and is needing a PRP injection but was told they will need to be off their Plavix for 7-10day and pt is just wondering if this was something they could do. Please call the pt at [phone] to further discuss.      Action Taken: Other: Cardiology     Travel Screening: Not Applicable     Thank you!  [facility]

## 2024-12-10 NOTE — TELEPHONE ENCOUNTER
Saniya Bustillo DO Lidke, Jen M, RN  Caller: Unspecified (5 days ago,  1:22 PM)  He shouldn't have to stop it for that period of time for a tendon injection. 3 days in advance and 2 days after is fine.    Spoke with patient and let him know the recommendations.  Patient will communicate with his orthopedic provider.  Lyn Pineda RN on 12/10/2024 at 11:48 AM

## 2025-05-16 ENCOUNTER — TELEPHONE (OUTPATIENT)
Dept: CARDIOLOGY | Facility: CLINIC | Age: 70
End: 2025-05-16

## 2025-05-16 NOTE — TELEPHONE ENCOUNTER
[facility] Call Center    Phone Message    May a detailed message be left on voicemail: yes     Reason for Call: Pt is trying to clarify if/ when he stops plavix for stress echo coming up on 5/30.  Pt is wondering if instructions are being mailed out.  Please check.    Action Taken: Other: cardio    Travel Screening: Not Applicable     Date of Service:

## 2025-05-16 NOTE — TELEPHONE ENCOUNTER
Message left for patient to return call to RN at [phone]  Lyn Pineda RN on 5/16/2025 at 2:45 PM

## 2025-05-30 ENCOUNTER — HOSPITAL ENCOUNTER (OUTPATIENT)
Dept: CARDIOLOGY | Facility: CLINIC | Age: 70
Discharge: HOME | End: 2025-05-30
Attending: INTERNAL MEDICINE | Admitting: INTERNAL MEDICINE
Payer: MEDICARE

## 2025-05-30 ENCOUNTER — RESULTS FOLLOW-UP (OUTPATIENT)
Dept: CARDIOLOGY | Facility: CLINIC | Age: 70
End: 2025-05-30

## 2025-05-30 DIAGNOSIS — Z98.61 S/P PTCA (PERCUTANEOUS TRANSLUMINAL CORONARY ANGIOPLASTY): ICD-10-CM

## 2025-05-30 DIAGNOSIS — I10 BENIGN ESSENTIAL HYPERTENSION: ICD-10-CM

## 2025-05-30 DIAGNOSIS — I25.10 CORONARY ARTERY DISEASE INVOLVING NATIVE CORONARY ARTERY OF NATIVE HEART WITHOUT ANGINA PECTORIS: ICD-10-CM

## 2025-05-30 DIAGNOSIS — E78.00 PURE HYPERCHOLESTEROLEMIA: ICD-10-CM

## 2025-05-30 PROCEDURE — 255N000002 HC RX 255 OP 636: Performed by: INTERNAL MEDICINE

## 2025-05-30 PROCEDURE — 999N000208 ECHO STRESS ECHOCARDIOGRAM

## 2025-05-30 RX ADMIN — HUMAN ALBUMIN MICROSPHERES AND PERFLUTREN 4 ML: 10; .22 INJECTION, SOLUTION INTRAVENOUS at 09:24

## 2025-06-03 ENCOUNTER — OFFICE VISIT (OUTPATIENT)
Dept: CARDIOLOGY | Facility: CLINIC | Age: 70
End: 2025-06-03
Payer: MEDICARE

## 2025-06-03 VITALS
HEIGHT: 67 IN | BODY MASS INDEX: 25.58 KG/M2 | DIASTOLIC BLOOD PRESSURE: 70 MMHG | SYSTOLIC BLOOD PRESSURE: 110 MMHG | HEART RATE: 66 BPM | WEIGHT: 163 LBS

## 2025-06-03 DIAGNOSIS — Z98.61 S/P PTCA (PERCUTANEOUS TRANSLUMINAL CORONARY ANGIOPLASTY): ICD-10-CM

## 2025-06-03 DIAGNOSIS — E78.00 PURE HYPERCHOLESTEROLEMIA: ICD-10-CM

## 2025-06-03 DIAGNOSIS — I10 BENIGN ESSENTIAL HYPERTENSION: ICD-10-CM

## 2025-06-03 DIAGNOSIS — I25.10 CORONARY ARTERY DISEASE INVOLVING NATIVE CORONARY ARTERY OF NATIVE HEART WITHOUT ANGINA PECTORIS: ICD-10-CM

## 2025-06-03 RX ORDER — EZETIMIBE AND SIMVASTATIN 10; 40 MG/1; MG/1
1 TABLET ORAL AT BEDTIME
Qty: 90 TABLET | Refills: 4 | Status: SHIPPED | OUTPATIENT
Start: 2025-06-03

## 2025-06-03 RX ORDER — METOPROLOL SUCCINATE 25 MG/1
25 TABLET, EXTENDED RELEASE ORAL DAILY
Qty: 90 TABLET | Refills: 4 | Status: SHIPPED | OUTPATIENT
Start: 2025-06-03

## 2025-06-03 RX ORDER — LISINOPRIL 5 MG/1
5 TABLET ORAL DAILY
Qty: 90 TABLET | Refills: 4 | Status: SHIPPED | OUTPATIENT
Start: 2025-06-03

## 2025-06-03 RX ORDER — CLOPIDOGREL BISULFATE 75 MG/1
TABLET ORAL
Qty: 90 TABLET | Refills: 4 | Status: SHIPPED | OUTPATIENT
Start: 2025-06-03

## 2025-06-03 NOTE — LETTER
6/3/2025    Tommie King MD  [address] [facility] [address]    RE: Luis Miguel Barnett       Dear Colleague,     I had the pleasure of seeing Luis Miguel Barnett in the [facility].  HPI and Plan:   Luis Miguel Barnett is a 69 year old male who presents with history of coronary artery disease with multivessel stenting, most recently underwent PCI with balloon angioplasty to his LAD and diagonal for severe in-stent restenosis.  It was felt that the stents were underexpanded, the interventionalists used a noncompliant balloon with good result to both areas, however stents were not deployed, he only used angioplasty this time.  He did have a stress echocardiogram this may to monitor for recurrent ischemic disease.  He walked for 9 minutes on a Vipin protocol with similar ECG changes to previous studies, but no evidence of regional wall motion abnormality and appropriate augmentation of LV function poststress.  He did not have any symptoms during the stress test.  He feels well, continues to travel frequently.    Exam today is normal    Summary    1.  Coronary artery disease with multivessel stenting, he has had issue with proximal LAD into the first diagonal on several occasions over the last 10 to 15 years.  He underwent angioplasty due to severe in-stent restenosis last May to this area.  I would like him to continue on dual antiplatelet therapy for 2 years.  I will also continue to follow him with an annual stress echocardiogram.  I did renew all of his medications today    I am happy to see him back annually or as needed, please feel free to contact me with any questions given aggressive risk         Today's clinic visit entailed:  Review of the result(s) of each unique test - stress echo  Ordering of each unique test  Prescription drug management    Provider  Link to [facility] Help Grid     The level of medical decision making during this visit was of moderate complexity.    Orders Placed This  Encounter   Procedures     Basic metabolic panel     Lipid Profile     ALT     Follow-Up with Cardiology     Exercise Stress Echocardiogram     Orders Placed This Encounter   Medications     ezetimibe-simvastatin (VYTORIN) 10-40 MG tablet     Sig: Take 1 tablet by mouth at bedtime.     Dispense:  90 tablet     Refill:  4     clopidogrel (PLAVIX) 75 MG tablet     Sig: Take one tablet (75 mg) by mouth daily     Dispense:  90 tablet     Refill:  4     lisinopril (ZESTRIL) 5 MG tablet     Sig: Take 1 tablet (5 mg) by mouth daily.     Dispense:  90 tablet     Refill:  4     metoprolol succinate ER (TOPROL XL) 25 MG 24 hr tablet     Sig: Take 1 tablet (25 mg) by mouth daily. Appointment needed for further refills     Dispense:  90 tablet     Refill:  4     Medications Discontinued During This Encounter   Medication Reason     metoprolol succinate ER (TOPROL XL) 25 MG 24 hr tablet Reorder (No AVS)     lisinopril (ZESTRIL) 5 MG tablet Reorder (No AVS)     clopidogrel (PLAVIX) 75 MG tablet Reorder (No AVS)     ezetimibe-simvastatin (VYTORIN) 10-40 MG tablet Reorder (No AVS)         Encounter Diagnoses   Name Primary?     Coronary artery disease involving native coronary artery of native heart without angina pectoris      Pure hypercholesterolemia      Benign essential hypertension      S/P PTCA (percutaneous transluminal coronary angioplasty)        CURRENT MEDICATIONS:  Current Outpatient Medications   Medication Sig Dispense Refill     clopidogrel (PLAVIX) 75 MG tablet Take one tablet (75 mg) by mouth daily 90 tablet 4     ezetimibe-simvastatin (VYTORIN) 10-40 MG tablet Take 1 tablet by mouth at bedtime. 90 tablet 4     lisinopril (ZESTRIL) 5 MG tablet Take 1 tablet (5 mg) by mouth daily. 90 tablet 4     metoprolol succinate ER (TOPROL XL) 25 MG 24 hr tablet Take 1 tablet (25 mg) by mouth daily. Appointment needed for further refills 90 tablet 4     ascorbic acid (VITAMIN C) 500 MG tablet Take by mouth daily       ASPIRIN  81 MG OR TABS ONE DAILY 100 3     benzonatate (TESSALON) 100 MG capsule Take 1 capsule (100 mg) by mouth 3 times daily as needed for cough (Patient not taking: Reported on 6/3/2025) 30 capsule 0     diazepam (VALIUM) 5 MG tablet Take 1 tablet (5 mg) by mouth every 6 hours as needed for anxiety (Patient not taking: Reported on 6/3/2025) 4 tablet 0     Multiple Vitamin (MULTIVITAMINS PO) Take 1 tablet by mouth daily       nitroGLYcerin (NITROSTAT) 0.4 MG sublingual tablet Place 1 tablet (0.4 mg) under the tongue every 5 minutes as needed for chest pain 25 tablet 2     Throat Lozenges (COUGH DROPS MT) Take by mouth as needed        UNABLE TO FIND Pure ZZZ         ALLERGIES   No Known Allergies    PAST MEDICAL HISTORY:  Past Medical History:   Diagnosis Date     Antiplatelet or antithrombotic long-term use      Coronary artery disease 2007    multivessel stenting - JULIETA mid LAD x2 and JULIETA 1st diagonal, 2008 complete obstruction 1st diagonal with successful thrombectomy and JULIETA, 2013 in stent restenosis mid LAD (25%), prox to LAD stent had in segment restenosis of 50%, 50-70% in stent restenosis diagonal     Hypertension      Presbyopia      Pure hypercholesterolemia      Stented coronary artery      Unspecified cardiovascular disease 6/07, 11/08       PAST SURGICAL HISTORY:  Past Surgical History:   Procedure Laterality Date     CV CORONARY ANGIOGRAM N/A 5/24/2024    Procedure: Coronary Angiogram;  Surgeon: Reid Davidson MD;  Location:  [facility]     CV INTRAVASULAR ULTRASOUND N/A 5/24/2024    Procedure: Intravascular Ultrasound;  Surgeon: Reid Davidson MD;  Location:  [facility]     CV PCI ANGIOPLASTY N/A 5/24/2024    Procedure: Percutaneous Transluminal Angioplasty;  Surgeon: Reid Davidson MD;  Location:  [facility]     HEART CATH, ANGIOPLASTY      multivessel stenting - JULIETA mid LAD x2 and JULIETA 1st diagonal, 2008 complete obstruction 1st diagonal with successful  thrombectomy and JULIETA, 2013 in stent restenosis mid LAD (25%), prox to LAD stent had in segment restenosis of 50%, 50-70% in stent restenosis diagonal     HERNIORRHAPHY INGUINAL  2012    Procedure: HERNIORRHAPHY INGUINAL;  left inguinal hernia repair with Mesh ;  Surgeon: Cam Lancaster MD;  Location: [facility] OR     SURGICAL HISTORY OF -       s/p vasectomy      SURGICAL HISTORY OF -   , , 2/10    stent x 2 - LAD/Diagonal - Dr Gastelum - stent  - mid LAD     SURGICAL HISTORY OF -   10/10    stress echo normal       FAMILY HISTORY:  Family History   Problem Relation Age of Onset     Alzheimer Disease Mother      Heart Disease Father         MI     C.A.D. Brother 48        Stents     Family History Negative Other        SOCIAL HISTORY:  Social History     Socioeconomic History     Marital status:      Spouse name: None     Number of children: None     Years of education: None     Highest education level: None   Tobacco Use     Smoking status: Former     Current packs/day: 0.00     Average packs/day: 1 pack/day for 30.2 years (30.2 ttl pk-yrs)     Types: Cigarettes     Start date:      Quit date: 2001     Years since quittin.1     Smokeless tobacco: Never     Tobacco comments:     quit 2001   Substance and Sexual Activity     Alcohol use: No     Alcohol/week: 0.0 standard drinks of alcohol     Drug use: No     Sexual activity: Yes   Other Topics Concern     Parent/sibling w/ CABG, MI or angioplasty before 65F 55M? Yes     Caffeine Concern Yes     Comment: 1 soda a day     Sleep Concern No     Comment: does not sleep well some nights     Stress Concern No     Special Diet No     Exercise Yes     Comment: Walking 2 days per week, working PT     Seat Belt Yes     Social Drivers of Health     Interpersonal Safety: Low Risk  (2024)    Interpersonal Safety      Do you feel physically and emotionally safe where you currently live?: Yes      Within the past 12 months, have you been  "hit, slapped, kicked or otherwise physically hurt by someone?: No      Within the past 12 months, have you been humiliated or emotionally abused in other ways by your partner or ex-partner?: No       Review of Systems:  Skin:        Eyes:       ENT:       Respiratory:  Negative    Cardiovascular:  Negative    Gastroenterology:      Genitourinary:       Musculoskeletal:       Neurologic:       Psychiatric:       Heme/Lymph/Imm:       Endocrine:         Physical Exam:  Vitals: /70   Pulse 66   Ht 1.702 m (5' 7\")   Wt 73.9 kg (163 lb)   BMI 25.53 kg/m      Constitutional:  cooperative, alert and oriented, well developed, well nourished, in no acute distress        Skin:  warm and dry to the touch          Head:  normocephalic, no masses or lesions        Eyes:  pupils equal and round        Lymph:      ENT:  no pallor or cyanosis, dentition good        Neck:  carotid pulses are full and equal bilaterally        Respiratory:  clear to auscultation, normal symmetry         Cardiac: regular rhythm, no murmurs, gallops or rubs detected                pulses full and equal, no bruits auscultated                                        GI:  abdomen soft, no bruits        Extremities and Muscular Skeletal:  no deformities, clubbing, cyanosis, erythema observed              Neurological:  no gross motor deficits        Psych:  Alert and Oriented x 3        Recent Lab Results:  LIPID RESULTS:  Lab Results   Component Value Date    CHOL 146 05/24/2024    CHOL 152 12/10/2020    HDL 36 (L) 05/24/2024    HDL 41 12/10/2020    LDL 73 05/24/2024    LDL 68 12/10/2020    TRIG 184 (H) 05/24/2024    TRIG 217 (H) 12/10/2020    CHOLHDLRATIO 3.9 10/30/2014       LIVER ENZYME RESULTS:  Lab Results   Component Value Date    AST 31 06/09/2007    ALT 35 05/05/2023    ALT 52 12/10/2020       CBC RESULTS:  Lab Results   Component Value Date    WBC 6.9 05/24/2024    WBC 8.9 06/13/2016    RBC 4.99 05/24/2024    RBC 5.11 06/13/2016    HGB " "14.9 05/24/2024    HGB 15.9 06/13/2016    HCT 45.0 05/24/2024    HCT 45.4 06/13/2016    MCV 90 05/24/2024    MCV 89 06/13/2016    MCH 29.9 05/24/2024    MCH 31.1 06/13/2016    MCHC 33.1 05/24/2024    MCHC 35.0 06/13/2016    RDW 12.1 05/24/2024    RDW 12.5 06/13/2016     05/24/2024     06/13/2016       BMP RESULTS:  Lab Results   Component Value Date     05/24/2024     12/10/2020    POTASSIUM 4.2 05/24/2024    POTASSIUM 4.1 02/23/2022    POTASSIUM 4.4 12/10/2020    CHLORIDE 103 05/24/2024    CHLORIDE 105 02/23/2022    CHLORIDE 104 12/10/2020    CO2 26 05/24/2024    CO2 31 02/23/2022    CO2 31 12/10/2020    ANIONGAP 10 05/24/2024    ANIONGAP 5 02/23/2022    ANIONGAP 5 12/10/2020     (H) 05/24/2024     (H) 02/23/2022     (H) 12/10/2020    BUN 11.5 05/24/2024    BUN 15 02/23/2022    BUN 17 12/10/2020    CR 0.74 05/24/2024    CR 0.74 12/10/2020    GFRESTIMATED >90 05/24/2024    GFRESTIMATED >90 12/10/2020    GFRESTBLACK >90 12/10/2020    PAULA 9.4 05/24/2024    PAULA 9.4 12/10/2020        A1C RESULTS:  No results found for: \"A1C\"    INR RESULTS:  Lab Results   Component Value Date    INR 0.95 05/24/2024    INR 0.90 07/15/2013    INR 0.92 02/22/2010           CC  Tommie King MD  [address] [place] [id number]                  Thank you for allowing me to participate in the care of your patient.      Sincerely,     Saniya Bustillo [facility]  cc:   Tommie King MD  [address] [place] [address]      "

## 2025-06-03 NOTE — PROGRESS NOTES
HPI and Plan:   Luis Migule Barnett is a 69 year old male who presents with history of coronary artery disease with multivessel stenting, most recently underwent PCI with balloon angioplasty to his LAD and diagonal for severe in-stent restenosis.  It was felt that the stents were underexpanded, the interventionalists used a noncompliant balloon with good result to both areas, however stents were not deployed, he only used angioplasty this time.  He did have a stress echocardiogram this may to monitor for recurrent ischemic disease.  He walked for 9 minutes on a Vipin protocol with similar ECG changes to previous studies, but no evidence of regional wall motion abnormality and appropriate augmentation of LV function poststress.  He did not have any symptoms during the stress test.  He feels well, continues to travel frequently.    Exam today is normal    Summary    1.  Coronary artery disease with multivessel stenting, he has had issue with proximal LAD into the first diagonal on several occasions over the last 10 to 15 years.  He underwent angioplasty due to severe in-stent restenosis last May to this area.  I would like him to continue on dual antiplatelet therapy for 2 years.  I will also continue to follow him with an annual stress echocardiogram.  I did renew all of his medications today    I am happy to see him back annually or as needed, please feel free to contact me with any questions given aggressive risk         Today's clinic visit entailed:  Review of the result(s) of each unique test - stress echo  Ordering of each unique test  Prescription drug management    Provider  Link to [facility] Help Grid     The level of medical decision making during this visit was of moderate complexity.    Orders Placed This Encounter   Procedures    Basic metabolic panel    Lipid Profile    ALT    Follow-Up with Cardiology    Exercise Stress Echocardiogram     Orders Placed This Encounter   Medications    ezetimibe-simvastatin  (VYTORIN) 10-40 MG tablet     Sig: Take 1 tablet by mouth at bedtime.     Dispense:  90 tablet     Refill:  4    clopidogrel (PLAVIX) 75 MG tablet     Sig: Take one tablet (75 mg) by mouth daily     Dispense:  90 tablet     Refill:  4    lisinopril (ZESTRIL) 5 MG tablet     Sig: Take 1 tablet (5 mg) by mouth daily.     Dispense:  90 tablet     Refill:  4    metoprolol succinate ER (TOPROL XL) 25 MG 24 hr tablet     Sig: Take 1 tablet (25 mg) by mouth daily. Appointment needed for further refills     Dispense:  90 tablet     Refill:  4     Medications Discontinued During This Encounter   Medication Reason    metoprolol succinate ER (TOPROL XL) 25 MG 24 hr tablet Reorder (No AVS)    lisinopril (ZESTRIL) 5 MG tablet Reorder (No AVS)    clopidogrel (PLAVIX) 75 MG tablet Reorder (No AVS)    ezetimibe-simvastatin (VYTORIN) 10-40 MG tablet Reorder (No AVS)         Encounter Diagnoses   Name Primary?    Coronary artery disease involving native coronary artery of native heart without angina pectoris     Pure hypercholesterolemia     Benign essential hypertension     S/P PTCA (percutaneous transluminal coronary angioplasty)        CURRENT MEDICATIONS:  Current Outpatient Medications   Medication Sig Dispense Refill    clopidogrel (PLAVIX) 75 MG tablet Take one tablet (75 mg) by mouth daily 90 tablet 4    ezetimibe-simvastatin (VYTORIN) 10-40 MG tablet Take 1 tablet by mouth at bedtime. 90 tablet 4    lisinopril (ZESTRIL) 5 MG tablet Take 1 tablet (5 mg) by mouth daily. 90 tablet 4    metoprolol succinate ER (TOPROL XL) 25 MG 24 hr tablet Take 1 tablet (25 mg) by mouth daily. Appointment needed for further refills 90 tablet 4    ascorbic acid (VITAMIN C) 500 MG tablet Take by mouth daily      ASPIRIN 81 MG OR TABS ONE DAILY 100 3    benzonatate (TESSALON) 100 MG capsule Take 1 capsule (100 mg) by mouth 3 times daily as needed for cough (Patient not taking: Reported on 6/3/2025) 30 capsule 0    diazepam (VALIUM) 5 MG tablet  Take 1 tablet (5 mg) by mouth every 6 hours as needed for anxiety (Patient not taking: Reported on 6/3/2025) 4 tablet 0    Multiple Vitamin (MULTIVITAMINS PO) Take 1 tablet by mouth daily      nitroGLYcerin (NITROSTAT) 0.4 MG sublingual tablet Place 1 tablet (0.4 mg) under the tongue every 5 minutes as needed for chest pain 25 tablet 2    Throat Lozenges (COUGH DROPS MT) Take by mouth as needed       UNABLE TO FIND Pure ZZZ         ALLERGIES   No Known Allergies    PAST MEDICAL HISTORY:  Past Medical History:   Diagnosis Date    Antiplatelet or antithrombotic long-term use     Coronary artery disease 2007    multivessel stenting - JULIETA mid LAD x2 and JULIETA 1st diagonal, 2008 complete obstruction 1st diagonal with successful thrombectomy and JULIETA, 2013 in stent restenosis mid LAD (25%), prox to LAD stent had in segment restenosis of 50%, 50-70% in stent restenosis diagonal    Hypertension     Presbyopia     Pure hypercholesterolemia     Stented coronary artery     Unspecified cardiovascular disease 6/07, 11/08       PAST SURGICAL HISTORY:  Past Surgical History:   Procedure Laterality Date    CV CORONARY ANGIOGRAM N/A 5/24/2024    Procedure: Coronary Angiogram;  Surgeon: Reid Davidson MD;  Location: [facility] CARDIAC CATH LAB    CV INTRAVASULAR ULTRASOUND N/A 5/24/2024    Procedure: Intravascular Ultrasound;  Surgeon: Reid Davidson MD;  Location: [facility] CARDIAC CATH LAB    CV PCI ANGIOPLASTY N/A 5/24/2024    Procedure: Percutaneous Transluminal Angioplasty;  Surgeon: Reid Davidson MD;  Location: [facility] CARDIAC CATH LAB    HEART CATH, ANGIOPLASTY      multivessel stenting - JULIETA mid LAD x2 and JULIETA 1st diagonal, 2008 complete obstruction 1st diagonal with successful thrombectomy and JULIETA, 2013 in stent restenosis mid LAD (25%), prox to LAD stent had in segment restenosis of 50%, 50-70% in stent restenosis diagonal    HERNIORRHAPHY INGUINAL  11/16/2012    Procedure: HERNIORRHAPHY INGUINAL;  left inguinal hernia  repair with Mesh ;  Surgeon: Cam Lancaster MD;  Location: [facility] OR    SURGICAL HISTORY OF -       s/p vasectomy     SURGICAL HISTORY OF -   , , 2/10    stent x 2 - LAD/Diagonal - Dr Gastelum - stent  - mid LAD    SURGICAL HISTORY OF -   10/10    stress echo normal       FAMILY HISTORY:  Family History   Problem Relation Age of Onset    Alzheimer Disease Mother     Heart Disease Father         MI    C.A.D. Brother 48        Stents    Family History Negative Other        SOCIAL HISTORY:  Social History     Socioeconomic History    Marital status:      Spouse name: None    Number of children: None    Years of education: None    Highest education level: None   Tobacco Use    Smoking status: Former     Current packs/day: 0.00     Average packs/day: 1 pack/day for 30.2 years (30.2 ttl pk-yrs)     Types: Cigarettes     Start date:      Quit date: 2001     Years since quittin.1    Smokeless tobacco: Never    Tobacco comments:     quit 2001   Substance and Sexual Activity    Alcohol use: No     Alcohol/week: 0.0 standard drinks of alcohol    Drug use: No    Sexual activity: Yes   Other Topics Concern    Parent/sibling w/ CABG, MI or angioplasty before 65F 55M? Yes    Caffeine Concern Yes     Comment: 1 soda a day    Sleep Concern No     Comment: does not sleep well some nights    Stress Concern No    Special Diet No    Exercise Yes     Comment: Walking 2 days per week, working PT    Seat Belt Yes     Social Drivers of Health     Interpersonal Safety: Low Risk  (2024)    Interpersonal Safety     Do you feel physically and emotionally safe where you currently live?: Yes     Within the past 12 months, have you been hit, slapped, kicked or otherwise physically hurt by someone?: No     Within the past 12 months, have you been humiliated or emotionally abused in other ways by your partner or ex-partner?: No       Review of Systems:  Skin:        Eyes:       ENT:       Respiratory:   "Negative    Cardiovascular:  Negative    Gastroenterology:      Genitourinary:       Musculoskeletal:       Neurologic:       Psychiatric:       Heme/Lymph/Imm:       Endocrine:         Physical Exam:  Vitals: /70   Pulse 66   Ht 1.702 m (5' 7\")   Wt 73.9 kg (163 lb)   BMI 25.53 kg/m      Constitutional:  cooperative, alert and oriented, well developed, well nourished, in no acute distress        Skin:  warm and dry to the touch          Head:  normocephalic, no masses or lesions        Eyes:  pupils equal and round        Lymph:      ENT:  no pallor or cyanosis, dentition good        Neck:  carotid pulses are full and equal bilaterally        Respiratory:  clear to auscultation, normal symmetry         Cardiac: regular rhythm, no murmurs, gallops or rubs detected                pulses full and equal, no bruits auscultated                                        GI:  abdomen soft, no bruits        Extremities and Muscular Skeletal:  no deformities, clubbing, cyanosis, erythema observed              Neurological:  no gross motor deficits        Psych:  Alert and Oriented x 3        Recent Lab Results:  LIPID RESULTS:  Lab Results   Component Value Date    CHOL 146 05/24/2024    CHOL 152 12/10/2020    HDL 36 (L) 05/24/2024    HDL 41 12/10/2020    LDL 73 05/24/2024    LDL 68 12/10/2020    TRIG 184 (H) 05/24/2024    TRIG 217 (H) 12/10/2020    CHOLHDLRATIO 3.9 10/30/2014       LIVER ENZYME RESULTS:  Lab Results   Component Value Date    AST 31 06/09/2007    ALT 35 05/05/2023    ALT 52 12/10/2020       CBC RESULTS:  Lab Results   Component Value Date    WBC 6.9 05/24/2024    WBC 8.9 06/13/2016    RBC 4.99 05/24/2024    RBC 5.11 06/13/2016    HGB 14.9 05/24/2024    HGB 15.9 06/13/2016    HCT 45.0 05/24/2024    HCT 45.4 06/13/2016    MCV 90 05/24/2024    MCV 89 06/13/2016    MCH 29.9 05/24/2024    MCH 31.1 06/13/2016    MCHC 33.1 05/24/2024    MCHC 35.0 06/13/2016    RDW 12.1 05/24/2024    RDW 12.5 06/13/2016    " " 05/24/2024     06/13/2016       BMP RESULTS:  Lab Results   Component Value Date     05/24/2024     12/10/2020    POTASSIUM 4.2 05/24/2024    POTASSIUM 4.1 02/23/2022    POTASSIUM 4.4 12/10/2020    CHLORIDE 103 05/24/2024    CHLORIDE 105 02/23/2022    CHLORIDE 104 12/10/2020    CO2 26 05/24/2024    CO2 31 02/23/2022    CO2 31 12/10/2020    ANIONGAP 10 05/24/2024    ANIONGAP 5 02/23/2022    ANIONGAP 5 12/10/2020     (H) 05/24/2024     (H) 02/23/2022     (H) 12/10/2020    BUN 11.5 05/24/2024    BUN 15 02/23/2022    BUN 17 12/10/2020    CR 0.74 05/24/2024    CR 0.74 12/10/2020    GFRESTIMATED >90 05/24/2024    GFRESTIMATED >90 12/10/2020    GFRESTBLACK >90 12/10/2020    PAULA 9.4 05/24/2024    PAULA 9.4 12/10/2020        A1C RESULTS:  No results found for: \"A1C\"    INR RESULTS:  Lab Results   Component Value Date    INR 0.95 05/24/2024    INR 0.90 07/15/2013    INR 0.92 02/22/2010           CC  Tommie King MD  [address] [place] [address]                "

## 2025-09-02 ENCOUNTER — TELEPHONE (OUTPATIENT)
Dept: CARDIOLOGY | Facility: CLINIC | Age: 70
End: 2025-09-02
Payer: MEDICARE

## (undated) DEVICE — GUIDEWIRE RUNTHROUGH IZANAI PTCA .014 X 180CM 25-5211

## (undated) DEVICE — CATH GUIDING BLUE YELLOW PTFE XB3.5 6FRX100CM 67005400

## (undated) DEVICE — CATH BALLOON NC EMERGE 3.00X12MM H7493926712300

## (undated) DEVICE — CATH ANGIO JUDKINS R4 6FRX100CM INFINITI 534621T

## (undated) DEVICE — CATH DIAGNOSTIC RADIAL 5FR TIG 4.0

## (undated) DEVICE — INTRO GLIDESHEATH SLENDER 6FR 10X45CM 60-1060

## (undated) DEVICE — MANIFOLD KIT ANGIO AUTOMATED 014613

## (undated) DEVICE — CATH GUIDING BLUE YELLOW PTFE XB3 6FRX100CM 67005200

## (undated) DEVICE — CATH GUIDELINER 6FR 5571

## (undated) DEVICE — KIT HAND CONTROL ANGIOTOUCH ACIST 65CM AT-P65

## (undated) DEVICE — WIRE GUIDE 0.035"X260CM SAFE-T-J EXCHANGE G00517

## (undated) DEVICE — CATH BALLOON NC EMERGE 3.00X20MM H7493926720300

## (undated) DEVICE — TOTE ANGIO CORP PC15AT SAN32CC83O

## (undated) DEVICE — SLEEVE TR BAND RADIAL COMPRESSION DEVICE 24CM TRB24-REG

## (undated) DEVICE — VALVE HEMOSTASIS .096" COPILOT MECH 1003331

## (undated) DEVICE — CATH US OD 5FR OD SEC 2.9FR EAGLE EYE PLATINUM 0.014 85900P

## (undated) DEVICE — DEFIB PRO-PADZ LVP LQD GEL ADULT 8900-2105-01

## (undated) DEVICE — INFL DVC BASIXCOMPAK PLYCRB 30 ATM 13IN 20ML IN4530

## (undated) DEVICE — GUIDEWIRE VASC 0.014INX180CM RUNTHROUGH 25-1011

## (undated) RX ORDER — NITROGLYCERIN 5 MG/ML
VIAL (ML) INTRAVENOUS
Status: DISPENSED
Start: 2024-05-24

## (undated) RX ORDER — FENTANYL CITRATE 50 UG/ML
INJECTION, SOLUTION INTRAMUSCULAR; INTRAVENOUS
Status: DISPENSED
Start: 2024-05-24

## (undated) RX ORDER — HEPARIN SODIUM 1000 [USP'U]/ML
INJECTION, SOLUTION INTRAVENOUS; SUBCUTANEOUS
Status: DISPENSED
Start: 2024-05-24

## (undated) RX ORDER — VERAPAMIL HYDROCHLORIDE 2.5 MG/ML
INJECTION, SOLUTION INTRAVENOUS
Status: DISPENSED
Start: 2024-05-24

## (undated) RX ORDER — LIDOCAINE HYDROCHLORIDE 10 MG/ML
INJECTION, SOLUTION EPIDURAL; INFILTRATION; INTRACAUDAL; PERINEURAL
Status: DISPENSED
Start: 2024-05-24

## (undated) RX ORDER — CLOPIDOGREL 300 MG/1
TABLET, FILM COATED ORAL
Status: DISPENSED
Start: 2024-05-24

## (undated) RX ORDER — HEPARIN SODIUM 200 [USP'U]/100ML
INJECTION, SOLUTION INTRAVENOUS
Status: DISPENSED
Start: 2024-05-24

## (undated) RX ORDER — ASPIRIN 81 MG/1
TABLET, CHEWABLE ORAL
Status: DISPENSED
Start: 2024-05-24